# Patient Record
Sex: FEMALE | Race: WHITE | NOT HISPANIC OR LATINO | Employment: FULL TIME | URBAN - METROPOLITAN AREA
[De-identification: names, ages, dates, MRNs, and addresses within clinical notes are randomized per-mention and may not be internally consistent; named-entity substitution may affect disease eponyms.]

---

## 2021-04-08 DIAGNOSIS — Z23 ENCOUNTER FOR IMMUNIZATION: ICD-10-CM

## 2022-02-02 ENCOUNTER — APPOINTMENT (OUTPATIENT)
Dept: RADIOLOGY | Facility: CLINIC | Age: 67
End: 2022-02-02
Payer: COMMERCIAL

## 2022-02-02 ENCOUNTER — OFFICE VISIT (OUTPATIENT)
Dept: OBGYN CLINIC | Facility: CLINIC | Age: 67
End: 2022-02-02
Payer: COMMERCIAL

## 2022-02-02 VITALS
HEART RATE: 76 BPM | WEIGHT: 110 LBS | BODY MASS INDEX: 18.78 KG/M2 | DIASTOLIC BLOOD PRESSURE: 72 MMHG | HEIGHT: 64 IN | SYSTOLIC BLOOD PRESSURE: 126 MMHG

## 2022-02-02 DIAGNOSIS — M25.562 ACUTE PAIN OF LEFT KNEE: ICD-10-CM

## 2022-02-02 DIAGNOSIS — Z01.89 ENCOUNTER FOR LOWER EXTREMITY COMPARISON IMAGING STUDY: ICD-10-CM

## 2022-02-02 DIAGNOSIS — S80.02XA CONTUSION OF LEFT KNEE, INITIAL ENCOUNTER: Primary | ICD-10-CM

## 2022-02-02 PROCEDURE — 73562 X-RAY EXAM OF KNEE 3: CPT

## 2022-02-02 PROCEDURE — 99203 OFFICE O/P NEW LOW 30 MIN: CPT | Performed by: ORTHOPAEDIC SURGERY

## 2022-02-02 PROCEDURE — 73560 X-RAY EXAM OF KNEE 1 OR 2: CPT

## 2022-02-02 RX ORDER — METFORMIN HYDROCHLORIDE 500 MG/1
TABLET, EXTENDED RELEASE ORAL
COMMUNITY
Start: 2022-01-25

## 2022-02-02 RX ORDER — DULAGLUTIDE 1.5 MG/.5ML
INJECTION, SOLUTION SUBCUTANEOUS
COMMUNITY
Start: 2022-01-03

## 2022-02-02 RX ORDER — ATORVASTATIN CALCIUM 20 MG/1
TABLET, FILM COATED ORAL
COMMUNITY
Start: 2022-01-13

## 2022-02-02 RX ORDER — LEVOTHYROXINE SODIUM 100 UG/1
100 TABLET ORAL EVERY MORNING
COMMUNITY
Start: 2021-12-13

## 2022-02-02 RX ORDER — EMPAGLIFLOZIN 25 MG/1
25 TABLET, FILM COATED ORAL DAILY
COMMUNITY
Start: 2022-01-08

## 2022-02-02 NOTE — PROGRESS NOTES
Assessment/Plan:  1  Contusion of left knee, initial encounter     2  Acute pain of left knee  XR knee 3 vw left non injury     Scribe Attestation    I,:  Nano Cage am acting as a scribe while in the presence of the attending physician :       I,:  Peter Mireles, DO personally performed the services described in this documentation    as scribed in my presence :         Awilda Willard is a pleasant 74year old who presents to the office today for an initial evaluation of her left knee  Upon review of the left knee x-ray's, a thorough history and my examination, Awilda Willard is presenting with signs and symptoms consistent with a left knee contusion with age-appropriate degenerative changes  I discussed with the patient that there is no should structural damage but she could of exacerbated the underlying osteoarthritis in her left knee  I discussed with the patient that she may perform her activities as tolerated  She may use Tylenol as needed to help alleviate her pain  I discussed with the patient that if she sees no improvement with her pain in 1 month she should call the office to schedule a follow-up appointment  I discussed with the patient that time, I would likely order formal physical therapy  I discussed with the patient that if her symptoms resolve, I will will follow up with her as needed  She understood and had no further questions  Subjective: Initial evaluation of left knee    Patient ID: Dulce Palacios is a 74 y o  female  who presents to the office today for an initial evaluation of her left knee  She states that on 01/13/2022 she tripped up concrete steps and struck the anterior aspect of her left knee  She states that her pain has been improving since the initial injury  She denies any pain or limitations with ambulating up and down stairs and walking  She states that she will experience pain with kneeling  She states that she will use ice and Tylenol to help alleviate her pain    She denies any previous injuries  She denies any distal paresthesias  Review of Systems   Constitutional: Positive for activity change  Negative for chills, fever and unexpected weight change  HENT: Negative for hearing loss, nosebleeds and sore throat  Eyes: Negative for pain, redness and visual disturbance  Respiratory: Negative for cough, shortness of breath and wheezing  Cardiovascular: Negative for chest pain, palpitations and leg swelling  Gastrointestinal: Negative for abdominal pain, nausea and vomiting  Endocrine: Negative for polyphagia and polyuria  Genitourinary: Negative for dysuria and hematuria  Musculoskeletal: Negative for arthralgias, gait problem, joint swelling and myalgias  Skin: Negative for rash and wound  Neurological: Negative for dizziness, numbness and headaches  Psychiatric/Behavioral: Negative for confusion and suicidal ideas  The patient is not nervous/anxious  Past Medical History:   Diagnosis Date    Diabetes mellitus ([facility] [address] )     Disease of thyroid gland        Past Surgical History:   Procedure Laterality Date     SECTION         Family History   Problem Relation Age of Onset    Cancer Father     Osteoporosis Mother        Social History     Occupational History    Not on file   Tobacco Use    Smoking status: Former Smoker     Packs/day: 0 25     Years: 5 00     Pack years: 1 25     Start date: 1970     Quit date: 1974     Years since quittin 7    Smokeless tobacco: Never Used   Vaping Use    Vaping Use: Never used   Substance and Sexual Activity    Alcohol use:  Yes     Alcohol/week: 2 0 standard drinks     Types: 2 Glasses of wine per week    Drug use: Never    Sexual activity: Not Currently     Partners: Male         Current Outpatient Medications:     atorvastatin (LIPITOR) 20 mg tablet, TAKE 1 TABLET BY MOUTH EVERY DAY AT NIGHT, Disp: , Rfl:     Jardiance 25 MG TABS, Take 25 mg by mouth daily, Disp: , Rfl:     Levoxyl 100 MCG tablet, Take 100 mcg by mouth every morning, Disp: , Rfl:     metFORMIN (GLUCOPHAGE-XR) 500 mg 24 hr tablet, TAKE TWO TABLETS BY MOUTH TWICE A DAY WITH FOOD, Disp: , Rfl:     Trulicity 1 5 JY/9 8ID SOPN, INJECT CONTENTS OF 1 PEN UNDER THE SKIN ONCE A WEEK, Disp: , Rfl:     Allergies   Allergen Reactions    Bacitracin-Neomycin-Polymyxin Rash    Neosporin Plus Max St Itching       Objective:  Vitals:    02/02/22 0755   BP: 126/72   Pulse: 76       Body mass index is 18 88 kg/m²  Left Knee Exam     Tenderness   Left knee tenderness location: Superior patella  Range of Motion   Extension: 0   Flexion: 130     Tests   Varus: negative Valgus: negative  Drawer:  Anterior - negative     Posterior - negative    Other   Erythema: absent  Scars: absent  Sensation: normal  Pulse: present  Swelling: none  Effusion: no effusion present    Comments:  Healing abrasion          Observations   Left Knee   Negative for effusion  Physical Exam  Vitals reviewed  Constitutional:       Appearance: Normal appearance  She is well-developed  HENT:      Head: Normocephalic and atraumatic  Eyes:      General:         Right eye: No discharge  Left eye: No discharge  Extraocular Movements: Extraocular movements intact  Conjunctiva/sclera: Conjunctivae normal    Cardiovascular:      Rate and Rhythm: Normal rate  Pulmonary:      Effort: Pulmonary effort is normal  No respiratory distress  Musculoskeletal:      Cervical back: Normal range of motion and neck supple  Left knee: No effusion  Skin:     General: Skin is warm and dry  Neurological:      General: No focal deficit present  Mental Status: She is alert and oriented to person, place, and time  Psychiatric:         Mood and Affect: Mood normal          Behavior: Behavior normal          I have personally reviewed pertinent films in PACS    X-rays performed in the office today of her left knee demonstrates age-appropriate degenerative changes  There are no acute fractures, dislocations, lytic or blastic lesions

## 2023-06-08 ENCOUNTER — OFFICE VISIT (OUTPATIENT)
Dept: URGENT CARE | Facility: CLINIC | Age: 68
End: 2023-06-08
Payer: COMMERCIAL

## 2023-06-08 VITALS
TEMPERATURE: 97.6 F | RESPIRATION RATE: 16 BRPM | DIASTOLIC BLOOD PRESSURE: 59 MMHG | SYSTOLIC BLOOD PRESSURE: 132 MMHG | HEART RATE: 79 BPM

## 2023-06-08 DIAGNOSIS — R21 RASH AND NONSPECIFIC SKIN ERUPTION: Primary | ICD-10-CM

## 2023-06-08 PROCEDURE — G0383 LEV 4 HOSP TYPE B ED VISIT: HCPCS | Performed by: PHYSICIAN ASSISTANT

## 2023-06-08 RX ORDER — SULFAMETHOXAZOLE AND TRIMETHOPRIM 800; 160 MG/1; MG/1
1 TABLET ORAL EVERY 12 HOURS SCHEDULED
Qty: 10 TABLET | Refills: 0 | Status: SHIPPED | OUTPATIENT
Start: 2023-06-08 | End: 2023-06-13

## 2023-06-08 NOTE — PROGRESS NOTES
[facility]        NAME: Shane Gasca is a 69 y o  female  : 1955    MRN: [id number]  DATE: 2023  TIME: 11:41 AM    Assessment and Plan   Rash and nonspecific skin eruption [R21]  1  Rash and nonspecific skin eruption  sulfamethoxazole-trimethoprim (BACTRIM DS) 800-160 mg per tablet      Pt presents with rash  Distribution and history are not consistent with bed bugs, but bed bug precautions were discussed  She is diabetic and will be started on Bactrim DS as MRSA infection is also part of the differential diagnosis for her  Patient Instructions   Patient Instructions   Continue steroid cream as prescribed  Take Bactrim DS as prescribed  If symptoms are not improved in 3-5 days, follow-up with PCP  If symptoms worsen or you develop any new symptoms, report to the emergency department immediately  Follow up with PCP in 3-5 days  Proceed to  ER if symptoms worsen  Chief Complaint     Chief Complaint   Patient presents with   • Insect Bite     Has bug bites on back of neck  Noticed 4 days ago  Had televisit the next day  Gave clobetasol  Not improving  History of Present Illness       69year old female presents with concerns for bug bites to the back of her neck  She reports she was out of town and stayed with her son and finance in a rented [place] [address]  She noticed the bites as they were coming home  and had a telehealth visit on Monday and was started on Clobetasol which has improved the itch but not the appearance of the bites  She is concerned whether or not they might be bed bug bites  She states her son and fiancee were in a different room, but in the same house and have no bites  Review of Systems   Review of Systems   Skin: Positive for rash           Current Medications       Current Outpatient Medications:   •  atorvastatin (LIPITOR) 20 mg tablet, TAKE 1 TABLET BY MOUTH EVERY DAY AT NIGHT, Disp: , Rfl:   •  Jardiance 25 MG TABS, Take 25 mg by mouth daily, Disp: , Rfl:   •  Levoxyl 100 MCG tablet, Take 100 mcg by mouth every morning, Disp: , Rfl:   •  metFORMIN (GLUCOPHAGE-XR) 500 mg 24 hr tablet, TAKE TWO TABLETS BY MOUTH TWICE A DAY WITH FOOD, Disp: , Rfl:   •  sulfamethoxazole-trimethoprim (BACTRIM DS) 800-160 mg per tablet, Take 1 tablet by mouth every 12 (twelve) hours for 5 days, Disp: 10 tablet, Rfl: 0  •  Trulicity 1 5 VF/4 6NN SOPN, INJECT CONTENTS OF 1 PEN UNDER THE SKIN ONCE A WEEK, Disp: , Rfl:     Current Allergies     Allergies as of 2023 - Reviewed 2023   Allergen Reaction Noted   • Bacitracin-neomycin-polymyxin Rash 2019   • Anatoliy-bacit-poly-lidocaine Itching 2022            The following portions of the patient's history were reviewed and updated as appropriate: allergies, current medications, past family history, past medical history, past social history, past surgical history and problem list      Past Medical History:   Diagnosis Date   • Diabetes mellitus ([facility] [address] )    • Disease of thyroid gland        Past Surgical History:   Procedure Laterality Date   •  SECTION         Family History   Problem Relation Age of Onset   • Cancer Father    • Osteoporosis Mother          Medications have been verified  Objective   /59   Pulse 79   Temp 97 6 °F (36 4 °C) (Temporal)   Resp 16   No LMP recorded  Physical Exam     Physical Exam  Vitals and nursing note reviewed  Constitutional:       General: She is awake  She is not in acute distress  Appearance: Normal appearance  She is well-developed and well-groomed  She is not ill-appearing, toxic-appearing or diaphoretic  HENT:      Head: Normocephalic and atraumatic  Right Ear: Hearing and external ear normal       Left Ear: Hearing and external ear normal    Eyes:      General: Lids are normal  Vision grossly intact  Gaze aligned appropriately     Neck:      Comments: Pt has 5 0 3 cm erythematous lesions with central blister to the "back of the neck with 4 in a linear pattern, there are additional lesions behind both ears and on the left shoulder  Pattern of \"bites\" is not consistent with bed bugs  Cardiovascular:      Rate and Rhythm: Normal rate  Pulmonary:      Effort: Pulmonary effort is normal       Comments: Patient is speaking in full sentences with no increased respiratory effort  No audible wheezing or stridor  Musculoskeletal:      Cervical back: Normal range of motion  Skin:     General: Skin is warm and dry  Neurological:      Mental Status: She is alert and oriented to person, place, and time  Coordination: Coordination is intact  Gait: Gait is intact  Psychiatric:         Attention and Perception: Attention and perception normal          Mood and Affect: Mood and affect normal          Speech: Speech normal          Behavior: Behavior normal  Behavior is cooperative  Note: Portions of this record may have been created with voice recognition software  Occasional wrong word or \"sound a like\" substitutions may have occurred due to the inherent limitations of voice recognition software  Please read the chart carefully and recognize, using context, where substitutions have occurred  *      "

## 2023-06-08 NOTE — PATIENT INSTRUCTIONS
Continue steroid cream as prescribed  Take Bactrim DS as prescribed  If symptoms are not improved in 3-5 days, follow-up with PCP  If symptoms worsen or you develop any new symptoms, report to the emergency department immediately

## 2023-07-17 LAB
CREAT ?TM UR-SCNC: 54 UMOL/L
EXT ALBUMIN URINE RANDOM: 0.2
EXTERNAL EGFR: 97
MICROALBUMIN/CREAT UR: 4 MG/G{CREAT}

## 2023-12-11 LAB
LEFT EYE DIABETIC RETINOPATHY: NORMAL
RIGHT EYE DIABETIC RETINOPATHY: NORMAL
SEVERITY (EYE EXAM): NORMAL

## 2024-01-08 NOTE — PROGRESS NOTES
PT Evaluation     Today's date: 2024  Patient name: Lydia Patel  : 1955  MRN: [id number]  Referring provider: Alyson Maria  Dx:   Encounter Diagnosis     ICD-10-CM    1. Chronic midline low back pain, unspecified whether sciatica present  M54.50     G89.29           Start Time: 1452  Stop Time: 1529  Total time in clinic (min): 37 minutes    Assessment  Assessment details: Lydia Patel is a 68 y.o. female who presents with signs and symptoms consistent of chronic LBP. Patient presents with pain, decreased strength, decreased joint mobility, and postural dysfunction. Due to these impairments, Patient has difficulty performing prolonged sitting, squatting, lifting, and reaching. Patient would benefit from skilled physical therapy to address the impairments, improve their level of function, and to improve their overall quality of life. Reviewed HEP, involved anatomy, physio as well as POC with verbalized understanding and pt is in agreement with above. Pt responded very well on IE with hip and L/S muscle activation acting as a stabilizer to the lumbar spine when moving.  Impairments: abnormal gait, abnormal or restricted ROM, impaired balance, impaired physical strength, lacks appropriate home exercise program, pain with function and poor body mechanics    Goals  Short Term Goals: to be achieved by 4 weeks  1) Patient to be independent with basic HEP  2) Decrease pain to 3/10 at its worst  3) Increase lumbar spine AROM to WFL, pain free  4) Increase LE strength by 1/2 MMT grade in all deficient planes    Long Term Goals: to be achieved by discharge  1) FOTO equal to or greater than projected goal.  2) Patient to be independent with comprehensive HEP  3) Lumbar spine joint mobs WFL  4) Increase LE strength to 5/5 MMT grade in all planes to improve a/iadls  5) Patient to report no sleep interruption secondary to pain  6) Increase tolerance for seated activities to >60 min.     Plan  Patient would  benefit from: skilled physical therapy  Planned modality interventions: cryotherapy and thermotherapy: hydrocollator packs  Planned therapy interventions: abdominal trunk stabilization, ADL training, balance, body mechanics training, home exercise program, functional ROM exercises, flexibility, therapeutic exercise, therapeutic activities, stretching, strengthening, joint mobilization, manual therapy, neuromuscular re-education, patient education, postural training, IASTM, kinesiology taping, massage and Eisenberg taping  Frequency: 2x week  Duration in visits: 16  Duration in weeks: 8  Treatment plan discussed with: patient      Subjective Evaluation    History of Present Illness  Mechanism of injury: History: Pt presents to PT with primary c/o LBP. Pt went to lift 2 heavy planters to take outside to the porch. Pt reports she went to lift it and felt a significant amount of pain. Pt spent the rest of the day going between heat, ice and pain meds. Pt then did some yoga stretches as that is her usual go to. Pt also tried lidocaine patches that didn't help. Pt saw her PCP and told to keep going with stretches. Feels everything has healed with the exception of one spot. When she gets up in the AM, she sits on heat and does her stretches and is better the rest of the day. Pt reports she gets sig hip pain and L sided back pain when first getting up in the morning. 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    Aggravating factors: getting OOB  Relieving factors: see above  Imaging: x-ray  Status: retired  Hobbies/recreation: [employer], walking  Patient Goals  Patient goals for therapy: decreased pain and return to sport/leisure activities    Pain  Current pain ratin  At best pain ratin  At worst pain ratin  Quality: dull ache and sharp  Relieving factors: change in position, heat, ice and medications  Aggravating factors: standing    Social Support  Steps to enter house:  yes  Stairs in house: yes     Employment status: not working    Diagnostic Tests  X-ray: normal  Treatments  Previous treatment: home therapy  Current treatment: physical therapy        Objective     Static Posture     Lumbar Spine   Decreased lordosis.     Palpation   Left   Tenderness of the lumbar paraspinals.     Right   Tenderness of the lumbar paraspinals.     Active Range of Motion     Lumbar   Normal active range of motion  Left Hip   Normal active range of motion    Right Hip   Normal active range of motion    Joint Play   Joints within functional limits: T11, T12, L1, L2 and L3     Hypomobile: L4, L5 and S1   Mechanical Assessment    Cervical      Thoracic      Lumbar    Lying extension: sustained positions  Pain location: centralized  Pain intensity: better  Pain level: decreased    Strength/Myotome Testing     Left Hip   Planes of Motion   Flexion: 4  Extension: 3+  Abduction: 3+    Right Hip   Planes of Motion   Flexion: 4  Extension: 3+  Abduction: 3+    Tests     Lumbar     Left   Negative crossed SLR and slump test.     Right   Negative crossed SLR and slump test.     General Comments:      Lumbar Comments  Pain upon returning to standing from flexion- no pain with OP in all motions  No referral with lumbar PA mobs    MMT without holding on: pain  MMT with holding onto table: no pain reported  *stabilized core reducing pain with MMT       Precautions: DM      Manuals 1/9            L/S PA mobs CPA GII/GIII                                                   Neuro Re-Ed             Anti-rotation             AB             AB c ABD             Steamboats B Abd & ext 15x                                                   Ther Ex             HS S             SL LAQ             SL HS Curl             NuStep                                                                 Ther Activity                                       Gait Training                                       Modalities

## 2024-01-09 ENCOUNTER — EVALUATION (OUTPATIENT)
Dept: PHYSICAL THERAPY | Facility: CLINIC | Age: 69
End: 2024-01-09
Payer: COMMERCIAL

## 2024-01-09 DIAGNOSIS — M54.50 CHRONIC MIDLINE LOW BACK PAIN, UNSPECIFIED WHETHER SCIATICA PRESENT: Primary | ICD-10-CM

## 2024-01-09 DIAGNOSIS — G89.29 CHRONIC MIDLINE LOW BACK PAIN, UNSPECIFIED WHETHER SCIATICA PRESENT: Primary | ICD-10-CM

## 2024-01-09 PROCEDURE — 97140 MANUAL THERAPY 1/> REGIONS: CPT

## 2024-01-09 PROCEDURE — 97161 PT EVAL LOW COMPLEX 20 MIN: CPT

## 2024-01-09 NOTE — LETTER
2024    Alyson Maria  [address] [address]    Patient: Lydia Patel   YOB: 1955   Date of Visit: 2024     Encounter Diagnosis     ICD-10-CM    1. Chronic midline low back pain, unspecified whether sciatica present  M54.50     G89.29           Dear Dr. Maria:    Thank you for your recent referral of Lydia Patel. Please review the attached evaluation summary from Lydia's recent visit.     Please verify that you agree with the plan of care by signing the attached order.     If you have any questions or concerns, please do not hesitate to call.     I sincerely appreciate the opportunity to share in the care of one of your patients and hope to have another opportunity to work with you in the near future.       Sincerely,    Martha Naylor, PT      Referring Provider:      I certify that I have read the below Plan of Care and certify the need for these services furnished under this plan of treatment while under my care.                    Alyson Maria  [address] [place] [id number]  Via Fax: [phone]          PT Evaluation     Today's date: 2024  Patient name: Lydia Patel  : 1955  MRN: [id number]  Referring provider: Alyson Maria  Dx:   Encounter Diagnosis     ICD-10-CM    1. Chronic midline low back pain, unspecified whether sciatica present  M54.50     G89.29           Start Time: 1452  Stop Time: 1529  Total time in clinic (min): 37 minutes    Assessment  Assessment details: Lydia Patel is a 68 y.o. female who presents with signs and symptoms consistent of chronic LBP. Patient presents with pain, decreased strength, decreased joint mobility, and postural dysfunction. Due to these impairments, Patient has difficulty performing prolonged sitting, squatting, lifting, and reaching. Patient would benefit from skilled physical therapy to address the impairments, improve their level of function, and to  improve their overall quality of life. Reviewed HEP, involved anatomy, physio as well as POC with verbalized understanding and pt is in agreement with above. Pt responded very well on IE with hip and L/S muscle activation acting as a stabilizer to the lumbar spine when moving.  Impairments: abnormal gait, abnormal or restricted ROM, impaired balance, impaired physical strength, lacks appropriate home exercise program, pain with function and poor body mechanics    Goals  Short Term Goals: to be achieved by 4 weeks  1) Patient to be independent with basic HEP  2) Decrease pain to 3/10 at its worst  3) Increase lumbar spine AROM to WFL, pain free  4) Increase LE strength by 1/2 MMT grade in all deficient planes    Long Term Goals: to be achieved by discharge  1) FOTO equal to or greater than projected goal.  2) Patient to be independent with comprehensive HEP  3) Lumbar spine joint mobs WFL  4) Increase LE strength to 5/5 MMT grade in all planes to improve a/iadls  5) Patient to report no sleep interruption secondary to pain  6) Increase tolerance for seated activities to >60 min.     Plan  Patient would benefit from: skilled physical therapy  Planned modality interventions: cryotherapy and thermotherapy: hydrocollator packs  Planned therapy interventions: abdominal trunk stabilization, ADL training, balance, body mechanics training, home exercise program, functional ROM exercises, flexibility, therapeutic exercise, therapeutic activities, stretching, strengthening, joint mobilization, manual therapy, neuromuscular re-education, patient education, postural training, IASTM, kinesiology taping, massage and Eisenberg taping  Frequency: 2x week  Duration in visits: 16  Duration in weeks: 8  Treatment plan discussed with: patient      Subjective Evaluation    History of Present Illness  Mechanism of injury: History: Pt presents to PT with primary c/o LBP. Pt went to lift 2 heavy planters to take outside to the porch. Pt  reports she went to lift it and felt a significant amount of pain. Pt spent the rest of the day going between heat, ice and pain meds. Pt then did some yoga stretches as that is her usual go to. Pt also tried lidocaine patches that didn't help. Pt saw her PCP and told to keep going with stretches. Feels everything has healed with the exception of one spot. When she gets up in the AM, she sits on heat and does her stretches and is better the rest of the day. Pt reports she gets sig hip pain and L sided back pain when first getting up in the morning. 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    Aggravating factors: getting OOB  Relieving factors: see above  Imaging: x-ray  Status: retired  Hobbies/recreation: [employer], walking  Patient Goals  Patient goals for therapy: decreased pain and return to sport/leisure activities    Pain  Current pain ratin  At best pain ratin  At worst pain ratin  Quality: dull ache and sharp  Relieving factors: change in position, heat, ice and medications  Aggravating factors: standing    Social Support  Steps to enter house: yes  Stairs in house: yes     Employment status: not working    Diagnostic Tests  X-ray: normal  Treatments  Previous treatment: home therapy  Current treatment: physical therapy        Objective     Static Posture     Lumbar Spine   Decreased lordosis.     Palpation   Left   Tenderness of the lumbar paraspinals.     Right   Tenderness of the lumbar paraspinals.     Active Range of Motion     Lumbar   Normal active range of motion  Left Hip   Normal active range of motion    Right Hip   Normal active range of motion    Joint Play   Joints within functional limits: T11, T12, L1, L2 and L3     Hypomobile: L4, L5 and S1   Mechanical Assessment    Cervical      Thoracic      Lumbar    Lying extension: sustained positions  Pain location: centralized  Pain intensity: better  Pain level: decreased    Strength/Myotome  Testing     Left Hip   Planes of Motion   Flexion: 4  Extension: 3+  Abduction: 3+    Right Hip   Planes of Motion   Flexion: 4  Extension: 3+  Abduction: 3+    Tests     Lumbar     Left   Negative crossed SLR and slump test.     Right   Negative crossed SLR and slump test.     General Comments:      Lumbar Comments  Pain upon returning to standing from flexion- no pain with OP in all motions  No referral with lumbar PA mobs    MMT without holding on: pain  MMT with holding onto table: no pain reported  *stabilized core reducing pain with MMT       Precautions: DM      Manuals 1/9            L/S PA mobs CPA GII/GIII                                                   Neuro Re-Ed             Anti-rotation             AB             AB c ABD             Steamboats B Abd & ext 15x                                                   Ther Ex             HS S             SL LAQ             SL HS Curl             NuStep                                                                 Ther Activity                                       Gait Training                                       Modalities

## 2024-01-10 ENCOUNTER — OFFICE VISIT (OUTPATIENT)
Dept: PHYSICAL THERAPY | Facility: CLINIC | Age: 69
End: 2024-01-10
Payer: COMMERCIAL

## 2024-01-10 DIAGNOSIS — G89.29 CHRONIC MIDLINE LOW BACK PAIN, UNSPECIFIED WHETHER SCIATICA PRESENT: Primary | ICD-10-CM

## 2024-01-10 DIAGNOSIS — M54.50 CHRONIC MIDLINE LOW BACK PAIN, UNSPECIFIED WHETHER SCIATICA PRESENT: Primary | ICD-10-CM

## 2024-01-10 PROCEDURE — 97140 MANUAL THERAPY 1/> REGIONS: CPT

## 2024-01-10 PROCEDURE — 97110 THERAPEUTIC EXERCISES: CPT

## 2024-01-10 PROCEDURE — 97112 NEUROMUSCULAR REEDUCATION: CPT

## 2024-01-10 NOTE — PROGRESS NOTES
"Daily Note     Today's date: 1/10/2024  Patient name: Lydia Patel  : 1955  MRN: [id number]  Referring provider: Alyson Maria  Dx:   Encounter Diagnosis     ICD-10-CM    1. Chronic midline low back pain, unspecified whether sciatica present  M54.50     G89.29           Start Time: 928  Stop Time: 1016  Total time in clinic (min): 48 minutes    Subjective: Pt reports this AM she had some more discomfort in the one certain spot in her back and that it has been higher in the last few days.       Objective: See treatment diary below      Assessment: Tolerated treatment well. Patient demonstrated fatigue post treatment, exhibited good technique with therapeutic exercises, and would benefit from continued PT. Added open books for HEP and pt reported an overall decrease in LBP at conclusion of the session. Pt did have more hip discomfort upon arrival that dissipated by conclusion of session.      Plan: Continue per plan of care.  Progress treatment as tolerated.       Precautions: DM      Manuals 1/9 1/10           L/S PA mobs CPA GII/GIII CPA GII/GIII                                                  Neuro Re-Ed             Anti-rotation             AB  5\"x10           AB c ADD  5\"x10           Steamboats B Abd & ext 15x                                                   Ther Ex             HS S              LAQ  11# 2x10            HS Curl  11# 2x10           NuStep  L3 7'           bridges  3\"x10           Open books  5\"x10 B                                     Ther Activity                                       Gait Training                                       Modalities                                            "

## 2024-01-15 ENCOUNTER — OFFICE VISIT (OUTPATIENT)
Dept: PHYSICAL THERAPY | Facility: CLINIC | Age: 69
End: 2024-01-15
Payer: COMMERCIAL

## 2024-01-15 DIAGNOSIS — G89.29 CHRONIC MIDLINE LOW BACK PAIN, UNSPECIFIED WHETHER SCIATICA PRESENT: Primary | ICD-10-CM

## 2024-01-15 DIAGNOSIS — M54.50 CHRONIC MIDLINE LOW BACK PAIN, UNSPECIFIED WHETHER SCIATICA PRESENT: Primary | ICD-10-CM

## 2024-01-15 PROCEDURE — 97112 NEUROMUSCULAR REEDUCATION: CPT

## 2024-01-15 PROCEDURE — 97110 THERAPEUTIC EXERCISES: CPT

## 2024-01-15 NOTE — PROGRESS NOTES
"Daily Note     Today's date: 1/15/2024  Patient name: Lydia Patel  : 1955  MRN: [id number]  Referring provider: Alyson Maria  Dx:   Encounter Diagnosis     ICD-10-CM    1. Chronic midline low back pain, unspecified whether sciatica present  M54.50     G89.29                      Subjective: Pt reports she had an incident of diarrhea one morning over the weekend and had one morning of that 3 weeks ago however has not had it any more. Pt inquired if that could be related to LBP.      Objective: See treatment diary below      Assessment: Tolerated treatment well. Patient demonstrated fatigue post treatment, exhibited good technique with therapeutic exercises, and would benefit from continued PT. Educated pt to reach out to the dr as the inconsistency could be from food, medication or something else however getting a Drs opinion is best. Pt stated she would prefer to wait and not speak to the Dr as she does not like to call them and would like pt push off any issues until after her son gets . Advised pt I would suggest the opposite and to reach out however that it is ultimately her decision.      Plan: Continue per plan of care.  Progress treatment as tolerated.       Precautions: DM    1:1 [id number]  Manuals 1/9 1/10 1/15          L/S PA mobs CPA GII/GIII CPA GII/GIII CPA GII/GIII                                                 Neuro Re-Ed             Anti-rotation             AB  5\"x10 5\"x10          AB c ADD  5\"x10           Steamboats B Abd & ext 15x            Resisted LTR   3\"x15                                    Ther Ex             HS S              LAQ  11# 2x10 11# 20x           HS Curl  11# 2x10 11# 20x          NuStep  L3 7' L3 6'          bridges  3\"x10 3\"x15          Open books  5\"x10 B 5\"x10 B                                    Ther Activity                                       Gait Training                                       Modalities                                     "

## 2024-01-16 ENCOUNTER — APPOINTMENT (OUTPATIENT)
Dept: PHYSICAL THERAPY | Facility: CLINIC | Age: 69
End: 2024-01-16
Payer: COMMERCIAL

## 2024-01-18 ENCOUNTER — OFFICE VISIT (OUTPATIENT)
Dept: PHYSICAL THERAPY | Facility: CLINIC | Age: 69
End: 2024-01-18
Payer: COMMERCIAL

## 2024-01-18 DIAGNOSIS — M54.50 CHRONIC MIDLINE LOW BACK PAIN, UNSPECIFIED WHETHER SCIATICA PRESENT: Primary | ICD-10-CM

## 2024-01-18 DIAGNOSIS — G89.29 CHRONIC MIDLINE LOW BACK PAIN, UNSPECIFIED WHETHER SCIATICA PRESENT: Primary | ICD-10-CM

## 2024-01-18 PROCEDURE — 97112 NEUROMUSCULAR REEDUCATION: CPT

## 2024-01-18 PROCEDURE — 97140 MANUAL THERAPY 1/> REGIONS: CPT

## 2024-01-18 PROCEDURE — 97110 THERAPEUTIC EXERCISES: CPT

## 2024-01-18 NOTE — PROGRESS NOTES
"Daily Note     Today's date: 2024  Patient name: Lydia Patel  : 1955  MRN: [id number]  Referring provider: Alyson Maria  Dx:   Encounter Diagnosis     ICD-10-CM    1. Chronic midline low back pain, unspecified whether sciatica present  M54.50     G89.29           Start Time: 0900  Stop Time: 0945  Total time in clinic (min): 45 minutes    Subjective: Pt presents to PT stating her stomach does feel better however that she abruptly went from 3x/day of 2 tylenol to taking no tylenol. Pt reports she woke up with an increase in pain. Pt also states today she has been taking 6 tylenol a day since Thanksgiving per Drs advisory and hasn't stopped because she hasn't followed up with the Dr and that they gave her no time period on ending/cutting down.      Objective: See treatment diary below      Assessment: Tolerated treatment fair. Patient demonstrated fatigue post treatment, exhibited good technique with therapeutic exercises, and would benefit from continued PT. Advised pt she should reach out to the Dr about changing the amount of tylenol she is taking and that her change in pain levels is likely due to the significant, abrupt change in pain meds she is now not taking. Pt also requested updated print outs to use machines at the gym.      Plan: Continue per plan of care.  Progress treatment as tolerated.       Precautions: DM    1:1 [id number]  Manuals 1/9 1/10 1/15 1/18         L/S PA mobs CPA GII/GIII CPA GII/GIII CPA GII/GIII CPA GIII         L/S STM    RE                                   Neuro Re-Ed             Anti-rotation             AB  5\"x10 5\"x10 5\"x15         AB c ADD  5\"x10           Steamboats B Abd & ext 15x            Resisted LTR   3\"x15 3\"x20                                   Ther Ex             HS S              LAQ  11# 2x10 11# 20x 11# 20x          HS Curl  11# 2x10 11# 20x 11# 20x         NuStep  L3 7' L3 6' L3 8'         bridges  3\"x10 3\"x15 3\"x15         Open books  5\"x10 B " "5\"x10 B 5\"x10 B         Leg press    50# 20x                      Ther Activity                                       Gait Training                                       Modalities                                                "

## 2024-01-23 ENCOUNTER — OFFICE VISIT (OUTPATIENT)
Dept: PHYSICAL THERAPY | Facility: CLINIC | Age: 69
End: 2024-01-23
Payer: COMMERCIAL

## 2024-01-23 DIAGNOSIS — G89.29 CHRONIC MIDLINE LOW BACK PAIN, UNSPECIFIED WHETHER SCIATICA PRESENT: Primary | ICD-10-CM

## 2024-01-23 DIAGNOSIS — M54.50 CHRONIC MIDLINE LOW BACK PAIN, UNSPECIFIED WHETHER SCIATICA PRESENT: Primary | ICD-10-CM

## 2024-01-23 PROCEDURE — 97110 THERAPEUTIC EXERCISES: CPT

## 2024-01-23 PROCEDURE — 97140 MANUAL THERAPY 1/> REGIONS: CPT

## 2024-01-23 NOTE — PROGRESS NOTES
"Daily Note     Today's date: 2024  Patient name: Lydia Patel  : 1955  MRN: [id number]  Referring provider: Alyson Maria  Dx:   Encounter Diagnosis     ICD-10-CM    1. Chronic midline low back pain, unspecified whether sciatica present  M54.50     G89.29           Start Time: 1026  Stop Time: 1104  Total time in clinic (min): 38 minutes    Subjective: Pt reports that her back continues to bother her and she has been pulling her knee to her chest and its not helping. Pt did cut down on pain meds from 6/day to 4/day.      Objective: See treatment diary below      Assessment: Tolerated treatment well. Patient demonstrated fatigue post treatment, exhibited good technique with therapeutic exercises, and would benefit from continued PT. Pt responded well to extension preference. Reviewed updated HEP with pt to do standing or prone L/S extensions.      Plan: Continue per plan of care.  Progress treatment as tolerated.       Precautions: DM      Manuals 1/9 1/10 1/15 1/18 1/23        L/S PA mobs CPA GII/GIII CPA GII/GIII CPA GII/GIII CPA GIII CPA GIII        L/S STM    RE                                   Neuro Re-Ed             Anti-rotation             AB  5\"x10 5\"x10 5\"x15         AB c ADD  5\"x10           Steamboats B Abd & ext 15x            Resisted LTR   3\"x15 3\"x20                                   Ther Ex             L/S ext     20x         LAQ  11# 2x10 11# 20x 11# 20x 11# 20x         HS Curl  11# 2x10 11# 20x 11# 20x 11# 20x        NuStep  L3 7' L3 6' L3 8' L3 7'        bridges  3\"x10 3\"x15 3\"x15         Open books  5\"x10 B 5\"x10 B 5\"x10 B         Leg press    50# 20x 50# 20x        PPU     2x10        Ther Activity                                       Gait Training                                       Modalities                                                  "

## 2024-01-25 ENCOUNTER — OFFICE VISIT (OUTPATIENT)
Dept: PHYSICAL THERAPY | Facility: CLINIC | Age: 69
End: 2024-01-25
Payer: COMMERCIAL

## 2024-01-25 DIAGNOSIS — G89.29 CHRONIC MIDLINE LOW BACK PAIN, UNSPECIFIED WHETHER SCIATICA PRESENT: Primary | ICD-10-CM

## 2024-01-25 DIAGNOSIS — M54.50 CHRONIC MIDLINE LOW BACK PAIN, UNSPECIFIED WHETHER SCIATICA PRESENT: Primary | ICD-10-CM

## 2024-01-25 PROCEDURE — 97140 MANUAL THERAPY 1/> REGIONS: CPT

## 2024-01-25 PROCEDURE — 97110 THERAPEUTIC EXERCISES: CPT

## 2024-01-25 PROCEDURE — 97112 NEUROMUSCULAR REEDUCATION: CPT

## 2024-01-25 NOTE — PROGRESS NOTES
"Daily Note     Today's date: 2024  Patient name: Lydia Patel  : 1955  MRN: [id number]  Referring provider: Alyson Maria  Dx:   Encounter Diagnosis     ICD-10-CM    1. Chronic midline low back pain, unspecified whether sciatica present  M54.50     G89.29           Start Time: 09  Stop Time: 1003  Total time in clinic (min): 38 minutes    Subjective: Pt arrives inquiring about the PPU exercise because she thinks she is doing it wrong.      Objective: See treatment diary below      Assessment: Tolerated treatment well. Patient demonstrated fatigue post treatment, exhibited good technique with therapeutic exercises, and would benefit from continued PT. Pt notes that with manual PA's she is no longer getting tenderness vs when she started she states she had a few areas of tenderness during manuals. Reviewed PPU's with pt with good review and verbalized understanding on how to properly perform.      Plan: Continue per plan of care.  Progress treatment as tolerated.       Precautions: DM      Manuals 1/9 1/10 1/15 1/18 1/23 1/25       L/S PA mobs CPA GII/GIII CPA GII/GIII CPA GII/GIII CPA GIII CPA GIII CPA GIII       L/S STM    RE                                   Neuro Re-Ed             Anti-rotation             AB  5\"x10 5\"x10 5\"x15  5\"x15       AB c ADD  5\"x10           Steamboats B Abd & ext 15x            Resisted LTR   3\"x15 3\"x20  3\"X20                                 Ther Ex             L/S ext     20x         LAQ  11# 2x10 11# 20x 11# 20x 11# 20x 11#        HS Curl  11# 2x10 11# 20x 11# 20x 11# 20x 11#       NuStep  L3 7' L3 6' L3 8' L3 7' L4 8'       bridges  3\"x10 3\"x15 3\"x15  3\"x20       Open books  5\"x10 B 5\"x10 B 5\"x10 B         Leg press    50# 20x 50# 20x 50# 20x       PPU     2x10 2x10       Ther Activity                                       Gait Training                                       Modalities                                                    "

## 2024-01-30 ENCOUNTER — OFFICE VISIT (OUTPATIENT)
Dept: PHYSICAL THERAPY | Facility: CLINIC | Age: 69
End: 2024-01-30
Payer: COMMERCIAL

## 2024-01-30 DIAGNOSIS — M54.50 CHRONIC MIDLINE LOW BACK PAIN, UNSPECIFIED WHETHER SCIATICA PRESENT: Primary | ICD-10-CM

## 2024-01-30 DIAGNOSIS — G89.29 CHRONIC MIDLINE LOW BACK PAIN, UNSPECIFIED WHETHER SCIATICA PRESENT: Primary | ICD-10-CM

## 2024-01-30 PROCEDURE — 97110 THERAPEUTIC EXERCISES: CPT

## 2024-01-30 PROCEDURE — 97112 NEUROMUSCULAR REEDUCATION: CPT

## 2024-01-30 NOTE — PROGRESS NOTES
"Daily Note     Today's date: 2024  Patient name: Lydia Patel  : 1955  MRN: [id number]  Referring provider: Alyson Maria  Dx:   Encounter Diagnosis     ICD-10-CM    1. Chronic midline low back pain, unspecified whether sciatica present  M54.50     G89.29           Start Time: 1033  Stop Time: 1111  Total time in clinic (min): 38 minutes    Subjective: Pt reports she has been feeling good after doing HEP regularly and keeping up with postural corrections. Pt notes she has not been doing PPU and only been working on standing lumbar extensions secondary to not always liking the PPU.      Objective: See treatment diary below      Assessment: Tolerated treatment well. Patient demonstrated fatigue post treatment, exhibited good technique with therapeutic exercises, and would benefit from continued PT. Progressed pts strength exercises by reducing LAQ to SL on the machines with good tolerance. Reviewed with pt she may be sore in the next day or two due to progressions. Also reviewed PPU and standing extensions with pt in order for her to keep up and continue with extension preference as that is reducing her overall discomfort.      Plan: Continue per plan of care.  Progress treatment as tolerated.       Precautions: DM      Manuals 1/9 1/10 1/15 1/18 1/23 1/25 1/30      L/S PA mobs CPA GII/GIII CPA GII/GIII CPA GII/GIII CPA GIII CPA GIII CPA GIII       L/S STM    RE                                   Neuro Re-Ed             Anti-rotation       6# 5\"x10      AB  5\"x10 5\"x10 5\"x15  5\"x15       AB c ADD  5\"x10     5\"x20      Steamboats B Abd & ext 15x            Resisted LTR   3\"x15 3\"x20  3\"X20 3\"x20                                Ther Ex             L/S ext     20x  2x10       LAQ  11# 2x10 11# 20x 11# 20x 11# 20x 11# 20x SL 11# 2x10 B       HS Curl  11# 2x10 11# 20x 11# 20x 11# 20x 11# 20x SL 11# 20x      NuStep  L3 7' L3 6' L3 8' L3 7' L4 8' L4 8'      bridges  3\"x10 3\"x15 3\"x15  3\"x20 3\"X20      Open " "books  5\"x10 B 5\"x10 B 5\"x10 B         Leg press    50# 20x 50# 20x 50# 20x 50# 20x      PPU     2x10 2x10 15x      Ther Activity                                       Gait Training                                       Modalities                                                      "

## 2024-01-31 ENCOUNTER — OFFICE VISIT (OUTPATIENT)
Dept: FAMILY MEDICINE CLINIC | Facility: CLINIC | Age: 69
End: 2024-01-31
Payer: COMMERCIAL

## 2024-01-31 ENCOUNTER — TELEPHONE (OUTPATIENT)
Dept: ADMINISTRATIVE | Facility: OTHER | Age: 69
End: 2024-01-31

## 2024-01-31 VITALS
OXYGEN SATURATION: 97 % | HEART RATE: 75 BPM | WEIGHT: 108 LBS | SYSTOLIC BLOOD PRESSURE: 122 MMHG | HEIGHT: 64 IN | BODY MASS INDEX: 18.44 KG/M2 | DIASTOLIC BLOOD PRESSURE: 78 MMHG | TEMPERATURE: 97.2 F

## 2024-01-31 DIAGNOSIS — E13.9 LADA (LATENT AUTOIMMUNE DIABETES OF ADULTHOOD) (HCC): ICD-10-CM

## 2024-01-31 DIAGNOSIS — M54.50 LOW BACK PAIN RADIATING TO LEFT LOWER EXTREMITY: Primary | ICD-10-CM

## 2024-01-31 DIAGNOSIS — E78.2 MIXED HYPERLIPIDEMIA: ICD-10-CM

## 2024-01-31 DIAGNOSIS — Z76.89 ENCOUNTER TO ESTABLISH CARE: ICD-10-CM

## 2024-01-31 DIAGNOSIS — M81.0 AGE-RELATED OSTEOPOROSIS WITHOUT CURRENT PATHOLOGICAL FRACTURE: ICD-10-CM

## 2024-01-31 DIAGNOSIS — E03.8 OTHER SPECIFIED HYPOTHYROIDISM: ICD-10-CM

## 2024-01-31 DIAGNOSIS — E55.9 VITAMIN D DEFICIENCY: ICD-10-CM

## 2024-01-31 DIAGNOSIS — M79.605 LOW BACK PAIN RADIATING TO LEFT LOWER EXTREMITY: Primary | ICD-10-CM

## 2024-01-31 PROBLEM — M81.8 IDIOPATHIC OSTEOPOROSIS: Status: ACTIVE | Noted: 2018-12-05

## 2024-01-31 PROBLEM — E11.9 DIABETES MELLITUS WITHOUT COMPLICATION (HCC): Status: ACTIVE | Noted: 2018-12-05

## 2024-01-31 PROCEDURE — 1159F MED LIST DOCD IN RCRD: CPT | Performed by: NURSE PRACTITIONER

## 2024-01-31 PROCEDURE — 3725F SCREEN DEPRESSION PERFORMED: CPT | Performed by: NURSE PRACTITIONER

## 2024-01-31 PROCEDURE — 1101F PT FALLS ASSESS-DOCD LE1/YR: CPT | Performed by: NURSE PRACTITIONER

## 2024-01-31 PROCEDURE — 3288F FALL RISK ASSESSMENT DOCD: CPT | Performed by: NURSE PRACTITIONER

## 2024-01-31 PROCEDURE — 99203 OFFICE O/P NEW LOW 30 MIN: CPT | Performed by: NURSE PRACTITIONER

## 2024-01-31 PROCEDURE — 1160F RVW MEDS BY RX/DR IN RCRD: CPT | Performed by: NURSE PRACTITIONER

## 2024-01-31 RX ORDER — CALCIUM CARBONATE/VITAMIN D3 500 MG-10
1 TABLET,CHEWABLE ORAL DAILY
COMMUNITY

## 2024-01-31 RX ORDER — METHYLPREDNISOLONE 4 MG/1
TABLET ORAL
Qty: 21 EACH | Refills: 0 | Status: SHIPPED | OUTPATIENT
Start: 2024-01-31 | End: 2024-02-08 | Stop reason: ALTCHOICE

## 2024-01-31 RX ORDER — CYCLOBENZAPRINE HCL 5 MG
5 TABLET ORAL
Qty: 15 TABLET | Refills: 0 | Status: SHIPPED | OUTPATIENT
Start: 2024-01-31

## 2024-01-31 RX ORDER — BLOOD-GLUCOSE SENSOR
EACH MISCELLANEOUS
COMMUNITY
Start: 2023-12-11

## 2024-01-31 RX ORDER — RISEDRONATE SODIUM 35 MG/1
TABLET, DELAYED RELEASE ORAL
COMMUNITY
Start: 2024-01-29

## 2024-01-31 NOTE — TELEPHONE ENCOUNTER
----- Message from Consuelo Luna sent at 1/31/2024  9:14 AM EST -----  Regarding: Health Maintenance  01/31/24 9:17 AM    Hello, our patient Lydia Patel has had Hemoglobin A1c completed/performed. Please assist in updating the patient chart by pulling the Care Everywhere (CE) document. The date of service is 12/2023.       Sowmya, our patient Lydia Patel has had Urine Albumin/Creatinine Ratio completed/performed. Please assist in updating the patient chart by pulling the Care Everywhere (CE) document. The date of service is 07/2023.       Thank you,  Consuelo Luna  [facility]

## 2024-01-31 NOTE — PROGRESS NOTES
Assessment/Plan:     Diagnoses and all orders for this visit:    Low back pain radiating to left lower extremity  -     cyclobenzaprine (FLEXERIL) 5 mg tablet; Take 1 tablet (5 mg total) by mouth daily at bedtime  -     methylPREDNISolone 4 MG tablet therapy pack; Use as directed on package    Encounter to establish care    JOSE (latent autoimmune diabetes of adulthood) ([facility])    Mixed hyperlipidemia    Vitamin D deficiency    Age-related osteoporosis without current pathological fracture    Other specified hypothyroidism    Other orders  -     Risedronate Sodium 35 MG TBEC  -     Continuous Blood Gluc Sensor (FreeStyle Roxanna 3 Sensor) MISC; USE AS DIRECTED EVERY 14 DAYS  -     Calcium Carb-Cholecalciferol 500-10 MG-MCG CHEW; Chew 1 tablet daily        #1 Low back pain radiating to left lower extremity  Discussed with patient plan to treat with a muscle relaxer (cyclobenzaprine 5 mg) at bedtime and a Medrol dose pack to reduce inflammation  #2 Encounter to establish care  Patient moved to [facility] two years ago and not have a primary care provider that she was seeing back in [place].  Patient recently retired and started on Medicare but thinks that her Medicare annual wellness questions were done by her  so will check into seeing if she needs a Welcome to Medicare visit  #3 Latent autoimmune diabetes of adulthood ([facility])  Patient is being followed by endocrinology in [place] ([facility]) that she sees every 6 months  Patient's last hemoglobin A1c was 7.4 on 12/18/2023, microalbumin performed on 07/17/2023 and GFR was 99 on 12/18/2023  She reports that she goes annually for diabetic eye exams and provider requested report to be provider for last exam or next exam  She reports that her endocrinologist does annual diabetic foot exams  Patient is currently taking Trulicity 1.5 mg weekly, metformin 500 mg twice a day and Jardiance 25 mg daily which are all prescribed  by endocrinologist  #3 Mixed hyperlipidemia  Patient currently on atorvastatin 20 mg that is being managed and ordered through her endocrinologist office  #4 Vitamin D Deficiency  Patient is being monitored through her endocrinologist office last lab was performed on 06/23/2021 and result was 58 (within normal limits)  #5 Age-related osteoporosis without current pathological fracture  Patient is currently on risedronate 35 mg  and is being ordered by her gynecologist office  Patient's last bone density test was performed on 05/09/2023  Patient reports that her gynecologist also orders her annual mammogram and is due to schedule one for this year.  #6 Other specified hypothyroidism  Patient is currently taking Levoxyl 100 mcg daily and being managed through her endocrinologist office - her last TSH was 3.18 performed on 07/17/2023  Patient instructed to return for needs Welcome to Medicare visit or next year for subsequent wellness visit or sooner if needed  Patient encouraged to call the office for problems or concerns in the interim    Subjective:      Patient ID: Lydia Patel is a 68 y.o. female.    68 y.o.female presenting to [facility] care with left lower back pain that radiates into left upper leg. Patient reports that around Christmas she was moving a planter from her porch to inside the apartment and left a popping sensation. She had her previous PCP order physical therapy and a lumbar xray.  The x-ray was performed on 01/02/2024 and exhibited straightening of the lumbar lordosis, degenerative changes most significant at L5/S1 level, no compression fractures and mild degenerative changes hip joints and sacroiliac joints. She has been attending physical therapy twice a wek and the right side back pain improved but she still waking up in the morning with pain on left side. She states that once she is up and moving around the back pain improves for the rest of the day. She reports that she is followed by her  "endocrinologist for diabetes mellitus and hypothyroidism. Her gynecologist manages her osteoporosis and orders annual mammograms. She goes for annual diabetic eye exams and endocrinologist does annual foot exam. She reports that she has had a colonoscopy in the past but unsure of the date and findings.      The following portions of the patient's history were reviewed and updated as appropriate: allergies, current medications, past family history, past medical history, past social history, past surgical history, and problem list.    Review of Systems   Constitutional: Negative.    Respiratory: Negative.     Cardiovascular: Negative.    Gastrointestinal: Negative.    Genitourinary: Negative.    Musculoskeletal:  Positive for back pain and myalgias. Negative for gait problem.   Neurological:  Negative for weakness and numbness.   Psychiatric/Behavioral: Negative.         Objective:      /78 (BP Location: Left arm, Patient Position: Sitting, Cuff Size: Standard)   Pulse 75   Temp (!) 97.2 °F (36.2 °C)   Ht 5' 4\" (1.626 m)   Wt 49 kg (108 lb)   SpO2 97%   BMI 18.54 kg/m² (Reviewed)       Physical Exam  Vitals reviewed.   Constitutional:       General: She is not in acute distress.     Appearance: She is well-developed and well-groomed. She is not ill-appearing.   HENT:      Head: Normocephalic and atraumatic.   Eyes:      General: Lids are normal.      Extraocular Movements: Extraocular movements intact.      Conjunctiva/sclera: Conjunctivae normal.      Pupils: Pupils are equal, round, and reactive to light.   Neck:      Trachea: Trachea and phonation normal.   Cardiovascular:      Rate and Rhythm: Normal rate and regular rhythm.      Pulses: no weak pulses          Dorsalis pedis pulses are 2+ on the right side and 2+ on the left side.        Posterior tibial pulses are 2+ on the right side and 2+ on the left side.      Heart sounds: Normal heart sounds.   Pulmonary:      Effort: Pulmonary effort is " normal.      Breath sounds: Normal breath sounds.   Abdominal:      General: Abdomen is flat. Bowel sounds are normal. There is no distension.      Palpations: Abdomen is soft.      Tenderness: There is no abdominal tenderness.   Musculoskeletal:         General: Tenderness present.      Lumbar back: Spasms and tenderness present. No swelling or bony tenderness. Negative right straight leg raise test and negative left straight leg raise test.   Feet:      Right foot:      Skin integrity: No ulcer, skin breakdown, erythema, warmth, callus or dry skin.      Left foot:      Skin integrity: No ulcer, skin breakdown, erythema, warmth, callus or dry skin.   Skin:     General: Skin is warm and dry.      Capillary Refill: Capillary refill takes less than 2 seconds.   Neurological:      Mental Status: She is alert and oriented to person, place, and time.   Psychiatric:         Mood and Affect: Mood normal.         Behavior: Behavior normal. Behavior is cooperative.         Patient's shoes and socks removed.    Right Foot/Ankle   Right Foot Inspection  Skin Exam: skin normal and skin intact. No dry skin, no warmth, no callus, no erythema, no maceration, no abnormal color, no pre-ulcer, no ulcer and no callus.     Toe Exam: ROM and strength within normal limits.     Sensory   Vibration: intact  Monofilament testing: intact    Vascular  Capillary refills: < 3 seconds  The right DP pulse is 2+. The right PT pulse is 2+.     Left Foot/Ankle  Left Foot Inspection  Skin Exam: skin normal and skin intact. No dry skin, no warmth, no erythema, no maceration, normal color, no pre-ulcer, no ulcer and no callus.     Toe Exam: ROM and strength within normal limits.     Sensory   Vibration: intact  Monofilament testing: intact    Vascular  Capillary refills: < 3 seconds  The left DP pulse is 2+. The left PT pulse is 2+.     Assign Risk Category  No deformity present  No loss of protective sensation  No weak pulses  Risk: 0

## 2024-02-01 ENCOUNTER — OFFICE VISIT (OUTPATIENT)
Dept: PHYSICAL THERAPY | Facility: CLINIC | Age: 69
End: 2024-02-01
Payer: COMMERCIAL

## 2024-02-01 DIAGNOSIS — M54.50 CHRONIC MIDLINE LOW BACK PAIN, UNSPECIFIED WHETHER SCIATICA PRESENT: Primary | ICD-10-CM

## 2024-02-01 DIAGNOSIS — G89.29 CHRONIC MIDLINE LOW BACK PAIN, UNSPECIFIED WHETHER SCIATICA PRESENT: Primary | ICD-10-CM

## 2024-02-01 LAB — HBA1C MFR BLD HPLC: 8.3 %

## 2024-02-01 PROCEDURE — 97112 NEUROMUSCULAR REEDUCATION: CPT

## 2024-02-01 PROCEDURE — 97140 MANUAL THERAPY 1/> REGIONS: CPT

## 2024-02-01 PROCEDURE — 97110 THERAPEUTIC EXERCISES: CPT

## 2024-02-01 NOTE — TELEPHONE ENCOUNTER
Upon review of the In Basket request we were able to locate, review, and update the patient chart as requested for Hemoglobin A1c.    Any additional questions or concerns should be emailed to the Practice Liaisons via the appropriate education email address, please do not reply via In Basket.    Thank you  Nancy Bradshaw MA       No Urine Ratio result

## 2024-02-01 NOTE — PROGRESS NOTES
"Daily Note     Today's date: 2024  Patient name: Lydia Patel  : 1955  MRN: [id number]  Referring provider: Alyson Maria  Dx:   Encounter Diagnosis     ICD-10-CM    1. Chronic midline low back pain, unspecified whether sciatica present  M54.50     G89.29           Start Time: 1440  Stop Time: 1536  Total time in clinic (min): 56 minutes    Subjective: Pt reports she established care at [facility] [place] the [place] yesterday and she will now be placed on a steroid pack and muscle relaxer. Should she see no improvement, she will be sent for an MRI.      Objective: See treatment diary below      Assessment: Tolerated treatment well. Patient demonstrated fatigue post treatment, exhibited good technique with therapeutic exercises, and would benefit from continued PT. Pt preferred to do DL LE strength machines secondary to reporting increased soreness when doing SL. Pt has been doing well in therapy and reporting minimal pain if any at all. Will continue to monitor pt as she starts on the muscle relaxer and steroid pack.      Plan: Continue per plan of care.  Progress treatment as tolerated.       Precautions: DM      Manuals 1/9 1/10 1/15 1/18 1/23 1/25 1/30 2/1     L/S PA mobs CPA GII/GIII CPA GII/GIII CPA GII/GIII CPA GIII CPA GIII CPA GIII  CPA GIII     L/S STM    RE                                   Neuro Re-Ed             Anti-rotation       6# 5\"x10 6# 5\"x10     AB  5\"x10 5\"x10 5\"x15  5\"x15       AB c ADD  5\"x10     5\"x20 5\"x20     Steamboats B Abd & ext 15x       Abd & ext RTB 2x10 ea     Resisted LTR   3\"x15 3\"x20  3\"X20 3\"x20 3\"x20                               Ther Ex             Prone hip ext        10x ea     L/S ext     20x  2x10 2x10      LAQ  11# 2x10 11# 20x 11# 20x 11# 20x 11# 20x SL 11# 2x10 B 11# 2x10      HS Curl  11# 2x10 11# 20x 11# 20x 11# 20x 11# 20x SL 11# 20x 11# 2x10     NuStep  L3 7' L3 6' L3 8' L3 7' L4 8' L4 8' L3 7' bike     bridges  3\"x10 3\"x15 3\"x15  " "3\"x20 3\"X20 3\"x20     Open books  5\"x10 B 5\"x10 B 5\"x10 B         Leg press    50# 20x 50# 20x 50# 20x 50# 20x 50# 20x     HS S        15\"x4 B     PPU     2x10 2x10 15x      Ther Activity                                       Gait Training                                       Modalities                                                        "

## 2024-02-06 ENCOUNTER — EVALUATION (OUTPATIENT)
Dept: PHYSICAL THERAPY | Facility: CLINIC | Age: 69
End: 2024-02-06
Payer: COMMERCIAL

## 2024-02-06 DIAGNOSIS — G89.29 CHRONIC MIDLINE LOW BACK PAIN, UNSPECIFIED WHETHER SCIATICA PRESENT: Primary | ICD-10-CM

## 2024-02-06 DIAGNOSIS — M54.50 CHRONIC MIDLINE LOW BACK PAIN, UNSPECIFIED WHETHER SCIATICA PRESENT: Primary | ICD-10-CM

## 2024-02-06 PROCEDURE — 97112 NEUROMUSCULAR REEDUCATION: CPT

## 2024-02-06 PROCEDURE — 97530 THERAPEUTIC ACTIVITIES: CPT

## 2024-02-06 PROCEDURE — 97110 THERAPEUTIC EXERCISES: CPT

## 2024-02-06 NOTE — PROGRESS NOTES
Re-Evaluation     Today's date: 2024  Patient name: Lydia Patel  : 1955  MRN: [id number]  Referring provider: Alyson Maria  Dx:   Encounter Diagnosis     ICD-10-CM    1. Chronic midline low back pain, unspecified whether sciatica present  M54.50     G89.29           Start Time: 1441  Stop Time: 1530  Total time in clinic (min): 49 minutes      Subjective: The patient presents for re-evaluation today. The patient reports improvement in symptoms since beginning skilled physical therapy. The patient reports 5/10 pain at it's worst over the past 24 hours and reports improvement in overall condition since beginning formal PT care. The patient's chief remaining concern is pain.     Objective: See treatment diary below    Static Posture   Lumbar Spine   Decreased lordosis.   Palpation   Left   Tenderness of the lumbar paraspinals.   Right   Tenderness of the lumbar paraspinals.     Active Range of Motion     Lumbar   Normal active range of motion  Left Hip   Normal active range of motion    Right Hip   Normal active range of motion    Joint Play   Joints within functional limits: T11, T12, L1, L2 and L3   Hypomobile: L4, L5 and S1     Strength/Myotome Testing     Left Hip   Planes of Motion   Flexion: 4+  Extension: 4  Abduction: 3+    Right Hip   Planes of Motion   Flexion: 4+  Extension: 4  Abduction: 3+    Tests   Lumbar   Left   Negative crossed SLR and slump test.   Right   Negative crossed SLR and slump test.     General Comments:  Lumbar Comments  Pain free L/S AROM & MMT    Assessment: Lydia Patel is a pleasant 68 y.o. female who has been receiving PT intervention for lumbar radiculopathy. The patient has demonstrated decreased pain, increased strength, increased ROM, increased joint mobility, improved body mechanics, improved posture , and increased activity tolerance since beginning treatment. Pt noted overall improvements today however is on the prednisone that does end tomorrow therefore  "will continue to monitor pts pain levels and overall progress following the conclusion of the steroid pack.      Primary remaining impairments include:    1)  strength/endurance    2)  pain levels     Goals  Short Term Goals: to be achieved by 4 weeks  1) Patient to be independent with basic HEP- MET  2) Decrease pain to 3/10 at its worst- PROGRESSING  3) Increase lumbar spine AROM to WFL, pain free- MET  4) Increase LE strength by 1/2 MMT grade in all deficient planes- MET    Long Term Goals: to be achieved by discharge  1) FOTO equal to or greater than projected goal.  2) Patient to be independent with comprehensive HEP  3) Lumbar spine joint mobs WFL  4) Increase LE strength to 5/5 MMT grade in all planes to improve a/iadls  5) Patient to report no sleep interruption secondary to pain  6) Increase tolerance for seated activities to >60 min    Plan: Continue per plan of care.  Progress treatment as tolerated.  2x/wk for 6 wks     Precautions: DM      Manuals 1/9 1/10 1/15 1/18 1/23 1/25 1/30 2/1 2/6    L/S PA mobs CPA GII/GIII CPA GII/GIII CPA GII/GIII CPA GIII CPA GIII CPA GIII  CPA GIII CPA GIII    L/S STM    RE                                   Neuro Re-Ed             Anti-rotation       6# 5\"x10 6# 5\"x10 6# 5\"x10    AB  5\"x10 5\"x10 5\"x15  5\"x15       AB c ADD  5\"x10     5\"x20 5\"x20     Steamboats B Abd & ext 15x       Abd & ext RTB 2x10 ea RTB abd & ext 20x ea    Resisted LTR   3\"x15 3\"x20  3\"X20 3\"x20 3\"x20     rows         9# 20x    pulldowns         6# 20x    Ther Ex             Prone hip ext        10x ea     L/S ext     20x  2x10 2x10      LAQ  11# 2x10 11# 20x 11# 20x 11# 20x 11# 20x SL 11# 2x10 B 11# 2x10 11# 2x10 ea     HS Curl  11# 2x10 11# 20x 11# 20x 11# 20x 11# 20x SL 11# 20x 11# 2x10 11# 10x DL 10x SL    NuStep  L3 7' L3 6' L3 8' L3 7' L4 8' L4 8' L3 7' bike L4 8'    bridges  3\"x10 3\"x15 3\"x15  3\"x20 3\"X20 3\"x20     Open books  5\"x10 B 5\"x10 B 5\"x10 B         Leg press    50# 20x 50# 20x 50# 20x " "50# 20x 50# 20x 50# 20x    HS S        15\"x4 B     PPU     2x10 2x10 15x  2x10    Ther Activity             Pt edu         Posture, progress, MMT, ROM, goals- RE                 Gait Training                                       Modalities                                                          "

## 2024-02-07 ENCOUNTER — RA CDI HCC (OUTPATIENT)
Dept: OTHER | Facility: HOSPITAL | Age: 69
End: 2024-02-07

## 2024-02-08 ENCOUNTER — OFFICE VISIT (OUTPATIENT)
Dept: PHYSICAL THERAPY | Facility: CLINIC | Age: 69
End: 2024-02-08
Payer: COMMERCIAL

## 2024-02-08 ENCOUNTER — OFFICE VISIT (OUTPATIENT)
Dept: FAMILY MEDICINE CLINIC | Facility: CLINIC | Age: 69
End: 2024-02-08
Payer: COMMERCIAL

## 2024-02-08 VITALS
DIASTOLIC BLOOD PRESSURE: 82 MMHG | BODY MASS INDEX: 17.72 KG/M2 | HEART RATE: 73 BPM | OXYGEN SATURATION: 99 % | TEMPERATURE: 97 F | WEIGHT: 103.8 LBS | HEIGHT: 64 IN | SYSTOLIC BLOOD PRESSURE: 138 MMHG

## 2024-02-08 DIAGNOSIS — M54.50 CHRONIC MIDLINE LOW BACK PAIN, UNSPECIFIED WHETHER SCIATICA PRESENT: Primary | ICD-10-CM

## 2024-02-08 DIAGNOSIS — Z00.00 MEDICARE ANNUAL WELLNESS VISIT, INITIAL: Primary | ICD-10-CM

## 2024-02-08 DIAGNOSIS — G89.29 CHRONIC MIDLINE LOW BACK PAIN, UNSPECIFIED WHETHER SCIATICA PRESENT: Primary | ICD-10-CM

## 2024-02-08 DIAGNOSIS — Z23 ENCOUNTER FOR IMMUNIZATION: ICD-10-CM

## 2024-02-08 PROCEDURE — 97112 NEUROMUSCULAR REEDUCATION: CPT

## 2024-02-08 PROCEDURE — 97110 THERAPEUTIC EXERCISES: CPT

## 2024-02-08 PROCEDURE — G0009 ADMIN PNEUMOCOCCAL VACCINE: HCPCS | Performed by: NURSE PRACTITIONER

## 2024-02-08 PROCEDURE — 97140 MANUAL THERAPY 1/> REGIONS: CPT

## 2024-02-08 PROCEDURE — 90677 PCV20 VACCINE IM: CPT | Performed by: NURSE PRACTITIONER

## 2024-02-08 PROCEDURE — G0438 PPPS, INITIAL VISIT: HCPCS | Performed by: NURSE PRACTITIONER

## 2024-02-08 NOTE — PROGRESS NOTES
"Daily Note     Today's date: 2024  Patient name: Lydia Patel  : 1955  MRN: [id number]  Referring provider: Alyson Maria  Dx:   Encounter Diagnosis     ICD-10-CM    1. Chronic midline low back pain, unspecified whether sciatica present  M54.50     G89.29           Start Time: 1014  Stop Time: 1056  Total time in clinic (min): 42 minutes    Subjective: Pt reports some minor LB discomfort this AM when turning over in bed however she did have 2 really good days recently with minimal pain. She does make      Objective: See treatment diary below      Assessment: Tolerated treatment well. Patient demonstrated fatigue post treatment, exhibited good technique with therapeutic exercises, and would benefit from continued PT. Pt requires frequent vc for proper performance of exercises. Pt is overall doing well though with LE and core progressions.      Plan: Continue per plan of care.  Progress treatment as tolerated.       Precautions: DM      Manuals   1/15 1/18 1/23 1/25 1/30 2/1 2/6 2/8   L/S PA mobs   CPA GII/GIII CPA GIII CPA GIII CPA GIII  CPA GIII CPA GIII CPA GII/GIII   L/S STM    RE                                   Neuro Re-Ed             Anti-rotation       6# 5\"x10 6# 5\"x10 6# 5\"x10 6# 5\"X10   AB   5\"x10 5\"x15  5\"x15       AB c ADD       5\"x20 5\"x20     Steamboats B        Abd & ext RTB 2x10 ea RTB abd & ext 20x ea RTB ext, abd, flex 20x ea   Resisted LTR   3\"x15 3\"x20  3\"X20 3\"x20 3\"x20     rows         9# 20x 9# 20x   pulldowns         6# 20x 6# 20x   Ther Ex             Prone hip ext        10x ea  2x10 B   L/S ext     20x  2x10 2x10      LAQ   11# 20x 11# 20x 11# 20x 11# 20x SL 11# 2x10 B 11# 2x10 11# 2x10 ea 11# 2x10 SL    HS Curl   11# 20x 11# 20x 11# 20x 11# 20x SL 11# 20x 11# 2x10 11# 10x DL 10x SL 11# 10x DL & SL   NuStep   L3 6' L3 8' L3 7' L4 8' L4 8' L3 7' bike L4 8' L4 8'   bridges   3\"x15 3\"x15  3\"x20 3\"X20 3\"x20     Open books   5\"x10 B 5\"x10 B         Leg press    50# 20x 50# " "20x 50# 20x 50# 20x 50# 20x 50# 20x    HS S        15\"x4 B     PPU     2x10 2x10 15x  2x10 2x10   Ther Activity             Pt edu         Posture, progress, MMT, ROM, goals- RE                 Gait Training                                       Modalities                                                            "

## 2024-02-08 NOTE — PATIENT INSTRUCTIONS
Medicare Preventive Visit Patient Instructions  Thank you for completing your Welcome to Medicare Visit or Medicare Annual Wellness Visit today. Your next wellness visit will be due in one year (2/8/2025).  The screening/preventive services that you may require over the next 5-10 years are detailed below. Some tests may not apply to you based off risk factors and/or age. Screening tests ordered at today's visit but not completed yet may show as past due. Also, please note that scanned in results may not display below.  Preventive Screenings:  Service Recommendations Previous Testing/Comments   Colorectal Cancer Screening  * Colonoscopy    * Fecal Occult Blood Test (FOBT)/Fecal Immunochemical Test (FIT)  * Fecal DNA/Cologuard Test  * Flexible Sigmoidoscopy Age: 45-75 years old   Colonoscopy: every 10 years (may be performed more frequently if at higher risk)  OR  FOBT/FIT: every 1 year  OR  Cologuard: every 3 years  OR  Sigmoidoscopy: every 5 years  Screening may be recommended earlier than age 45 if at higher risk for colorectal cancer. Also, an individualized decision between you and your healthcare provider will decide whether screening between the ages of 76-85 would be appropriate. Colonoscopy: 01/27/2021  FOBT/FIT: Not on file  Cologuard: Not on file  Sigmoidoscopy: 01/27/2021          Breast Cancer Screening Age: 40+ years old  Frequency: every 1-2 years  Not required if history of left and right mastectomy Mammogram: 05/09/2023        Cervical Cancer Screening Between the ages of 21-29, pap smear recommended once every 3 years.   Between the ages of 30-65, can perform pap smear with HPV co-testing every 5 years.   Recommendations may differ for women with a history of total hysterectomy, cervical cancer, or abnormal pap smears in past. Pap Smear: Not on file    Screening Not Indicated   Hepatitis C Screening Once for adults born between 1945 and 1965  More frequently in patients at high risk for Hepatitis C  Hep C Antibody: Not on file        Diabetes Screening 1-2 times per year if you're at risk for diabetes or have pre-diabetes Fasting glucose: No results in last 5 years (No results in last 5 years)  A1C: 8.3 (7/17/2023)  Screening Not Indicated  History Diabetes   Cholesterol Screening Once every 5 years if you don't have a lipid disorder. May order more often based on risk factors. Lipid panel: Not on file    Screening Not Indicated  History Lipid Disorder     Other Preventive Screenings Covered by Medicare:  Abdominal Aortic Aneurysm (AAA) Screening: covered once if your at risk. You're considered to be at risk if you have a family history of AAA.  Lung Cancer Screening: covers low dose CT scan once per year if you meet all of the following conditions: (1) Age 55-77; (2) No signs or symptoms of lung cancer; (3) Current smoker or have quit smoking within the last 15 years; (4) You have a tobacco smoking history of at least 20 pack years (packs per day multiplied by number of years you smoked); (5) You get a written order from a healthcare provider.  Glaucoma Screening: covered annually if you're considered high risk: (1) You have diabetes OR (2) Family history of glaucoma OR (3)  aged 50 and older OR (4)  American aged 65 and older  Osteoporosis Screening: covered every 2 years if you meet one of the following conditions: (1) You're estrogen deficient and at risk for osteoporosis based off medical history and other findings; (2) Have a vertebral abnormality; (3) On glucocorticoid therapy for more than 3 months; (4) Have primary hyperparathyroidism; (5) On osteoporosis medications and need to assess response to drug therapy.   Last bone density test (DXA Scan): Not on file.  HIV Screening: covered annually if you're between the age of 15-65. Also covered annually if you are younger than 15 and older than 65 with risk factors for HIV infection. For pregnant patients, it is covered up to 3  times per pregnancy.    Immunizations:  Immunization Recommendations   Influenza Vaccine Annual influenza vaccination during flu season is recommended for all persons aged >= 6 months who do not have contraindications   Pneumococcal Vaccine   * Pneumococcal conjugate vaccine = PCV13 (Prevnar 13), PCV15 (Vaxneuvance), PCV20 (Prevnar 20)  * Pneumococcal polysaccharide vaccine = PPSV23 (Pneumovax) Adults 19-65 yo with certain risk factors or if 65+ yo  If never received any pneumonia vaccine: recommend Prevnar 20 (PCV20)  Give PCV20 if previously received 1 dose of PCV13 or PPSV23   Hepatitis B Vaccine 3 dose series if at intermediate or high risk (ex: diabetes, end stage renal disease, liver disease)   Respiratory syncytial virus (RSV) Vaccine - COVERED BY MEDICARE PART D  * RSVPreF3 (Arexvy) CDC recommends that adults 60 years of age and older may receive a single dose of RSV vaccine using shared clinical decision-making (SCDM)   Tetanus (Td) Vaccine - COST NOT COVERED BY MEDICARE PART B Following completion of primary series, a booster dose should be given every 10 years to maintain immunity against tetanus. Td may also be given as tetanus wound prophylaxis.   Tdap Vaccine - COST NOT COVERED BY MEDICARE PART B Recommended at least once for all adults. For pregnant patients, recommended with each pregnancy.   Shingles Vaccine (Shingrix) - COST NOT COVERED BY MEDICARE PART B  2 shot series recommended in those 19 years and older who have or will have weakened immune systems or those 50 years and older     Health Maintenance Due:      Topic Date Due   • Hepatitis C Screening  Never done   • Colorectal Cancer Screening  Never done   • Breast Cancer Screening: Mammogram  04/16/2022     Immunizations Due:      Topic Date Due   • Pneumococcal Vaccine: 65+ Years (2 - PCV) 11/02/2018   • COVID-19 Vaccine (6 - 2023-24 season) 01/11/2024     Advance Directives   What are advance directives?  Advance directives are legal  documents that state your wishes and plans for medical care. These plans are made ahead of time in case you lose your ability to make decisions for yourself. Advance directives can apply to any medical decision, such as the treatments you want, and if you want to donate organs.   What are the types of advance directives?  There are many types of advance directives, and each state has rules about how to use them. You may choose a combination of any of the following:  Living will:  This is a written record of the treatment you want. You can also choose which treatments you do not want, which to limit, and which to stop at a certain time. This includes surgery, medicine, IV fluid, and tube feedings.   Durable power of  for healthcare (DPAHC):  This is a written record that states who you want to make healthcare choices for you when you are unable to make them for yourself. This person, called a proxy, is usually a family member or a friend. You may choose more than 1 proxy.  Do not resuscitate (DNR) order:  A DNR order is used in case your heart stops beating or you stop breathing. It is a request not to have certain forms of treatment, such as CPR. A DNR order may be included in other types of advance directives.  Medical directive:  This covers the care that you want if you are in a coma, near death, or unable to make decisions for yourself. You can list the treatments you want for each condition. Treatment may include pain medicine, surgery, blood transfusions, dialysis, IV or tube feedings, and a ventilator (breathing machine).  Values history:  This document has questions about your views, beliefs, and how you feel and think about life. This information can help others choose the care that you would choose.  Why are advance directives important?  An advance directive helps you control your care. Although spoken wishes may be used, it is better to have your wishes written down. Spoken wishes can be  misunderstood, or not followed. Treatments may be given even if you do not want them. An advance directive may make it easier for your family to make difficult choices about your care.   Underweight  Underweight is defined as having a body mass index (BMI) of less than 18.5 kg/m2   Anorexia  is a loss of appetite, decreased food intake, or both. Your appetite naturally decreases as you get older. You also get full faster than you used to. This occurs because your body needs less energy. Other body changes can also lead to a decreased appetite. Even though some appetite loss is normal, you still need to get enough calories and nutrients to keep you healthy. You can start to lose too much weight if you do not eat as much food as your body needs. Unwanted weight loss can cause health problems, or worsen health problems you already have. You can also become dehydrated if you do not drink enough liquid.  How to eat healthy and get enough nutrients:   Choose healthy foods.  Eat a variety of fruits, vegetables, whole grains, low-fat dairy foods, lean meats, and other protein foods. Limit foods high in fat, sugar, and salt. Limit or avoid alcohol as directed. Work with a dietitian to help you plan your meals if you need to follow a special diet. A dietitian can also teach you how to modify foods if you have trouble chewing or swallowing.   Snack on healthy foods between meals  if you only eat a small amount during meals. Snacks provide extra healthy nutrients and calories between meals. Examples include fruit, cheese, and whole grain crackers.   Drink liquids as directed  to avoid dehydration. Drink liquids between meals if they cause you to get full too quickly during meals. Ask how much liquid to drink each day and which liquids are best for you.   Use herbs, spices, and flavor enhancers to add flavor to foods.  Avoid using herbs and spice blends that also contain sodium. Ask your healthcare provider or dietitian about  flavor enhancers. Flavor enhancers with ham, natural lilly, and roast beef flavors can also be sprinkled on food to add flavor.   Share meals with others as often as you can.  Eating with others may help you to eat better during meal time. Ask family members, neighbors, or friends to join you for lunch. There are also senior centers where you can meet people, and share meals with them.   Ask family and friends for help  with shopping or preparing foods. Ask for a ride to the grocery store, if needed.       © Copyright [employer] 2018 Information is for End User's use only and may not be sold, redistributed or otherwise used for commercial purposes. All illustrations and images included in CareNotes® are the copyrighted property of [employer] or [employer]

## 2024-02-08 NOTE — PROGRESS NOTES
Assessment and Plan:     Problem List Items Addressed This Visit    None  Visit Diagnoses     Medicare annual wellness visit, initial    -  Primary    Encounter for immunization        Relevant Orders    Pneumococcal Conjugate Vaccine 20-valent (Pcv20) (Completed)             Preventive health issues were discussed with patient, and age appropriate screening tests were ordered as noted in patient's After Visit Summary.  Personalized health advice and appropriate referrals for health education or preventive services given if needed, as noted in patient's After Visit Summary.     History of Present Illness:     Patient presents for a Medicare Wellness Visit    68 y.o.female presenting to obtain a Medicare annual wellness visit. She originally though that her Medicare agent had done it but no so returned to office to have it performed. She was seen in the office on 01/31/2024 for lower back pain that radiates into left upper leg. She was treated with a muscle relaxer and a course of steroids. She reports that she is feeling better but still has some back pain. She is continuing with physical therapy today. She reports that she is followed by her endocrinologist for diabetes mellitus and hypothyroidism. Her gynecologist manages her osteoporosis and orders annual mammograms. She goes for annual diabetic eye exams and endocrinologist does annual foot exam. She reports that she has had a colonoscopy in the past with a date of 08/28/2013 and she will discuss with gynecologist to have another test scheduled.        Patient Care Team:  MARIO Barreto as PCP - General (Family Medicine)     Review of Systems:     Review of Systems   Constitutional:  Negative for activity change, appetite change and unexpected weight change.   HENT:  Negative for dental problem, ear pain, hearing loss, nosebleeds, sneezing, sore throat, tinnitus and trouble swallowing.    Eyes:  Negative for visual disturbance.   Respiratory:  Negative  for cough, chest tightness, shortness of breath and wheezing.    Cardiovascular:  Negative for chest pain, palpitations and leg swelling.   Gastrointestinal:  Negative for abdominal distention, abdominal pain, constipation, diarrhea and nausea.   Endocrine: Negative for polydipsia, polyphagia and polyuria.   Genitourinary: Negative.    Musculoskeletal:  Positive for back pain. Negative for arthralgias, myalgias and neck pain.   Skin:  Negative for color change and rash.   Allergic/Immunologic: Negative for environmental allergies.   Neurological:  Negative for dizziness, weakness, light-headedness and headaches.   Hematological: Negative.    Psychiatric/Behavioral: Negative.  Negative for dysphoric mood and sleep disturbance. The patient is not nervous/anxious.         Problem List:     Patient Active Problem List   Diagnosis   • JOSE (latent autoimmune diabetes of adulthood) ([facility])   • Vitamin D deficiency   • Mixed hyperlipidemia   • Other specified hypothyroidism   • Age-related osteoporosis without current pathological fracture      Past Medical and Surgical History:     Past Medical History:   Diagnosis Date   • Anxiety disorder    • Diabetes mellitus ([facility])    • Disease of thyroid gland    • GERD (gastroesophageal reflux disease)    • Hyperlipidemia    • Osteoporosis      Past Surgical History:   Procedure Laterality Date   •  SECTION     • HEMORRHOID SURGERY        Family History:     Family History   Problem Relation Age of Onset   • Cancer Mother         skin   • Stroke Mother    • Hypertension Mother    • Hyperlipidemia Mother    • Osteoporosis Mother    • Hypertension Father    • Cancer Father    • Lung cancer Father         mesothelioma   • Depression Sister    • Thyroid disease Sister    • Neurological problems Sister         essential tremor   • Stomach cancer Maternal Grandmother    • Heart attack Maternal Grandfather    • Heart attack Paternal Grandmother    • Pneumonia Paternal  Grandfather    • Completed Suicide  Paternal Uncle 80      Social History:     Social History     Socioeconomic History   • Marital status: Single     Spouse name: None   • Number of children: None   • Years of education: None   • Highest education level: None   Occupational History   • None   Tobacco Use   • Smoking status: Former     Current packs/day: 0.00     Average packs/day: 0.3 packs/day for 5.0 years (1.3 ttl pk-yrs)     Types: Cigarettes     Start date: 1970     Quit date: 1974     Years since quittin.7     Passive exposure: Past   • Smokeless tobacco: Never   Vaping Use   • Vaping status: Never Used   Substance and Sexual Activity   • Alcohol use: Yes     Alcohol/week: 2.0 standard drinks of alcohol     Types: 2 Glasses of wine per week   • Drug use: Never   • Sexual activity: Not Currently     Partners: Male   Other Topics Concern   • None   Social History Narrative   • None     Social Determinants of Health     Financial Resource Strain: Low Risk  (2024)    Overall Financial Resource Strain (CARDIA)    • Difficulty of Paying Living Expenses: Not hard at all   Food Insecurity: Not on file   Transportation Needs: No Transportation Needs (2024)    PRAPARE - Transportation    • Lack of Transportation (Medical): No    • Lack of Transportation (Non-Medical): No   Physical Activity: Not on file   Stress: Not on file   Social Connections: Not on file   Intimate Partner Violence: Not on file   Housing Stability: Not on file      Medications and Allergies:     Current Outpatient Medications   Medication Sig Dispense Refill   • atorvastatin (LIPITOR) 20 mg tablet TAKE 1 TABLET BY MOUTH EVERY DAY AT NIGHT     • Calcium Carb-Cholecalciferol 500-10 MG-MCG CHEW Chew 1 tablet daily     • Continuous Blood Gluc Sensor (FreeStyle Roxanna 3 Sensor) MISC USE AS DIRECTED EVERY 14 DAYS     • cyclobenzaprine (FLEXERIL) 5 mg tablet Take 1 tablet (5 mg total) by mouth daily at bedtime 15 tablet 0   • Jardiance  25 MG TABS Take 25 mg by mouth daily     • Levoxyl 100 MCG tablet Take 100 mcg by mouth every morning     • metFORMIN (GLUCOPHAGE-XR) 500 mg 24 hr tablet TAKE TWO TABLETS BY MOUTH TWICE A DAY WITH FOOD     • Risedronate Sodium 35 MG TBEC      • Trulicity 1.5 MG/0.5ML SOPN INJECT CONTENTS OF 1 PEN UNDER THE SKIN ONCE A WEEK       No current facility-administered medications for this visit.     Allergies   Allergen Reactions   • Bacitracin-Neomycin-Polymyxin Rash   • Anatoliy-Bacit-Poly-Lidocaine Itching      Immunizations:     Immunization History   Administered Date(s) Administered   • COVID-19 PFIZER VACCINE 0.3 ML IM 02/25/2021, 03/18/2021, 10/06/2021   • COVID-19 Pfizer mRNA vacc PF alex-sucrose 12 yr and older (Comirnaty) 11/16/2023   • COVID-19 Pfizer vac (Alex-sucrose, gray cap) 12 yr+ IM 07/14/2022   • INFLUENZA 10/14/2014, 10/31/2016, 11/02/2017, 10/18/2018, 11/01/2019, 10/14/2021, 10/16/2022, 10/21/2023   • Influenza, high dose seasonal 0.7 mL 09/23/2020   • Pneumococcal Conjugate Vaccine 20-valent (Pcv20), Polysace 02/08/2024   • Pneumococcal Polysaccharide PPV23 11/02/2017   • Tdap 10/09/2014   • Zoster 12/16/2010   • Zoster Vaccine Recombinant 05/16/2018, 10/20/2018      Health Maintenance:         Topic Date Due   • Hepatitis C Screening  Never done   • Colorectal Cancer Screening  Never done   • Breast Cancer Screening: Mammogram  04/16/2022         Topic Date Due   • COVID-19 Vaccine (6 - 2023-24 season) 01/11/2024      Medicare Screening Tests and Risk Assessments:     Lydia is here for her Subsequent Wellness visit. Last Medicare Wellness visit information reviewed, patient interviewed and updates made to the record today.      Health Risk Assessment:   Patient rates overall health as very good. Patient feels that their physical health rating is same. Patient is very satisfied with their life. Eyesight was rated as same. Hearing was rated as same. Patient feels that their emotional and mental health  rating is same. Patients states they are never, rarely angry. Patient states they are never, rarely unusually tired/fatigued. Pain experienced in the last 7 days has been some. Patient's pain rating has been 4/10. Patient states that she has experienced no weight loss or gain in last 6 months.     Depression Screening:   PHQ-2 Score: 0      Fall Risk Screening:   In the past year, patient has experienced: no history of falling in past year      Urinary Incontinence Screening:   Patient has not leaked urine accidently in the last six months.     Home Safety:  Patient does not have trouble with stairs inside or outside of their home. Patient has working smoke alarms and has working carbon monoxide detector. Home safety hazards include: none.     Nutrition:   Current diet is Diabetic and Low Carb.     Medications:   Patient is currently taking over-the-counter supplements. OTC medications include: see medication list. Patient is able to manage medications.     Activities of Daily Living (ADLs)/Instrumental Activities of Daily Living (IADLs):   Walk and transfer into and out of bed and chair?: Yes  Dress and groom yourself?: Yes    Bathe or shower yourself?: Yes    Feed yourself? Yes  Do your laundry/housekeeping?: Yes  Manage your money, pay your bills and track your expenses?: Yes  Make your own meals?: Yes    Do your own shopping?: Yes    Previous Hospitalizations:   Any hospitalizations or ED visits within the last 12 months?: No      Advance Care Planning:   Living will: Yes    Durable POA for healthcare: Yes    Advanced directive: Yes      Cognitive Screening:   Provider or family/friend/caregiver concerned regarding cognition?: No    PREVENTIVE SCREENINGS      Cardiovascular Screening:    General: Screening Not Indicated and History Lipid Disorder      Diabetes Screening:     General: Screening Not Indicated and History Diabetes      Cervical Cancer Screening:    General: Screening Not Indicated      Osteoporosis  "Screening:    General: Screening Not Indicated and History Osteoporosis      Lung Cancer Screening:     General: Screening Not Indicated    Screening, Brief Intervention, and Referral to Treatment (SBIRT)    Screening  Typical number of drinks in a day: 0  Typical number of drinks in a week: 0  Interpretation: Low risk drinking behavior.    AUDIT-C Screenin) How often did you have a drink containing alcohol in the past year? never  2) How many drinks did you have on a typical day when you were drinking in the past year? 0  3) How often did you have 6 or more drinks on one occasion in the past year? never    AUDIT-C Score: 0  Interpretation: Score 0-2 (female): Negative screen for alcohol misuse    Single Item Drug Screening:  How often have you used an illegal drug (including marijuana) or a prescription medication for non-medical reasons in the past year? never    Single Item Drug Screen Score: 0  Interpretation: Negative screen for possible drug use disorder    No results found.     Physical Exam:     /82 (BP Location: Left arm, Patient Position: Sitting, Cuff Size: Standard)   Pulse 73   Temp (!) 97 °F (36.1 °C)   Ht 5' 4\" (1.626 m)   Wt 47.1 kg (103 lb 12.8 oz)   SpO2 99%   BMI 17.82 kg/m² (Reviewed)    Physical Exam  Vitals reviewed.   Constitutional:       General: She is not in acute distress.     Appearance: She is not ill-appearing.   HENT:      Head: Normocephalic and atraumatic.      Right Ear: External ear normal.      Left Ear: External ear normal.      Nose: Nose normal.      Mouth/Throat:      Lips: Pink.      Mouth: Mucous membranes are moist.      Pharynx: Oropharynx is clear.   Eyes:      General: Lids are normal.      Extraocular Movements: Extraocular movements intact.      Conjunctiva/sclera: Conjunctivae normal.      Pupils: Pupils are equal, round, and reactive to light.   Cardiovascular:      Rate and Rhythm: Normal rate and regular rhythm.      Pulses:           Carotid " pulses are 2+ on the right side and 2+ on the left side.       Radial pulses are 2+ on the right side and 2+ on the left side.        Dorsalis pedis pulses are 2+ on the right side and 2+ on the left side.        Posterior tibial pulses are 2+ on the right side and 2+ on the left side.      Heart sounds: Normal heart sounds. No murmur heard.     No gallop.   Pulmonary:      Effort: Pulmonary effort is normal.      Breath sounds: Normal breath sounds.   Abdominal:      General: Abdomen is flat. Bowel sounds are normal. There is no distension.      Palpations: Abdomen is soft.      Tenderness: There is no abdominal tenderness.   Musculoskeletal:         General: Tenderness present.      Cervical back: Neck supple.      Right lower leg: No edema.      Left lower leg: No edema.   Skin:     General: Skin is warm and dry.   Neurological:      Mental Status: She is alert and oriented to person, place, and time.   Psychiatric:         Mood and Affect: Mood normal.         Behavior: Behavior normal.          MARIO Barreto

## 2024-02-09 ENCOUNTER — TELEPHONE (OUTPATIENT)
Dept: ADMINISTRATIVE | Facility: OTHER | Age: 69
End: 2024-02-09

## 2024-02-09 NOTE — TELEPHONE ENCOUNTER
Upon review of the In Basket request we were able to locate, review, and update the patient chart as requested for eGFR and Hemoglobin A1c.    Any additional questions or concerns should be emailed to the Practice Liaisons via the appropriate education email address, please do not reply via In Basket.    Thank you  Lena Yi MA         Upon review of the In Basket request we have found/obtained the documentation. After careful review of the document we are unable to complete this request for DEXA Scan because the documentation is considered an External Result. The documentation found is a copy of an original. We require the original document to close the gap(s).    Any additional questions or concerns should be emailed to the Practice Liaisons via the appropriate education email address, please do not reply via In Basket.    Thank you  Lena Yi MA       Upon review of the In Basket request and the patient's chart, initial outreach has been made via fax to facility. Please see Contacts section for details. For DME.    Thank you  Lena Yi MA

## 2024-02-09 NOTE — LETTER
Diabetic Eye Exam Form    Date Requested: 24  Patient: Lydia Patel  Patient : 1955   Referring Provider: MARIO Barreto      DIABETIC Eye Exam Date _______________________________      Type of Exam MUST be documented for Diabetic Eye Exams. Please CHECK ONE.     Retinal Exam       Dilated Retinal Exam       OCT       Optomap-Iris Exam      Fundus Photography       Left Eye - Please check Retinopathy or No Retinopathy        Exam did show retinopathy    Exam did not show retinopathy       Right Eye - Please check Retinopathy or No Retinopathy       Exam did show retinopathy    Exam did not show retinopathy       Comments __________________________________________________________    Practice Providing Exam ______________________________________________    Exam Performed By (print name) _______________________________________      Provider Signature ___________________________________________________      These reports are needed for  compliance.  Please fax this completed form and a copy of the Diabetic Eye Exam report to our office located at [address] [place] [address] as soon as possible via Fax [phone] attention Lena: Phone [phone]  We thank you for your assistance in treating our mutual patient.

## 2024-02-09 NOTE — TELEPHONE ENCOUNTER
----- Message from Consuelo Luna sent at 2/8/2024 12:02 PM EST -----  Regarding: GFR and Microalbulmin  02/08/24 12:49 PM    Hello, our patient Lydia Patel has had Glomerular Filtration Rate (GFR) completed/performed. Please assist in updating the patient chart by pulling the Care Everywhere (CE) document. The date of service is 7/2023.       Sowmya, our patient Lydia Patel has had Urine Microalbumin completed/performed. Please assist in updating the patient chart by pulling the Care Everywhere (CE) document. The date of service is 7/2023.       Sowmya, anjelica patient Lydia Patel has had DEXA Scan completed/performed. Please assist in updating the patient chart by pulling the Care Everywhere (CE) document. The date of service is 05/2023.       Sowmya, anjelica patient Lydia Patel has had Mammogram completed/performed. Please assist in updating the patient chart by making an External outreach to Dr. Armenta facility located in [place], [place]. The date of service is 2023.    Thank you,  Consuelo Luna  [facility]

## 2024-02-09 NOTE — TELEPHONE ENCOUNTER
----- Message from Consuelo Luna sent at 2/8/2024  1:21 PM EST -----  02/08/24 1:22 PM    Hello, our patient Lydia Patel has had Diabetic Eye Exam completed/performed. Please assist in updating the patient chart by making an External outreach to Dr. Armenta facility located in [place], [place]. The date of service is 2023.    Thank you,  Consuelo Luna  [facility]

## 2024-02-12 ENCOUNTER — OFFICE VISIT (OUTPATIENT)
Dept: PHYSICAL THERAPY | Facility: CLINIC | Age: 69
End: 2024-02-12
Payer: COMMERCIAL

## 2024-02-12 DIAGNOSIS — M54.50 CHRONIC MIDLINE LOW BACK PAIN, UNSPECIFIED WHETHER SCIATICA PRESENT: Primary | ICD-10-CM

## 2024-02-12 DIAGNOSIS — G89.29 CHRONIC MIDLINE LOW BACK PAIN, UNSPECIFIED WHETHER SCIATICA PRESENT: Primary | ICD-10-CM

## 2024-02-12 PROCEDURE — 97140 MANUAL THERAPY 1/> REGIONS: CPT

## 2024-02-12 PROCEDURE — 97112 NEUROMUSCULAR REEDUCATION: CPT

## 2024-02-12 PROCEDURE — 97110 THERAPEUTIC EXERCISES: CPT

## 2024-02-12 NOTE — TELEPHONE ENCOUNTER
Upon review of the In Basket request we were able to locate, review, and update the patient chart as requested for Diabetic Eye Exam.    Any additional questions or concerns should be emailed to the Practice Liaisons via the appropriate education email address, please do not reply via In Basket.    Thank you  Lena Yi MA

## 2024-02-13 ENCOUNTER — APPOINTMENT (OUTPATIENT)
Dept: PHYSICAL THERAPY | Facility: CLINIC | Age: 69
End: 2024-02-13
Payer: COMMERCIAL

## 2024-02-15 ENCOUNTER — OFFICE VISIT (OUTPATIENT)
Dept: PHYSICAL THERAPY | Facility: CLINIC | Age: 69
End: 2024-02-15
Payer: COMMERCIAL

## 2024-02-15 DIAGNOSIS — G89.29 CHRONIC MIDLINE LOW BACK PAIN, UNSPECIFIED WHETHER SCIATICA PRESENT: Primary | ICD-10-CM

## 2024-02-15 DIAGNOSIS — M54.50 CHRONIC MIDLINE LOW BACK PAIN, UNSPECIFIED WHETHER SCIATICA PRESENT: Primary | ICD-10-CM

## 2024-02-15 PROCEDURE — 97110 THERAPEUTIC EXERCISES: CPT

## 2024-02-15 PROCEDURE — 97112 NEUROMUSCULAR REEDUCATION: CPT

## 2024-02-15 PROCEDURE — 97140 MANUAL THERAPY 1/> REGIONS: CPT

## 2024-02-15 PROCEDURE — 97530 THERAPEUTIC ACTIVITIES: CPT

## 2024-02-15 NOTE — PROGRESS NOTES
"Daily Note     Today's date: 2/15/2024  Patient name: Lydia Patel  : 1955  MRN: [id number]  Referring provider: Alyson Maria  Dx:   Encounter Diagnosis     ICD-10-CM    1. Chronic midline low back pain, unspecified whether sciatica present  M54.50     G89.29           Start Time: 1354  Stop Time: 1448  Total time in clinic (min): 54 minutes    Subjective: Pt reports she has been having a couple of good days lately and she thinks her back is starting to do much better.      Objective: See treatment diary below      Assessment: Tolerated treatment well. Patient demonstrated fatigue post treatment, exhibited good technique with therapeutic exercises, and would benefit from continued PT. Pt has been doing very well with transition to the [facility]. She will follow up 1x next week with potential d/c to HEP if pt feels comfortable. Discussed with pt how she can rotate through exercises on different days to not do everything all at once or doing 2-3 days/wk to not get overwhelmed. Pt verbalized understanding.      Plan: Continue per plan of care.  Potential discharge next visit.     Precautions: DM      Manuals 2/12 2/15   1/23 1/25 1/30 2/1 2/6 2   L/S PA mobs CPA GII/GIII CPA GII/GIII   CPA GIII CPA GIII  CPA GIII CPA GIII CPA GII/GIII   L/S STM RE                                      Neuro Re-Ed             Anti-rotation 6# 5\"X10 6# 5\"X10     6# 5\"x10 6# 5\"x10 6# 5\"x10 6# 5\"X10   AB      5\"x15       AB c ADD       5\"x20 5\"x20     Steamboats B  RTB 20x ea ext, abd, flex      Abd & ext RTB 2x10 ea RTB abd & ext 20x ea RTB ext, abd, flex 20x ea   Resisted LTR 3\"x20 3\"x30    3\"X20 3\"x20 3\"x20     rows 9# 20x 10# 20x       9# 20x 9# 20x   pulldowns 6# 20x 7# 20x       6# 20x 6# 20x   Ther Ex             Prone hip ext 2x10 2x10      10x ea  2x10 B   L/S ext     20x  2x10 2x10      LAQ 11# 20x ea SL 11# 20x   11# 20x 11# 20x SL 11# 2x10 B 11# 2x10 11# 2x10 ea 11# 2x10 SL    HS Curl 11# 10x SL & DL 11# 10x SL & DL " "  11# 20x 11# 20x SL 11# 20x 11# 2x10 11# 10x DL 10x SL 11# 10x DL & SL   NuStep L4 8' L5 8'   L3 7' L4 8' L4 8' L3 7' bike L4 8' L4 8'   bridges  Pball 3\"X20    3\"x20 3\"X20 3\"x20     Open books             Leg press 60# 20x 60# 20x   50# 20x 50# 20x 50# 20x 50# 20x 50# 20x    HS S  15\"X4      15\"x4 B     PPU  10x   2x10 2x10 15x  2x10 2x10   Ther Activity             Pt edu  Gym, exercise routine, d/c- RE       Posture, progress, MMT, ROM, goals- RE                 Gait Training                                       Modalities                                                                "

## 2024-02-16 ENCOUNTER — TELEPHONE (OUTPATIENT)
Dept: ADMINISTRATIVE | Facility: OTHER | Age: 69
End: 2024-02-16

## 2024-02-16 NOTE — TELEPHONE ENCOUNTER
Upon review of the In Basket request we were able to locate, review, and update the patient chart as requested for DEXA Scan.    Any additional questions or concerns should be emailed to the Practice Liaisons via the appropriate education email address, please do not reply via In Basket.    Thank you  Angela Ray

## 2024-02-16 NOTE — TELEPHONE ENCOUNTER
----- Message from Cortney Cooper MA sent at 2/15/2024  1:42 PM EST -----  Regarding: care gap request  02/15/24 1:42 PM    Hello, our patient attached above has had DEXA Scan completed/performed. Please assist in updating the patient chart by pulling the Care Everywhere (CE) document. The date of service is 2023.     Thank you,  Cortney Cooper PG [facility]

## 2024-02-22 ENCOUNTER — OFFICE VISIT (OUTPATIENT)
Dept: PHYSICAL THERAPY | Facility: CLINIC | Age: 69
End: 2024-02-22
Payer: COMMERCIAL

## 2024-02-22 DIAGNOSIS — M54.50 CHRONIC MIDLINE LOW BACK PAIN, UNSPECIFIED WHETHER SCIATICA PRESENT: Primary | ICD-10-CM

## 2024-02-22 DIAGNOSIS — G89.29 CHRONIC MIDLINE LOW BACK PAIN, UNSPECIFIED WHETHER SCIATICA PRESENT: Primary | ICD-10-CM

## 2024-02-22 PROCEDURE — 97530 THERAPEUTIC ACTIVITIES: CPT

## 2024-02-22 PROCEDURE — 97110 THERAPEUTIC EXERCISES: CPT

## 2024-02-22 PROCEDURE — 97112 NEUROMUSCULAR REEDUCATION: CPT

## 2024-02-22 NOTE — PROGRESS NOTES
"Daily Note & Discharge    Today's date: 2024  Patient name: Lydia Patel  : 1955  MRN: [id number]  Referring provider: Alyosn Maria  Dx:   Encounter Diagnosis     ICD-10-CM    1. Chronic midline low back pain, unspecified whether sciatica present  M54.50     G89.29           Start Time: 1032  Stop Time: 1112  Total time in clinic (min): 40 minutes    Subjective: Pt reports she is doing very well at the [facility] and doing all of her exercises. Pt notes if she has had pain, it is low and a 2/10, however she feels better when using abdominal brace.      Objective: See treatment diary below      Assessment: Pt presents to PT for d/c with improvements in ROM, strength, pain levels and overall functional activity tolerance. Pt has returned to activities pain free and is able to manage symptoms on own with HEP. HEP has been provided and reviewed with pt with verbalized understanding. Pt is in agreement with above POC.     Goals  Short Term Goals: to be achieved by 4 weeks  1) Patient to be independent with basic HEP- MET  2) Decrease pain to 3/10 at its worst- PROGRESSING  3) Increase lumbar spine AROM to WFL, pain free- MET  4) Increase LE strength by 1/2 MMT grade in all deficient planes- MET    Long Term Goals: to be achieved by discharge  1) FOTO equal to or greater than projected goal- MET  2) Patient to be independent with comprehensive HEP- MET  3) Lumbar spine joint mobs WFL- MET  4) Increase LE strength to 5/5 MMT grade in all planes to improve a/iadls- PROGRESSING (GYM)  5) Patient to report no sleep interruption secondary to pain- MET  6) Increase tolerance for seated activities to >60 min- MET    Plan:  discharge from PT     Precautions: DM      Manuals 2/12 2/15 2/22  1/23 1/25 1/30 2/1 2/6 2/8   L/S PA mobs CPA GII/GIII CPA GII/GIII   CPA GIII CPA GIII  CPA GIII CPA GIII CPA GII/GIII   L/S STM RE                                      Neuro Re-Ed             Anti-rotation 6# 5\"X10 6# 5\"X10     " "6# 5\"x10 6# 5\"x10 6# 5\"x10 6# 5\"X10   AB      5\"x15       AB c ADD       5\"x20 5\"x20     Steamboats B  RTB 20x ea ext, abd, flex RTB 20x ea     Abd & ext RTB 2x10 ea RTB abd & ext 20x ea RTB ext, abd, flex 20x ea   Resisted LTR 3\"x20 3\"x30    3\"X20 3\"x20 3\"x20     rows 9# 20x 10# 20x 10# 20x      9# 20x 9# 20x   pulldowns 6# 20x 7# 20x 7# 20x      6# 20x 6# 20x   Ther Ex             Prone hip ext 2x10 2x10      10x ea  2x10 B   L/S ext     20x  2x10 2x10      LAQ 11# 20x ea SL 11# 20x 11 2x10x BLE  11# 20x 11# 20x SL 11# 2x10 B 11# 2x10 11# 2x10 ea 11# 2x10 SL    HS Curl 11# 10x SL & DL 11# 10x SL & DL 11# 2x10 SL  11# 20x 11# 20x SL 11# 20x 11# 2x10 11# 10x DL 10x SL 11# 10x DL & SL   NuStep L4 8' L5 8' L5 8'  L3 7' L4 8' L4 8' L3 7' bike L4 8' L4 8'   bridges  Pball 3\"X20    3\"x20 3\"X20 3\"x20     Open books             Leg press 60# 20x 60# 20x 60# 20x  50# 20x 50# 20x 50# 20x 50# 20x 50# 20x    HS S  15\"X4 15\"X4 B     15\"x4 B     PPU  10x   2x10 2x10 15x  2x10 2x10   Ther Activity             Pt edu  Gym, exercise routine, d/c- RE Gym, goals, progres,s d/c- RE      Posture, progress, MMT, ROM, goals- RE                 Gait Training                                       Modalities                                                                  "

## 2024-03-14 ENCOUNTER — OFFICE VISIT (OUTPATIENT)
Age: 69
End: 2024-03-14
Payer: COMMERCIAL

## 2024-03-14 VITALS
SYSTOLIC BLOOD PRESSURE: 121 MMHG | DIASTOLIC BLOOD PRESSURE: 65 MMHG | BODY MASS INDEX: 17.58 KG/M2 | WEIGHT: 103 LBS | HEART RATE: 69 BPM | HEIGHT: 64 IN

## 2024-03-14 DIAGNOSIS — M99.03 SEGMENTAL DYSFUNCTION OF LUMBAR REGION: Primary | ICD-10-CM

## 2024-03-14 DIAGNOSIS — M47.816 LUMBAR SPONDYLOSIS: ICD-10-CM

## 2024-03-14 DIAGNOSIS — M99.02 SEGMENTAL DYSFUNCTION OF THORACIC REGION: ICD-10-CM

## 2024-03-14 DIAGNOSIS — M99.04 SEGMENTAL DYSFUNCTION OF SACRAL REGION: ICD-10-CM

## 2024-03-14 PROCEDURE — 98941 CHIROPRACT MANJ 3-4 REGIONS: CPT | Performed by: CHIROPRACTOR

## 2024-03-14 PROCEDURE — 99202 OFFICE O/P NEW SF 15 MIN: CPT | Performed by: CHIROPRACTOR

## 2024-03-14 NOTE — PROGRESS NOTES
Initial date of service:    Diagnoses and all orders for this visit:    Segmental dysfunction of lumbar region    Lumbar spondylosis    Segmental dysfunction of sacral region    Segmental dysfunction of thoracic region       ASSESSMENT:  No red flags, radiculopathy or neurologic deficit appreciated clinically. Pt's symptoms and exam findings consistent with lumbar spondylosis with associated segmental dysfunction secondary to repetitive st/sp injury, exacerbated by postural/ergonomic stressors. Pt responded well to flexion biased stretches and manual mobilization of the affected spinal and myofascial tissues with increased ROM; trial of conservative tx recommended consisting of stretching, graded mobilization/manipulation of the affected spinal and myofascial jt dysfunction, postural/ergonomic education and take home stretches/exercises. If symptoms fail to improve with short trial of conservative care, appropriate imaging and referral will be coordinated.  Spent greater than 29 min c pt discussing hx, pe, ddx, tx options and reviewing notes/imaging    PROCEDURE CODES: 45884-KN, 81456-40    TREATMENT:  Fear avoidance behavior discussion; encouraged and reassured pt that natural course of condition is to improve over time with adherence to tx plan and home care strategies. Home care recommendations: avoid bed rest, walk (but avoid trails and uneven surfaces), gradual return to activity to tolerance (avoid anything that peripheralizes symptoms), call if symptoms peripheralize, worsen, or neurologic deficit progresses. Ther-ex: IASTM; discussed post procedure soreness and/or ecchymosis for up to 36 hrs, applied to affected mm hypertonicities; supine hamstring stretch, supine gluteal stretch, side laying QL stretch, single knee to chest stretch, hip flexor pin-and-stretch, alternating prone hip extension, glute bridge, transitional mvmt education, abdominal bracing; greater than 15 min spent performing above mentioned  ther-ex to improve ROM/flexibility. Thoracic mobilization/manipulation: prone P-A mob, supine A-P manip; Lumbar mobilization/manipulation: diversified side laying graded HVLA, flexion-traction; SIJ Manipulation/Mobilization: R/L SIJ HVLA - long axis distraction, dobbs drop table maneuver to affected SIJ    HPI:  Lydia Patel is a 68 y.o. female  Chief Complaint   Patient presents with   • Back Pain     Lower lumbar pain that radiates around left hip into left groin area. Patient states happens every morning getting out of bed and intermittent getting up from a chair. Pain score  2 now    Pain score in the morning pain score 6        The patient presents to the office with lower back pain that started in November when picking up a heavy planter, the patient has images and course of PT which helped 66% of the pain but still has on spot that is an issue in the morning. Getting on cough and once finished coffee pain is better. The patient has no pain in to the extremities. Continues stretches and exercises from PT that ended 2 wks ago. She has had Chiropractic.     Back Pain  Pertinent negatives include no chest pain, fever, headaches, numbness or weakness.     Past Medical History:   Diagnosis Date   • Anxiety disorder    • Diabetes mellitus (HCC)    • Disease of thyroid gland    • GERD (gastroesophageal reflux disease)    • Hyperlipidemia    • Osteoporosis       Past Surgical History:   Procedure Laterality Date   •  SECTION     • HEMORRHOID SURGERY       The following portions of the patient's history were reviewed and updated as appropriate: allergies, past family history, past medical history, past social history, past surgical history, and problem list.  Review of Systems   Constitutional:  Negative for activity change, fatigue, fever and unexpected weight change.   HENT:  Negative for ear pain, hearing loss, sinus pressure, sinus pain, sore throat and tinnitus.    Respiratory:  Negative  for chest tightness, shortness of breath, wheezing and stridor.    Cardiovascular:  Negative for chest pain.   Genitourinary:  Negative for flank pain and frequency.   Musculoskeletal:  Positive for back pain. Negative for joint swelling, neck pain and neck stiffness.   Skin:  Negative for color change and pallor.   Neurological:  Negative for dizziness, speech difficulty, weakness, numbness and headaches.   Psychiatric/Behavioral:  Negative for agitation and sleep disturbance. The patient is not nervous/anxious.      Physical Exam  Constitutional:       General: She is not in acute distress.     Appearance: Normal appearance.   HENT:      Head: Normocephalic.      Mouth/Throat:      Mouth: Mucous membranes are moist.   Eyes:      Extraocular Movements: Extraocular movements intact.      Conjunctiva/sclera: Conjunctivae normal.      Pupils: Pupils are equal, round, and reactive to light.   Neck:      Vascular: No carotid bruit.   Pulmonary:      Effort: Pulmonary effort is normal.   Chest:      Chest wall: No tenderness.   Abdominal:      General: Abdomen is flat.      Palpations: Abdomen is soft.   Musculoskeletal:         General: No swelling, deformity or signs of injury.      Cervical back: Normal range of motion. No rigidity or tenderness.      Thoracic back: Spasms and tenderness present. Decreased range of motion.      Lumbar back: Spasms and tenderness present. No swelling, edema, deformity, signs of trauma or lacerations. Decreased range of motion.        Back:       Right lower leg: No edema.      Left lower leg: No edema.   Lymphadenopathy:      Cervical: No cervical adenopathy.   Skin:     General: Skin is warm.      Coloration: Skin is not jaundiced or pale.      Findings: No bruising or erythema.   Neurological:      Mental Status: She is alert and oriented to person, place, and time.      Cranial Nerves: No cranial nerve deficit.      Sensory: No sensory deficit.      Motor: No weakness.      Gait:  Gait is intact.      Deep Tendon Reflexes: Reflexes are normal and symmetric.   Psychiatric:         Attention and Perception: Attention normal.         Mood and Affect: Mood and affect normal.         Speech: Speech normal.         Behavior: Behavior normal. Behavior is cooperative.         Thought Content: Thought content normal.         Cognition and Memory: Cognition normal.         Judgment: Judgment normal.       SOFT TISSUE ASSESSMENT Hypertonicity and tenderness palpated B T10-S1 erector spinae, hip flexor, glute med/min, QL, hamstring JOINT RESTRICTIONS: T10-S1 and L SIJ ORTHO: SI jt point tenderness: +; Iliana unremarkable for centralization/peripheralization; jerry's, iliac compression, thigh thrust elicit lbp in L SIJ; prone femoral nerve stretch neg for upper lumbar neural tension, elicits R/L SIJ stiffness; sitting root elicits no lbp on R/L; slump test elicits no neural tension R/L    Return in about 1 week (around 3/21/2024) for Recheck.

## 2024-04-03 ENCOUNTER — OFFICE VISIT (OUTPATIENT)
Dept: FAMILY MEDICINE CLINIC | Facility: CLINIC | Age: 69
End: 2024-04-03
Payer: COMMERCIAL

## 2024-04-03 VITALS
WEIGHT: 109 LBS | OXYGEN SATURATION: 98 % | HEART RATE: 82 BPM | BODY MASS INDEX: 18.61 KG/M2 | DIASTOLIC BLOOD PRESSURE: 78 MMHG | HEIGHT: 64 IN | TEMPERATURE: 97.9 F | SYSTOLIC BLOOD PRESSURE: 110 MMHG

## 2024-04-03 DIAGNOSIS — H61.21 IMPACTED CERUMEN OF RIGHT EAR: ICD-10-CM

## 2024-04-03 DIAGNOSIS — H69.91 EUSTACHIAN TUBE DYSFUNCTION, RIGHT: Primary | ICD-10-CM

## 2024-04-03 PROCEDURE — 99214 OFFICE O/P EST MOD 30 MIN: CPT | Performed by: NURSE PRACTITIONER

## 2024-04-03 PROCEDURE — 69210 REMOVE IMPACTED EAR WAX UNI: CPT | Performed by: NURSE PRACTITIONER

## 2024-04-03 NOTE — PROGRESS NOTES
"Assessment/Plan:     Diagnoses and all orders for this visit:    Eustachian tube dysfunction, right    Impacted cerumen of right ear  -     Ear cerumen removal        #1 Eustachian tube dysfunction, right  Discussed with patient that the impacted cerumen possible clogged eustachian tube leading to sore throat pain  Discussed with patient to continue conservative measures  #2 Impacted cerumen of right ear  Discussed with patient plan to remove cerumen from the ear and further assess  Patient instructed to call if no improvement in 72 hours or symptoms worsen    Subjective:      Patient ID: Lydia Patel is a 68 y.o. female.    68 y.o.female presenting with right ear pain for the past few weeks. She reports last night she started to develop a sore throat. She denies chills, fever, fatigue, headache, nasal congestion, cough, shortness of breath or generalized body aches.      The following portions of the patient's history were reviewed and updated as appropriate: allergies, current medications, past family history, past medical history, past social history, past surgical history, and problem list.    Review of Systems   Constitutional:  Negative for chills, fatigue and fever.   HENT:  Positive for ear pain and sore throat. Negative for congestion, postnasal drip, rhinorrhea, sinus pressure and sinus pain.    Respiratory: Negative.     Cardiovascular: Negative.    Gastrointestinal: Negative.    Musculoskeletal: Negative.    Neurological: Negative.    Psychiatric/Behavioral: Negative.         Objective:    /78 (BP Location: Left arm, Patient Position: Sitting, Cuff Size: Standard)   Pulse 82   Temp 97.9 °F (36.6 °C)   Ht 5' 4\" (1.626 m)   Wt 49.4 kg (109 lb)   SpO2 98%   BMI 18.71 kg/m² (Reviewed)     Physical Exam  Vitals reviewed.   Constitutional:       General: She is not in acute distress.     Appearance: She is well-developed and well-groomed. She is not ill-appearing.   HENT:      Head: Normocephalic " and atraumatic.      Right Ear: Tympanic membrane, ear canal and external ear normal. There is impacted cerumen.      Left Ear: Tympanic membrane, ear canal and external ear normal.      Nose: Nose normal.      Mouth/Throat:      Mouth: Mucous membranes are moist.      Pharynx: Oropharynx is clear. Posterior oropharyngeal erythema present.   Eyes:      General: Lids are normal.      Extraocular Movements: Extraocular movements intact.      Conjunctiva/sclera: Conjunctivae normal.      Pupils: Pupils are equal, round, and reactive to light.   Neck:      Trachea: Trachea and phonation normal.   Cardiovascular:      Rate and Rhythm: Normal rate and regular rhythm.      Heart sounds: Normal heart sounds.   Pulmonary:      Effort: Pulmonary effort is normal.      Breath sounds: Normal breath sounds.   Musculoskeletal:      Cervical back: Neck supple.   Skin:     General: Skin is warm and dry.      Capillary Refill: Capillary refill takes less than 2 seconds.   Neurological:      General: No focal deficit present.      Mental Status: She is alert and oriented to person, place, and time.   Psychiatric:         Mood and Affect: Mood normal.         Behavior: Behavior normal. Behavior is cooperative.         Ear cerumen removal    Date/Time: 4/3/2024 1:00 PM    Performed by: MARIO Barreto  Authorized by: MARIO Barreto  Universal Protocol:  Consent: Verbal consent obtained.  Risks and benefits: risks, benefits and alternatives were discussed  Consent given by: patient  Timeout called at: 4/3/2024 1:15 PM.  Patient understanding: patient states understanding of the procedure being performed    Patient location:  Clinic  Procedure details:     Local anesthetic:  None    Location:  R ear    Procedure type: irrigation with instrumentation      Instrumentation: loop      Approach:  Natural orifice    Visualization (free text):  Otoscope  Post-procedure details:     Complication:  None    Hearing quality:  Improved     Patient tolerance of procedure:  Tolerated well, no immediate complications

## 2024-04-04 ENCOUNTER — PROCEDURE VISIT (OUTPATIENT)
Age: 69
End: 2024-04-04
Payer: COMMERCIAL

## 2024-04-04 VITALS
DIASTOLIC BLOOD PRESSURE: 73 MMHG | HEIGHT: 64 IN | WEIGHT: 109 LBS | HEART RATE: 74 BPM | SYSTOLIC BLOOD PRESSURE: 113 MMHG | BODY MASS INDEX: 18.61 KG/M2

## 2024-04-04 DIAGNOSIS — M99.02 SEGMENTAL DYSFUNCTION OF THORACIC REGION: ICD-10-CM

## 2024-04-04 DIAGNOSIS — M47.816 LUMBAR SPONDYLOSIS: ICD-10-CM

## 2024-04-04 DIAGNOSIS — M99.03 SEGMENTAL DYSFUNCTION OF LUMBAR REGION: Primary | ICD-10-CM

## 2024-04-04 DIAGNOSIS — M99.04 SEGMENTAL DYSFUNCTION OF SACRAL REGION: ICD-10-CM

## 2024-04-04 PROCEDURE — 98941 CHIROPRACT MANJ 3-4 REGIONS: CPT | Performed by: CHIROPRACTOR

## 2024-04-04 NOTE — PROGRESS NOTES
Initial date of service:    Diagnoses and all orders for this visit:    Segmental dysfunction of lumbar region    Lumbar spondylosis    Segmental dysfunction of sacral region    Segmental dysfunction of thoracic region       ASSESSMENT:  No red flags, radiculopathy or neurologic deficit appreciated clinically. Pt's symptoms and exam findings consistent with lumbar spondylosis with associated segmental dysfunction secondary to repetitive st/sp injury, exacerbated by postural/ergonomic stressors. Pt responded well to flexion biased stretches and manual mobilization of the affected spinal and myofascial tissues with increased ROM; trial of conservative tx recommended consisting of stretching, graded mobilization/manipulation of the affected spinal and myofascial jt dysfunction, postural/ergonomic education and take home stretches/exercises. If symptoms fail to improve with short trial of conservative care, appropriate imaging and referral will be coordinated.  -Improvements in pain and mm spasm in lower back and glutes post-trx    PROCEDURE CODES: 02833-AN    TREATMENT:  Fear avoidance behavior discussion; encouraged and reassured pt that natural course of condition is to improve over time with adherence to tx plan and home care strategies. Home care recommendations: avoid bed rest, walk (but avoid trails and uneven surfaces), gradual return to activity to tolerance (avoid anything that peripheralizes symptoms), call if symptoms peripheralize, worsen, or neurologic deficit progresses. Ther-ex: IASTM; discussed post procedure soreness and/or ecchymosis for up to 36 hrs, applied to affected mm hypertonicities; supine hamstring stretch, supine gluteal stretch, side laying QL stretch, single knee to chest stretch, hip flexor pin-and-stretch, alternating prone hip extension, glute bridge, transitional mvmt education, abdominal bracing; greater than 15 min spent performing above mentioned ther-ex to improve ROM/flexibility.  Thoracic mobilization/manipulation: prone P-A mob, supine A-P manip; Lumbar mobilization/manipulation: diversified side laying graded HVLA, flexion-traction; SIJ Manipulation/Mobilization: R/L SIJ HVLA - long axis distraction, dobbs drop table maneuver to affected SIJ    HPI:  Lydia Patel is a 68 y.o. female  Chief Complaint   Patient presents with   • Back Pain     Low back no pain currently, has pain getting out of bed in the morning down to a 2 now on the left side      The patient presents to the office with lower back pain that started in November when picking up a heavy planter, the patient has images and course of PT which helped 66% of the pain but still has on spot that is an issue in the morning. Getting on cough and once finished coffee pain is better. The patient has no pain in to the extremities. Continues stretches and exercises from PT that ended 2 wks ago. She has had Chiropractic.   - The patient reports she has improvements in the pain, the patient mentions its still there.      Back Pain      Past Medical History:   Diagnosis Date   • Anxiety disorder    • Diabetes mellitus (HCC)    • Disease of thyroid gland    • GERD (gastroesophageal reflux disease)    • Hyperlipidemia    • Osteoporosis       Past Surgical History:   Procedure Laterality Date   •  SECTION     • HEMORRHOID SURGERY       The following portions of the patient's history were reviewed and updated as appropriate: allergies, past family history, past medical history, past social history, past surgical history, and problem list.  Review of Systems   Musculoskeletal:  Positive for back pain.     Physical Exam  Musculoskeletal:         General: Tenderness present.      Thoracic back: Spasms and tenderness present. Decreased range of motion.      Lumbar back: Spasms and tenderness present. No swelling, edema, deformity, signs of trauma or lacerations. Decreased range of motion.        Back:    Neurological:       Gait: Gait is intact.      Deep Tendon Reflexes: Reflexes are normal and symmetric.       SOFT TISSUE ASSESSMENT Hypertonicity and tenderness palpated B T10-S1 erector spinae, hip flexor, glute med/min, QL, hamstring JOINT RESTRICTIONS: T10-S1 and L SIJ ORTHO: SI jt point tenderness: +; Iliana unremarkable for centralization/peripheralization; jerry's, iliac compression, thigh thrust elicit lbp in L SIJ; prone femoral nerve stretch neg for upper lumbar neural tension, elicits R/L SIJ stiffness; sitting root elicits no lbp on R/L; slump test elicits no neural tension R/L    Return in about 1 week (around 4/11/2024) for Recheck.

## 2024-04-08 ENCOUNTER — OFFICE VISIT (OUTPATIENT)
Dept: FAMILY MEDICINE CLINIC | Facility: CLINIC | Age: 69
End: 2024-04-08
Payer: COMMERCIAL

## 2024-04-08 VITALS
OXYGEN SATURATION: 98 % | SYSTOLIC BLOOD PRESSURE: 120 MMHG | DIASTOLIC BLOOD PRESSURE: 70 MMHG | HEIGHT: 64 IN | WEIGHT: 108.5 LBS | HEART RATE: 80 BPM | BODY MASS INDEX: 18.52 KG/M2 | TEMPERATURE: 97.3 F

## 2024-04-08 DIAGNOSIS — J01.00 ACUTE NON-RECURRENT MAXILLARY SINUSITIS: Primary | ICD-10-CM

## 2024-04-08 DIAGNOSIS — B37.9 YEAST INFECTION: ICD-10-CM

## 2024-04-08 PROCEDURE — G2211 COMPLEX E/M VISIT ADD ON: HCPCS | Performed by: NURSE PRACTITIONER

## 2024-04-08 PROCEDURE — 99214 OFFICE O/P EST MOD 30 MIN: CPT | Performed by: NURSE PRACTITIONER

## 2024-04-08 RX ORDER — AMOXICILLIN AND CLAVULANATE POTASSIUM 875; 125 MG/1; MG/1
1 TABLET, FILM COATED ORAL EVERY 12 HOURS SCHEDULED
Qty: 14 TABLET | Refills: 0 | Status: SHIPPED | OUTPATIENT
Start: 2024-04-08 | End: 2024-04-15

## 2024-04-08 RX ORDER — FLUCONAZOLE 150 MG/1
150 TABLET ORAL ONCE
Qty: 1 TABLET | Refills: 0 | Status: SHIPPED | OUTPATIENT
Start: 2024-04-08 | End: 2024-04-08

## 2024-04-08 NOTE — PROGRESS NOTES
"Assessment/Plan:     Diagnoses and all orders for this visit:    Acute non-recurrent maxillary sinusitis  -     amoxicillin-clavulanate (AUGMENTIN) 875-125 mg per tablet; Take 1 tablet by mouth every 12 (twelve) hours for 7 days    Yeast infection  -     fluconazole (DIFLUCAN) 150 mg tablet; Take 1 tablet (150 mg total) by mouth once for 1 dose        #1 Acute non-recurrent maxillary sinusitis  Discussed with patient plan to treat with 7 day course of Augmentin  #2 Yeast infection  Discussed with patient that will prescribe the fluconazole due to frequent yeast infection after antibiotics  Patient instructed to call if no improvement in 72 hours or symptoms worsen    Subjective:      Patient ID: Lydia Patel is a 68 y.o. female.    68 y.o.female presenting with sinus pressure/pain for the past 3 days. Patient was seen in the office on 04/03/2024 for right ear pain due to impacted cerumen. Her ear was cleaned out and she was instructed to continue with her conservative therapy.      The following portions of the patient's history were reviewed and updated as appropriate: allergies, current medications, past family history, past medical history, past social history, past surgical history, and problem list.    Review of Systems   Constitutional:  Negative for chills, fatigue and fever.   HENT:  Positive for congestion, postnasal drip, sinus pressure and sinus pain. Negative for ear pain and sore throat.    Respiratory: Negative.     Cardiovascular: Negative.    Gastrointestinal: Negative.    Musculoskeletal: Negative.    Neurological: Negative.    Psychiatric/Behavioral: Negative.         Objective:    /70 (BP Location: Left arm, Patient Position: Sitting, Cuff Size: Adult)   Pulse 80   Temp (!) 97.3 °F (36.3 °C)   Ht 5' 4\" (1.626 m)   Wt 49.2 kg (108 lb 8 oz)   SpO2 98%   BMI 18.62 kg/m² (Reviewed)     Physical Exam  Vitals reviewed.   Constitutional:       General: She is not in acute distress.     " Appearance: She is well-developed and well-groomed. She is not ill-appearing.   HENT:      Head: Normocephalic and atraumatic.      Right Ear: Tympanic membrane, ear canal and external ear normal.      Left Ear: Tympanic membrane, ear canal and external ear normal.      Nose: Nasal tenderness, mucosal edema and congestion present.      Right Sinus: Maxillary sinus tenderness present.      Left Sinus: Maxillary sinus tenderness present.      Mouth/Throat:      Mouth: Mucous membranes are moist.      Pharynx: Oropharynx is clear.   Eyes:      General: Lids are normal.      Extraocular Movements: Extraocular movements intact.      Conjunctiva/sclera: Conjunctivae normal.      Pupils: Pupils are equal, round, and reactive to light.   Neck:      Trachea: Trachea and phonation normal.   Cardiovascular:      Rate and Rhythm: Normal rate and regular rhythm.      Heart sounds: Normal heart sounds.   Pulmonary:      Effort: Pulmonary effort is normal.      Breath sounds: Normal breath sounds.   Skin:     General: Skin is warm and dry.      Capillary Refill: Capillary refill takes less than 2 seconds.   Neurological:      General: No focal deficit present.      Mental Status: She is alert and oriented to person, place, and time.   Psychiatric:         Mood and Affect: Mood normal.         Behavior: Behavior normal. Behavior is cooperative.         Thought Content: Thought content normal.

## 2024-04-09 ENCOUNTER — PROCEDURE VISIT (OUTPATIENT)
Age: 69
End: 2024-04-09
Payer: COMMERCIAL

## 2024-04-09 VITALS
HEART RATE: 76 BPM | SYSTOLIC BLOOD PRESSURE: 130 MMHG | HEIGHT: 64 IN | DIASTOLIC BLOOD PRESSURE: 75 MMHG | WEIGHT: 108 LBS | BODY MASS INDEX: 18.44 KG/M2

## 2024-04-09 DIAGNOSIS — M99.03 SEGMENTAL DYSFUNCTION OF LUMBAR REGION: Primary | ICD-10-CM

## 2024-04-09 DIAGNOSIS — M47.816 LUMBAR SPONDYLOSIS: ICD-10-CM

## 2024-04-09 DIAGNOSIS — M99.04 SEGMENTAL DYSFUNCTION OF SACRAL REGION: ICD-10-CM

## 2024-04-09 DIAGNOSIS — M99.02 SEGMENTAL DYSFUNCTION OF THORACIC REGION: ICD-10-CM

## 2024-04-09 PROCEDURE — 98941 CHIROPRACT MANJ 3-4 REGIONS: CPT | Performed by: CHIROPRACTOR

## 2024-04-09 RX ORDER — FLUCONAZOLE 150 MG/1
TABLET ORAL
COMMUNITY
Start: 2024-04-08

## 2024-04-09 NOTE — PROGRESS NOTES
Initial date of service:    Diagnoses and all orders for this visit:    Segmental dysfunction of lumbar region    Lumbar spondylosis    Segmental dysfunction of sacral region    Segmental dysfunction of thoracic region    Other orders  -     fluconazole (DIFLUCAN) 150 mg tablet       ASSESSMENT:   Pt's symptoms and exam findings consistent with lumbar spondylosis with associated segmental dysfunction secondary to repetitive st/sp injury, exacerbated by postural/ergonomic stressors. Pt responded well to flexion biased stretches and manual mobilization of the affected spinal and myofascial tissues with increased ROM; trial of conservative tx recommended consisting of stretching, graded mobilization/manipulation of the affected spinal and myofascial jt dysfunction, postural/ergonomic education and take home stretches/exercises. If symptoms fail to improve with short trial of conservative care, appropriate imaging and referral will be coordinated.  -Improvements in pain and mm spasm in lower back and glutes post-trx    PROCEDURE CODES: 20687-[id number]    TREATMENT:  Fear avoidance behavior discussion; encouraged and reassured pt that natural course of condition is to improve over time with adherence to tx plan and home care strategies. Home care recommendations: avoid bed rest, walk (but avoid trails and uneven surfaces), gradual return to activity to tolerance (avoid anything that peripheralizes symptoms), call if symptoms peripheralize, worsen, or neurologic deficit progresses. Ther-ex: IASTM; discussed post procedure soreness and/or ecchymosis for up to 36 hrs, applied to affected mm hypertonicities; supine hamstring stretch, supine gluteal stretch, side laying QL stretch, single knee to chest stretch, hip flexor pin-and-stretch, alternating prone hip extension, glute bridge, transitional mvmt education, abdominal bracing; greater than 15 min spent performing above mentioned ther-ex to improve ROM/flexibility. Thoracic  mobilization/manipulation: prone P-A mob, supine A-P manip; Lumbar mobilization/manipulation: diversified side laying graded HVLA, flexion-traction; SIJ Manipulation/Mobilization: R/L SIJ HVLA - long axis distraction, dobbs drop table maneuver to affected SIJ    HPI:  Lydia Patel is a 68 y.o. female  Chief Complaint   Patient presents with   • Back Pain     Mid to lower lumbar pain score 2   Patient states intermittent dull pain     The patient presents to the office with lower back pain that started in November when picking up a heavy planter, the patient has images and course of PT which helped 66% of the pain but still has on spot that is an issue in the morning. Getting on cough and once finished coffee pain is better. The patient has no pain in to the extremities. Continues stretches and exercises from PT that ended 2 wks ago. She has had Chiropractic.   - The patient reports she has improvements in the pain, the patient mentions its still there.      Back Pain      Past Medical History:   Diagnosis Date   • Anxiety disorder    • Diabetes mellitus (HCC)    • Disease of thyroid gland    • GERD (gastroesophageal reflux disease)    • Hyperlipidemia    • Osteoporosis       Past Surgical History:   Procedure Laterality Date   •  SECTION     • HEMORRHOID SURGERY       The following portions of the patient's history were reviewed and updated as appropriate: allergies, past family history, past medical history, past social history, past surgical history, and problem list.  Review of Systems   Musculoskeletal:  Positive for back pain.     Physical Exam  Musculoskeletal:         General: Tenderness present.      Thoracic back: Spasms and tenderness present. Decreased range of motion.      Lumbar back: Spasms and tenderness present. No swelling, edema, deformity, signs of trauma or lacerations. Decreased range of motion.        Back:    Neurological:      Gait: Gait is intact.      Deep  Tendon Reflexes: Reflexes are normal and symmetric.       SOFT TISSUE ASSESSMENT Hypertonicity and tenderness palpated B T10-S1 erector spinae, hip flexor, glute med/min, QL, hamstring JOINT RESTRICTIONS: T10-S1 and L SIJ ORTHO: SI jt point tenderness: +; Iliana unremarkable for centralization/peripheralization; jerry's, iliac compression, thigh thrust elicit lbp in L SIJ; prone femoral nerve stretch neg for upper lumbar neural tension, elicits R/L SIJ stiffness; sitting root elicits no lbp on R/L; slump test elicits no neural tension R/L    Return in about 1 week (around 4/16/2024) for Recheck.

## 2024-04-16 ENCOUNTER — PROCEDURE VISIT (OUTPATIENT)
Age: 69
End: 2024-04-16
Payer: COMMERCIAL

## 2024-04-16 VITALS
HEIGHT: 64 IN | BODY MASS INDEX: 18.44 KG/M2 | SYSTOLIC BLOOD PRESSURE: 113 MMHG | WEIGHT: 108 LBS | HEART RATE: 69 BPM | DIASTOLIC BLOOD PRESSURE: 68 MMHG

## 2024-04-16 DIAGNOSIS — M47.816 LUMBAR SPONDYLOSIS: ICD-10-CM

## 2024-04-16 DIAGNOSIS — M99.03 SEGMENTAL DYSFUNCTION OF LUMBAR REGION: Primary | ICD-10-CM

## 2024-04-16 DIAGNOSIS — M99.04 SEGMENTAL DYSFUNCTION OF SACRAL REGION: ICD-10-CM

## 2024-04-16 DIAGNOSIS — M99.02 SEGMENTAL DYSFUNCTION OF THORACIC REGION: ICD-10-CM

## 2024-04-16 PROCEDURE — 98941 CHIROPRACT MANJ 3-4 REGIONS: CPT | Performed by: CHIROPRACTOR

## 2024-04-16 NOTE — PROGRESS NOTES
Initial date of service:    Diagnoses and all orders for this visit:    Segmental dysfunction of lumbar region    Lumbar spondylosis    Segmental dysfunction of sacral region    Segmental dysfunction of thoracic region       ASSESSMENT:   Pt's symptoms and exam findings consistent with lumbar spondylosis with associated segmental dysfunction secondary to repetitive st/sp injury, exacerbated by postural/ergonomic stressors. Pt responded well to flexion biased stretches and manual mobilization of the affected spinal and myofascial tissues with increased ROM; trial of conservative tx recommended consisting of stretching, graded mobilization/manipulation of the affected spinal and myofascial jt dysfunction, postural/ergonomic education and take home stretches/exercises. If symptoms fail to improve with short trial of conservative care, appropriate imaging and referral will be coordinated.  -Improvements in pain and mm spasm in lower back and glutes post-trx    PROCEDURE CODES: 38159-AJ    TREATMENT:  Fear avoidance behavior discussion; encouraged and reassured pt that natural course of condition is to improve over time with adherence to tx plan and home care strategies. Home care recommendations: avoid bed rest, walk (but avoid trails and uneven surfaces), gradual return to activity to tolerance (avoid anything that peripheralizes symptoms), call if symptoms peripheralize, worsen, or neurologic deficit progresses. Ther-ex: IASTM; discussed post procedure soreness and/or ecchymosis for up to 36 hrs, applied to affected mm hypertonicities; supine hamstring stretch, supine gluteal stretch, side laying QL stretch, single knee to chest stretch, hip flexor pin-and-stretch, alternating prone hip extension, glute bridge, transitional mvmt education, abdominal bracing; greater than 15 min spent performing above mentioned ther-ex to improve ROM/flexibility. Thoracic mobilization/manipulation: prone P-A mob, supine A-P manip;  Lumbar mobilization/manipulation: diversified side laying graded HVLA, flexion-traction; SIJ Manipulation/Mobilization: R/L SIJ HVLA - long axis distraction, dobbs drop table maneuver to affected SIJ    HPI:  Lydia Patel is a 68 y.o. female  Chief Complaint   Patient presents with   • Back Pain     Lower lumbar pain score 1   Patient states feeling better         The patient presents to the office with lower back pain that started in November when picking up a heavy planter, the patient has images and course of PT which helped 66% of the pain but still has on spot that is an issue in the morning. Getting on cough and once finished coffee pain is better. The patient has no pain in to the extremities. Continues stretches and exercises from PT that ended 2 wks ago. She has had Chiropractic.   - The patient reports she has improvements in the pain, the patient mentions its still there.    - The patient is feeling better overall and had two good mornings.     Back Pain      Past Medical History:   Diagnosis Date   • Anxiety disorder    • Diabetes mellitus (HCC)    • Disease of thyroid gland    • GERD (gastroesophageal reflux disease)    • Hyperlipidemia    • Osteoporosis       Past Surgical History:   Procedure Laterality Date   •  SECTION     • HEMORRHOID SURGERY       The following portions of the patient's history were reviewed and updated as appropriate: allergies, past family history, past medical history, past social history, past surgical history, and problem list.  Review of Systems   Musculoskeletal:  Positive for back pain.     Physical Exam  Musculoskeletal:         General: Tenderness present.      Thoracic back: Spasms and tenderness present. Decreased range of motion.      Lumbar back: Spasms and tenderness present. No swelling, edema, deformity, signs of trauma or lacerations. Decreased range of motion.        Back:    Neurological:      Gait: Gait is intact.      Deep  Tendon Reflexes: Reflexes are normal and symmetric.       SOFT TISSUE ASSESSMENT Hypertonicity and tenderness palpated B T10-S1 erector spinae, hip flexor, glute med/min, QL, hamstring JOINT RESTRICTIONS: T10-S1 and L SIJ ORTHO: SI jt point tenderness: +; Iliana unremarkable for centralization/peripheralization; jerry's, iliac compression, thigh thrust elicit lbp in L SIJ; prone femoral nerve stretch neg for upper lumbar neural tension, elicits R/L SIJ stiffness; sitting root elicits no lbp on R/L; slump test elicits no neural tension R/L    Return in about 1 week (around 4/23/2024) for Recheck.

## 2024-04-25 ENCOUNTER — PROCEDURE VISIT (OUTPATIENT)
Age: 69
End: 2024-04-25
Payer: COMMERCIAL

## 2024-04-25 VITALS
HEART RATE: 79 BPM | HEIGHT: 64 IN | DIASTOLIC BLOOD PRESSURE: 79 MMHG | BODY MASS INDEX: 18.44 KG/M2 | WEIGHT: 108 LBS | SYSTOLIC BLOOD PRESSURE: 138 MMHG

## 2024-04-25 DIAGNOSIS — M99.04 SEGMENTAL DYSFUNCTION OF SACRAL REGION: ICD-10-CM

## 2024-04-25 DIAGNOSIS — M99.03 SEGMENTAL DYSFUNCTION OF LUMBAR REGION: Primary | ICD-10-CM

## 2024-04-25 DIAGNOSIS — M47.816 LUMBAR SPONDYLOSIS: ICD-10-CM

## 2024-04-25 DIAGNOSIS — M99.02 SEGMENTAL DYSFUNCTION OF THORACIC REGION: ICD-10-CM

## 2024-04-25 PROCEDURE — 98941 CHIROPRACT MANJ 3-4 REGIONS: CPT | Performed by: CHIROPRACTOR

## 2024-04-25 NOTE — PROGRESS NOTES
Initial date of service: 3/14/24    Diagnoses and all orders for this visit:    Segmental dysfunction of lumbar region    Lumbar spondylosis    Segmental dysfunction of sacral region    Segmental dysfunction of thoracic region       ASSESSMENT:   Pt's symptoms and exam findings consistent with lumbar spondylosis with associated segmental dysfunction secondary to repetitive st/sp injury, exacerbated by postural/ergonomic stressors. Pt responded well to flexion biased stretches and manual mobilization of the affected spinal and myofascial tissues with increased ROM; trial of conservative tx recommended consisting of stretching, graded mobilization/manipulation of the affected spinal and myofascial jt dysfunction, postural/ergonomic education and take home stretches/exercises. If symptoms fail to improve with short trial of conservative care, appropriate imaging and referral will be coordinated.  -Improvements in pain and mm spasm in lower back and glutes post-trx    PROCEDURE CODES: 57234-[id number]    TREATMENT:  Fear avoidance behavior discussion; encouraged and reassured pt that natural course of condition is to improve over time with adherence to tx plan and home care strategies. Home care recommendations: avoid bed rest, walk (but avoid trails and uneven surfaces), gradual return to activity to tolerance (avoid anything that peripheralizes symptoms), call if symptoms peripheralize, worsen, or neurologic deficit progresses. Ther-ex: IASTM; discussed post procedure soreness and/or ecchymosis for up to 36 hrs, applied to affected mm hypertonicities; supine hamstring stretch, supine gluteal stretch, side laying QL stretch, single knee to chest stretch, hip flexor pin-and-stretch, alternating prone hip extension, glute bridge, transitional mvmt education, abdominal bracing; greater than 15 min spent performing above mentioned ther-ex to improve ROM/flexibility. Thoracic mobilization/manipulation: prone P-A mob, supine A-P  manip; Lumbar mobilization/manipulation: diversified side laying graded HVLA, flexion-traction; SIJ Manipulation/Mobilization: R/L SIJ HVLA - long axis distraction, dobbs drop table maneuver to affected SIJ    HPI:  Lydia Patel is a 68 y.o. female  Chief Complaint   Patient presents with   • Back Pain     Mid to lower lumbar pain score 1    Patient states feeling well.     The patient presents to the office with lower back pain that started in November when picking up a heavy planter, the patient has images and course of PT which helped 66% of the pain but still has on spot that is an issue in the morning. Getting on cough and once finished coffee pain is better. The patient has no pain in to the extremities. Continues stretches and exercises from PT that ended 2 wks ago. She has had Chiropractic.   - The patient reports she has improvements in the pain, the patient mentions its still there.    - The patient is feeling better overall and had two good mornings.   - The patient pain as last visit but mentions no zinger type pain.    Back Pain      Past Medical History:   Diagnosis Date   • Anxiety disorder    • Diabetes mellitus (HCC)    • Disease of thyroid gland    • GERD (gastroesophageal reflux disease)    • Hyperlipidemia    • Osteoporosis       Past Surgical History:   Procedure Laterality Date   •  SECTION     • HEMORRHOID SURGERY       The following portions of the patient's history were reviewed and updated as appropriate: allergies, past family history, past medical history, past social history, past surgical history, and problem list.  Review of Systems   Musculoskeletal:  Positive for back pain.     Physical Exam  Musculoskeletal:         General: Tenderness present.      Thoracic back: Spasms and tenderness present. Decreased range of motion.      Lumbar back: Spasms and tenderness present. No swelling, edema, deformity, signs of trauma or lacerations. Decreased  range of motion.        Back:    Neurological:      Gait: Gait is intact.      Deep Tendon Reflexes: Reflexes are normal and symmetric.       SOFT TISSUE ASSESSMENT Hypertonicity and tenderness palpated B T10-S1 erector spinae, hip flexor, glute med/min, QL, hamstring JOINT RESTRICTIONS: T10-S1 and L SIJ ORTHO: SI jt point tenderness: +; Iliana unremarkable for centralization/peripheralization; jerry's, iliac compression, thigh thrust elicit lbp in L SIJ; prone femoral nerve stretch neg for upper lumbar neural tension, elicits R/L SIJ stiffness; sitting root elicits no lbp on R/L; slump test elicits no neural tension R/L    Return in about 1 week (around 5/2/2024) for Recheck.

## 2024-04-30 LAB — HBA1C MFR BLD HPLC: 8.6 %

## 2024-05-02 ENCOUNTER — PROCEDURE VISIT (OUTPATIENT)
Age: 69
End: 2024-05-02
Payer: COMMERCIAL

## 2024-05-02 VITALS
WEIGHT: 108 LBS | HEIGHT: 64 IN | SYSTOLIC BLOOD PRESSURE: 113 MMHG | DIASTOLIC BLOOD PRESSURE: 60 MMHG | BODY MASS INDEX: 18.44 KG/M2 | HEART RATE: 80 BPM

## 2024-05-02 DIAGNOSIS — M99.03 SEGMENTAL DYSFUNCTION OF LUMBAR REGION: Primary | ICD-10-CM

## 2024-05-02 DIAGNOSIS — M99.02 SEGMENTAL DYSFUNCTION OF THORACIC REGION: ICD-10-CM

## 2024-05-02 DIAGNOSIS — M47.816 LUMBAR SPONDYLOSIS: ICD-10-CM

## 2024-05-02 DIAGNOSIS — M99.04 SEGMENTAL DYSFUNCTION OF SACRAL REGION: ICD-10-CM

## 2024-05-02 PROCEDURE — 98941 CHIROPRACT MANJ 3-4 REGIONS: CPT | Performed by: CHIROPRACTOR

## 2024-05-02 NOTE — PROGRESS NOTES
Initial date of service: 3/14/24    Diagnoses and all orders for this visit:    Segmental dysfunction of lumbar region    Lumbar spondylosis  -     Ambulatory Referral to Physical Therapy; Future    Segmental dysfunction of sacral region    Segmental dysfunction of thoracic region       ASSESSMENT:   Pt's symptoms and exam findings consistent with lumbar spondylosis with associated segmental dysfunction secondary to repetitive st/sp injury, exacerbated by postural/ergonomic stressors. Pt responded well to flexion biased stretches and manual mobilization of the affected spinal and myofascial tissues with increased ROM; trial of conservative tx recommended consisting of stretching, graded mobilization/manipulation of the affected spinal and myofascial jt dysfunction, postural/ergonomic education and take home stretches/exercises. If symptoms fail to improve with short trial of conservative care, appropriate imaging and referral will be coordinated.  -Improvements in pain and mm spasm in lower back and glutes post-trx, Back to PT just for refresher.    PROCEDURE CODES: [id number]    TREATMENT:  Fear avoidance behavior discussion; encouraged and reassured pt that natural course of condition is to improve over time with adherence to tx plan and home care strategies. Home care recommendations: avoid bed rest, walk (but avoid trails and uneven surfaces), gradual return to activity to tolerance (avoid anything that peripheralizes symptoms), call if symptoms peripheralize, worsen, or neurologic deficit progresses. Ther-ex: IASTM; discussed post procedure soreness and/or ecchymosis for up to 36 hrs, applied to affected mm hypertonicities; supine hamstring stretch, supine gluteal stretch, side laying QL stretch, single knee to chest stretch, hip flexor pin-and-stretch, alternating prone hip extension, glute bridge, transitional mvmt education, abdominal bracing; greater than 15 min spent performing above mentioned ther-ex to  improve ROM/flexibility. Thoracic mobilization/manipulation: prone P-A mob, supine A-P manip; Lumbar mobilization/manipulation: diversified side laying graded HVLA, flexion-traction; SIJ Manipulation/Mobilization: R/L SIJ HVLA - long axis distraction, dobbs drop table maneuver to affected SIJ    HPI:  Lydia Patel is a 68 y.o. female  Chief Complaint   Patient presents with   • Back Pain     Low back left side about a 1 tender      The patient presents to the office with lower back pain that started in November when picking up a heavy planter, the patient has images and course of PT which helped 66% of the pain but still has on spot that is an issue in the morning. Getting on cough and once finished coffee pain is better. The patient has no pain in to the extremities. Continues stretches and exercises from PT that ended 2 wks ago. She has had Chiropractic.   - The patient reports she has improvements in the pain, the patient mentions its still there.    - The patient is feeling better overall and had two good mornings.   - The patient pain as last visit but mentions no zinger type pain.  - The patient is feeling the same.     Back Pain      Past Medical History:   Diagnosis Date   • Anxiety disorder    • Diabetes mellitus (HCC)    • Disease of thyroid gland    • GERD (gastroesophageal reflux disease)    • Hyperlipidemia    • Osteoporosis       Past Surgical History:   Procedure Laterality Date   •  SECTION     • HEMORRHOID SURGERY       The following portions of the patient's history were reviewed and updated as appropriate: allergies, past family history, past medical history, past social history, past surgical history, and problem list.  Review of Systems   Musculoskeletal:  Positive for back pain.     Physical Exam  Musculoskeletal:         General: Tenderness present.      Thoracic back: Spasms and tenderness present. Decreased range of motion.      Lumbar back: Spasms  and tenderness present. No swelling, edema, deformity, signs of trauma or lacerations. Decreased range of motion.        Back:    Neurological:      Gait: Gait is intact.      Deep Tendon Reflexes: Reflexes are normal and symmetric.       SOFT TISSUE ASSESSMENT Hypertonicity and tenderness palpated B T10-S1 erector spinae, hip flexor, glute med/min, QL, hamstring JOINT RESTRICTIONS: T10-S1 and L SIJ ORTHO: SI jt point tenderness: +; Iliana unremarkable for centralization/peripheralization; jerry's, iliac compression, thigh thrust elicit lbp in L SIJ; prone femoral nerve stretch neg for upper lumbar neural tension, elicits R/L SIJ stiffness; sitting root elicits no lbp on R/L; slump test elicits no neural tension R/L    Return in about 1 week (around 5/9/2024) for Recheck.

## 2024-05-06 ENCOUNTER — OFFICE VISIT (OUTPATIENT)
Dept: FAMILY MEDICINE CLINIC | Facility: CLINIC | Age: 69
End: 2024-05-06
Payer: COMMERCIAL

## 2024-05-06 VITALS
BODY MASS INDEX: 18.1 KG/M2 | WEIGHT: 106 LBS | TEMPERATURE: 98 F | HEART RATE: 80 BPM | OXYGEN SATURATION: 98 % | HEIGHT: 64 IN | SYSTOLIC BLOOD PRESSURE: 108 MMHG | DIASTOLIC BLOOD PRESSURE: 68 MMHG

## 2024-05-06 DIAGNOSIS — H92.03 OTALGIA OF BOTH EARS: Primary | ICD-10-CM

## 2024-05-06 PROCEDURE — G2211 COMPLEX E/M VISIT ADD ON: HCPCS | Performed by: NURSE PRACTITIONER

## 2024-05-06 PROCEDURE — 99214 OFFICE O/P EST MOD 30 MIN: CPT | Performed by: NURSE PRACTITIONER

## 2024-05-06 RX ORDER — PREDNISONE 20 MG/1
TABLET ORAL
Qty: 26 TABLET | Refills: 0 | Status: SHIPPED | OUTPATIENT
Start: 2024-05-06

## 2024-05-06 RX ORDER — AMOXICILLIN 500 MG/1
500 TABLET, FILM COATED ORAL 2 TIMES DAILY
Qty: 14 TABLET | Refills: 0 | Status: SHIPPED | OUTPATIENT
Start: 2024-05-06 | End: 2024-05-13

## 2024-05-06 NOTE — PROGRESS NOTES
"Assessment/Plan:     Diagnoses and all orders for this visit:    Otalgia of both ears  -     amoxicillin (AMOXIL) 500 MG tablet; Take 1 tablet (500 mg total) by mouth 2 (two) times a day for 7 days  -     predniSONE 20 mg tablet; Take 1 tab daily for 3 days        Discussed with patient plan to treat with a 7 day course o amoxicillin and a three day course of prednsione  Patient instructed to call if no improvement in 72 hours or symptoms worsen    Subjective:      Patient ID: Lydia Patel is a 68 y.o. female.    68 y.o.female presenting with a feeling of ear fullness after cerumen removal on 04/08/2024. She reports that the right ear is worse than the left and is intermittent for the past week. She feels like there is a bug stuck in her ear. She denies fever, chills or generalized body aches.       The following portions of the patient's history were reviewed and updated as appropriate: allergies, current medications, past family history, past medical history, past social history, past surgical history, and problem list.    Review of Systems   Constitutional:  Negative for chills, fatigue and fever.   HENT:  Positive for ear pain (fulllness). Negative for congestion, postnasal drip, rhinorrhea, sinus pressure, sinus pain and sore throat.    Respiratory: Negative.     Cardiovascular: Negative.    Gastrointestinal: Negative.    Musculoskeletal: Negative.    Neurological: Negative.    Psychiatric/Behavioral: Negative.         Objective:    /68 (BP Location: Left arm, Patient Position: Sitting, Cuff Size: Adult)   Pulse 80   Temp 98 °F (36.7 °C)   Ht 5' 4\" (1.626 m)   Wt 48.1 kg (106 lb)   SpO2 98%   BMI 18.19 kg/m² (Reviewed)     Physical Exam  Vitals reviewed.   Constitutional:       General: She is not in acute distress.     Appearance: She is well-developed and well-groomed. She is not ill-appearing.   HENT:      Head: Normocephalic and atraumatic.      Right Ear: Ear canal and external ear normal. " There is no impacted cerumen. Tympanic membrane is bulging.      Left Ear: Ear canal and external ear normal. There is no impacted cerumen. Tympanic membrane is bulging.      Nose: Nose normal.      Mouth/Throat:      Mouth: Mucous membranes are moist.      Pharynx: Oropharynx is clear.   Eyes:      General: Lids are normal.      Extraocular Movements: Extraocular movements intact.      Conjunctiva/sclera: Conjunctivae normal.      Pupils: Pupils are equal, round, and reactive to light.   Neck:      Trachea: Trachea and phonation normal.   Cardiovascular:      Rate and Rhythm: Normal rate and regular rhythm.   Pulmonary:      Effort: Pulmonary effort is normal.   Skin:     General: Skin is warm and dry.      Capillary Refill: Capillary refill takes less than 2 seconds.   Neurological:      General: No focal deficit present.      Mental Status: She is alert and oriented to person, place, and time.   Psychiatric:         Mood and Affect: Mood normal.         Behavior: Behavior normal. Behavior is cooperative.

## 2024-05-09 ENCOUNTER — EVALUATION (OUTPATIENT)
Dept: PHYSICAL THERAPY | Facility: CLINIC | Age: 69
End: 2024-05-09
Payer: COMMERCIAL

## 2024-05-09 ENCOUNTER — PROCEDURE VISIT (OUTPATIENT)
Age: 69
End: 2024-05-09
Payer: COMMERCIAL

## 2024-05-09 VITALS
BODY MASS INDEX: 18.1 KG/M2 | DIASTOLIC BLOOD PRESSURE: 61 MMHG | SYSTOLIC BLOOD PRESSURE: 115 MMHG | HEART RATE: 67 BPM | HEIGHT: 64 IN | WEIGHT: 106 LBS

## 2024-05-09 DIAGNOSIS — M99.02 SEGMENTAL DYSFUNCTION OF THORACIC REGION: ICD-10-CM

## 2024-05-09 DIAGNOSIS — M47.816 LUMBAR SPONDYLOSIS: ICD-10-CM

## 2024-05-09 DIAGNOSIS — M99.04 SEGMENTAL DYSFUNCTION OF SACRAL REGION: ICD-10-CM

## 2024-05-09 DIAGNOSIS — M99.03 SEGMENTAL DYSFUNCTION OF LUMBAR REGION: Primary | ICD-10-CM

## 2024-05-09 PROCEDURE — 97161 PT EVAL LOW COMPLEX 20 MIN: CPT

## 2024-05-09 PROCEDURE — 97530 THERAPEUTIC ACTIVITIES: CPT

## 2024-05-09 PROCEDURE — 98941 CHIROPRACT MANJ 3-4 REGIONS: CPT | Performed by: CHIROPRACTOR

## 2024-05-09 NOTE — PROGRESS NOTES
PT Evaluation     Today's date: 2024  Patient name: Lydia Patel  : 1955  MRN: [id number]  Referring provider: Florecita Murguia DC  Dx:   Encounter Diagnosis     ICD-10-CM    1. Lumbar spondylosis  M47.816 Ambulatory Referral to Physical Therapy          Start Time: 1620  Stop Time: 1656  Total time in clinic (min): 36 minutes    Assessment  Assessment details: Lydia Patel is a 68 y.o. female who presents to PT looking for an updated HEP and review how to do some of her previous exercises . Patient presents with decreased strength and lacking HEP. Due to these impairments, Patient has difficulty performing home exercise program. Patient would benefit from skilled physical therapy to address the impairments, improve their level of function, and to improve their overall quality of life. Reviewed HEP, involved anatomy, physio as well as POC with verbalized understanding and pt is in agreement with above. Pt will start with fitness at [facility] and follow up as needed.  Impairments: impaired physical strength and lacks appropriate home exercise program    Goals  Short Term Goals: to be achieved by 4 weeks  1) Patient to be independent with basic HEP  3) Pt will feel confident in self workouts and look to transition from [facility] to [employer] when comfortable  4) Increase LE strength by 1/2 MMT grade in all deficient planes    Long Term Goals: to be achieved by discharge  1) FOTO equal to or greater than projected goal.  2) Patient to be independent with comprehensive HEP      Plan  Plan details: Follow up as needed   Patient would benefit from: skilled physical therapy  Planned therapy interventions: abdominal trunk stabilization, ADL training, balance, body mechanics training, home exercise program, functional ROM exercises, flexibility, therapeutic exercise, therapeutic activities, stretching, strengthening, joint mobilization, manual therapy, neuromuscular re-education, patient education and  "postural training  Frequency: 1x week  Duration in visits: 3  Duration in weeks: 4  Treatment plan discussed with: patient      Subjective Evaluation    History of Present Illness  Mechanism of injury: History: Pt presents in need of a \"tune up\" for her HEP. Pt has been attending chiro through [facility] and was advised to come in for a tune up on her exercises. Pt is walking on the TM and is working on the leg press. She's also doing steamboats at home with TB resistance. She's doing L/S ext.     Patient Goals  Patient goal: independent in HEP  Pain  No pain reported      Diagnostic Tests  No diagnostic tests performed  Treatments  Current treatment: physical therapy      Objective     Static Posture     Comments  See exercise details             Precautions: standard      Manuals 5/9                                                                Neuro Re-Ed                                                                                                        Ther Ex             Leg press             LAQ             HS curls             HS S                                                                 Ther Activity             Pt edu: gym vs [employer], HEP, goals, fitness at [facility], PF transition RE                         Gait Training                                       Modalities                                            "

## 2024-05-09 NOTE — PROGRESS NOTES
Initial date of service: 3/14/24    Diagnoses and all orders for this visit:    Segmental dysfunction of lumbar region    Lumbar spondylosis    Segmental dysfunction of sacral region    Segmental dysfunction of thoracic region       ASSESSMENT:   Pt's symptoms and exam findings consistent with lumbar spondylosis with associated segmental dysfunction secondary to repetitive st/sp injury, exacerbated by postural/ergonomic stressors. Pt responded well to flexion biased stretches and manual mobilization of the affected spinal and myofascial tissues with increased ROM; trial of conservative tx recommended consisting of stretching, graded mobilization/manipulation of the affected spinal and myofascial jt dysfunction, postural/ergonomic education and take home stretches/exercises. If symptoms fail to improve with short trial of conservative care, appropriate imaging and referral will be coordinated.  -Improvements in pain and mm spasm in lower back and glutes post-trx, Back to PT just for refresher.    PROCEDURE CODES: 51404-LY    TREATMENT:  Fear avoidance behavior discussion; encouraged and reassured pt that natural course of condition is to improve over time with adherence to tx plan and home care strategies. Home care recommendations: avoid bed rest, walk (but avoid trails and uneven surfaces), gradual return to activity to tolerance (avoid anything that peripheralizes symptoms), call if symptoms peripheralize, worsen, or neurologic deficit progresses. Ther-ex: IASTM; discussed post procedure soreness and/or ecchymosis for up to 36 hrs, applied to affected mm hypertonicities; supine hamstring stretch, supine gluteal stretch, side laying QL stretch, single knee to chest stretch, hip flexor pin-and-stretch, alternating prone hip extension, glute bridge, transitional mvmt education, abdominal bracing; greater than 15 min spent performing above mentioned ther-ex to improve ROM/flexibility. Thoracic  mobilization/manipulation: prone P-A mob, supine A-P manip; Lumbar mobilization/manipulation: diversified side laying graded HVLA, flexion-traction; SIJ Manipulation/Mobilization: R/L SIJ HVLA - long axis distraction, dobbs drop table maneuver to affected SIJ    HPI:  Lydia Patel is a 68 y.o. female  Chief Complaint   Patient presents with   • Back Pain     Lower lumbar pain score 0-1     Patient states feeling good      The patient presents to the office with lower back pain that started in November when picking up a heavy planter, the patient has images and course of PT which helped 66% of the pain but still has on spot that is an issue in the morning. Getting on cough and once finished coffee pain is better. The patient has no pain in to the extremities. Continues stretches and exercises from PT that ended 2 wks ago. She has had Chiropractic.   - The patient reports she has improvements in the pain, the patient mentions its still there.    - The patient is feeling better overall and had two good mornings.   - The patient pain as last visit but mentions no zinger type pain.  - The patient is feeling the same.   - The patient is feeling a little better overall, doing well with exercises with the hips.    Back Pain      Past Medical History:   Diagnosis Date   • Anxiety disorder    • Diabetes mellitus (HCC)    • Disease of thyroid gland    • GERD (gastroesophageal reflux disease)    • Hyperlipidemia    • Osteoporosis       Past Surgical History:   Procedure Laterality Date   •  SECTION     • HEMORRHOID SURGERY       The following portions of the patient's history were reviewed and updated as appropriate: allergies, past family history, past medical history, past social history, past surgical history, and problem list.  Review of Systems   Musculoskeletal:  Positive for back pain.     Physical Exam  Musculoskeletal:         General: Tenderness present.      Thoracic back:  Spasms and tenderness present. Decreased range of motion.      Lumbar back: Spasms and tenderness present. No swelling, edema, deformity, signs of trauma or lacerations. Decreased range of motion.        Back:    Neurological:      Gait: Gait is intact.      Deep Tendon Reflexes: Reflexes are normal and symmetric.       SOFT TISSUE ASSESSMENT Hypertonicity and tenderness palpated B T10-S1 erector spinae, hip flexor, glute med/min, QL, hamstring JOINT RESTRICTIONS: T10-S1 and L SIJ ORTHO: SI jt point tenderness: +; Iliana unremarkable for centralization/peripheralization; jerry's, iliac compression, thigh thrust elicit lbp in L SIJ; prone femoral nerve stretch neg for upper lumbar neural tension, elicits R/L SIJ stiffness; sitting root elicits no lbp on R/L; slump test elicits no neural tension R/L    Return in about 1 week (around 5/16/2024) for Recheck.

## 2024-06-03 ENCOUNTER — APPOINTMENT (OUTPATIENT)
Dept: PHYSICAL THERAPY | Facility: CLINIC | Age: 69
End: 2024-06-03
Payer: COMMERCIAL

## 2024-06-04 ENCOUNTER — PROCEDURE VISIT (OUTPATIENT)
Age: 69
End: 2024-06-04
Payer: COMMERCIAL

## 2024-06-04 VITALS
HEART RATE: 77 BPM | DIASTOLIC BLOOD PRESSURE: 77 MMHG | SYSTOLIC BLOOD PRESSURE: 126 MMHG | WEIGHT: 106 LBS | HEIGHT: 64 IN | BODY MASS INDEX: 18.1 KG/M2

## 2024-06-04 DIAGNOSIS — M99.02 SEGMENTAL DYSFUNCTION OF THORACIC REGION: ICD-10-CM

## 2024-06-04 DIAGNOSIS — M47.816 LUMBAR SPONDYLOSIS: ICD-10-CM

## 2024-06-04 DIAGNOSIS — M99.03 SEGMENTAL DYSFUNCTION OF LUMBAR REGION: ICD-10-CM

## 2024-06-04 DIAGNOSIS — M99.04 SEGMENTAL DYSFUNCTION OF SACRAL REGION: Primary | ICD-10-CM

## 2024-06-04 PROCEDURE — 98941 CHIROPRACT MANJ 3-4 REGIONS: CPT | Performed by: CHIROPRACTOR

## 2024-06-04 NOTE — PROGRESS NOTES
Initial date of service: 3/14/24    Diagnoses and all orders for this visit:    Segmental dysfunction of sacral region    Segmental dysfunction of lumbar region    Lumbar spondylosis    Segmental dysfunction of thoracic region       ASSESSMENT:   Pt's symptoms and exam findings consistent with lumbar spondylosis with associated segmental dysfunction secondary to repetitive st/sp injury, exacerbated by postural/ergonomic stressors. Pt responded well to flexion biased stretches and manual mobilization of the affected spinal and myofascial tissues with increased ROM; trial of conservative tx recommended consisting of stretching, graded mobilization/manipulation of the affected spinal and myofascial jt dysfunction, postural/ergonomic education and take home stretches/exercises. If symptoms fail to improve with short trial of conservative care, appropriate imaging and referral will be coordinated.  -Improvements in pain and mm spasm in lower back and glutes post-trx, Back to PT just for refresher.  6/4- The patient is feeling improvements with treatment.     PROCEDURE CODES: 48860-OJ    TREATMENT:  Fear avoidance behavior discussion; encouraged and reassured pt that natural course of condition is to improve over time with adherence to tx plan and home care strategies. Home care recommendations: avoid bed rest, walk (but avoid trails and uneven surfaces), gradual return to activity to tolerance (avoid anything that peripheralizes symptoms), call if symptoms peripheralize, worsen, or neurologic deficit progresses. Ther-ex: IASTM; discussed post procedure soreness and/or ecchymosis for up to 36 hrs, applied to affected mm hypertonicities; supine hamstring stretch, supine gluteal stretch, side laying QL stretch, single knee to chest stretch, hip flexor pin-and-stretch, alternating prone hip extension, glute bridge, transitional mvmt education, abdominal bracing; greater than 15 min spent performing above mentioned ther-ex to  improve ROM/flexibility. Thoracic mobilization/manipulation: prone P-A mob, supine A-P manip; Lumbar mobilization/manipulation: diversified side laying graded HVLA, flexion-traction; SIJ Manipulation/Mobilization: R/L SIJ HVLA - long axis distraction, dobbs drop table maneuver to affected SIJ    HPI:  Lydia Patel is a 68 y.o. female  Chief Complaint   Patient presents with   • Back Pain     Lower lumbar pain that is intermittent  dull pain. Pain score 0     The patient presents to the office with lower back pain that started in November when picking up a heavy planter, the patient has images and course of PT which helped 66% of the pain but still has on spot that is an issue in the morning. Getting on cough and once finished coffee pain is better. The patient has no pain in to the extremities. Continues stretches and exercises from PT that ended 2 wks ago. She has had Chiropractic.   - The patient reports she has improvements in the pain, the patient mentions its still there.    - The patient is feeling better overall and had two good mornings.   - The patient pain as last visit but mentions no zinger type pain.  /- The patient is feeling the same.   59- The patient is feeling a little better overall, doing well with exercises with the hips.  6/4- The ps is feeling a little better overall 0/10    Back Pain      Past Medical History:   Diagnosis Date   • Anxiety disorder    • Diabetes mellitus (HCC)    • Disease of thyroid gland    • GERD (gastroesophageal reflux disease)    • Hyperlipidemia    • Osteoporosis       Past Surgical History:   Procedure Laterality Date   •  SECTION     • HEMORRHOID SURGERY       The following portions of the patient's history were reviewed and updated as appropriate: allergies, past family history, past medical history, past social history, past surgical history, and problem list.  Review of Systems   Musculoskeletal:  Positive for back pain.      Physical Exam  Musculoskeletal:         General: Tenderness present.      Thoracic back: Spasms and tenderness present. Decreased range of motion.      Lumbar back: Spasms and tenderness present. No swelling, edema, deformity, signs of trauma or lacerations. Decreased range of motion.        Back:    Neurological:      Gait: Gait is intact.      Deep Tendon Reflexes: Reflexes are normal and symmetric.       SOFT TISSUE ASSESSMENT Hypertonicity and tenderness palpated B T10-S1 erector spinae, hip flexor, glute med/min, QL, hamstring JOINT RESTRICTIONS: T10-S1 and L SIJ ORTHO: SI jt point tenderness: +; Iliana unremarkable for centralization/peripheralization; jerry's, iliac compression, thigh thrust elicit lbp in L SIJ; prone femoral nerve stretch neg for upper lumbar neural tension, elicits R/L SIJ stiffness; sitting root elicits no lbp on R/L; slump test elicits no neural tension R/L    Return in about 1 month (around 7/4/2024) for Recheck.

## 2024-06-06 ENCOUNTER — OFFICE VISIT (OUTPATIENT)
Dept: PHYSICAL THERAPY | Facility: CLINIC | Age: 69
End: 2024-06-06
Payer: COMMERCIAL

## 2024-06-06 DIAGNOSIS — M47.816 LUMBAR SPONDYLOSIS: Primary | ICD-10-CM

## 2024-06-06 PROCEDURE — 97112 NEUROMUSCULAR REEDUCATION: CPT

## 2024-06-06 PROCEDURE — 97110 THERAPEUTIC EXERCISES: CPT

## 2024-06-06 NOTE — PROGRESS NOTES
Daily Note     Today's date: 2024  Patient name: Lydia Patel  : 1955  MRN: [id number]  Referring provider: Florecita Murguia DC  Dx:   Encounter Diagnosis     ICD-10-CM    1. Lumbar spondylosis  M47.816           Start Time: 0800  Stop Time: 0835  Total time in clinic (min): 35 minutes    Subjective: Pt arrives with some set up questions on the machines when she is working out and would like to review her program.      Objective: See treatment diary below      Assessment: Tolerated treatment well. Patient  will continue with fitness program I and progressing as able.      Plan:  continue with fitness program.     Precautions: standard    1:1 800-823  Manuals                                                                Neuro Re-Ed             rows  Blk TB 10x; 10# 10x           pulldowns  Blk TB 10x; 10# 10x                                                                            Ther Ex             Leg press  35# 20x           LAQ SL  11# 10x           HS curls SL  11# 10x           HS S                                                                 Ther Activity             Pt edu: gym vs [employer], HEP, goals, fitness at [facility], PF transition RE Fitness exercise set up & progression- RE                        Gait Training                                       Modalities

## 2024-07-09 ENCOUNTER — TELEPHONE (OUTPATIENT)
Age: 69
End: 2024-07-09

## 2024-07-09 ENCOUNTER — PROCEDURE VISIT (OUTPATIENT)
Age: 69
End: 2024-07-09
Payer: COMMERCIAL

## 2024-07-09 VITALS
BODY MASS INDEX: 18.1 KG/M2 | SYSTOLIC BLOOD PRESSURE: 124 MMHG | HEART RATE: 70 BPM | WEIGHT: 106 LBS | HEIGHT: 64 IN | DIASTOLIC BLOOD PRESSURE: 73 MMHG

## 2024-07-09 DIAGNOSIS — M47.816 LUMBAR SPONDYLOSIS: ICD-10-CM

## 2024-07-09 DIAGNOSIS — M99.02 SEGMENTAL DYSFUNCTION OF THORACIC REGION: ICD-10-CM

## 2024-07-09 DIAGNOSIS — M99.04 SEGMENTAL DYSFUNCTION OF SACRAL REGION: Primary | ICD-10-CM

## 2024-07-09 DIAGNOSIS — M99.03 SEGMENTAL DYSFUNCTION OF LUMBAR REGION: ICD-10-CM

## 2024-07-09 PROCEDURE — 98941 CHIROPRACT MANJ 3-4 REGIONS: CPT | Performed by: CHIROPRACTOR

## 2024-07-09 NOTE — TELEPHONE ENCOUNTER
Pt has moved to [place] from [place] and is looking to change her dermatology dr. I did give her the number of [facility] but she asked for RJ to call her back with any recommendations for a dermatologist  In [place]. Please call pt back to advise

## 2024-07-09 NOTE — PROGRESS NOTES
Initial date of service: 3/14/24    Diagnoses and all orders for this visit:    Segmental dysfunction of sacral region    Segmental dysfunction of lumbar region    Segmental dysfunction of thoracic region    Lumbar spondylosis       ASSESSMENT:   Pt's symptoms and exam findings consistent with lumbar spondylosis with associated segmental dysfunction secondary to repetitive st/sp injury, exacerbated by postural/ergonomic stressors. Pt responded well to flexion biased stretches and manual mobilization of the affected spinal and myofascial tissues with increased ROM; trial of conservative tx recommended consisting of stretching, graded mobilization/manipulation of the affected spinal and myofascial jt dysfunction, postural/ergonomic education and take home stretches/exercises. If symptoms fail to improve with short trial of conservative care, appropriate imaging and referral will be coordinated.  -Improvements in pain and mm spasm in lower back and glutes post-trx, Back to PT just for refresher.  7/9- The patient is feeling improvements with treatment.     PROCEDURE CODES: 55301-GU    TREATMENT:  Fear avoidance behavior discussion; encouraged and reassured pt that natural course of condition is to improve over time with adherence to tx plan and home care strategies. Home care recommendations: avoid bed rest, walk (but avoid trails and uneven surfaces), gradual return to activity to tolerance (avoid anything that peripheralizes symptoms), call if symptoms peripheralize, worsen, or neurologic deficit progresses. Ther-ex: IASTM; discussed post procedure soreness and/or ecchymosis for up to 36 hrs, applied to affected mm hypertonicities; supine hamstring stretch, supine gluteal stretch, side laying QL stretch, single knee to chest stretch, hip flexor pin-and-stretch, alternating prone hip extension, glute bridge, transitional mvmt education, abdominal bracing; greater than 15 min spent performing above mentioned ther-ex to  improve ROM/flexibility. Thoracic mobilization/manipulation: prone P-A mob, supine A-P manip; Lumbar mobilization/manipulation: diversified side laying graded HVLA, flexion-traction; SIJ Manipulation/Mobilization: R/L SIJ HVLA - long axis distraction, dobbs drop table maneuver to affected SIJ    HPI:  Lydia Patel is a 68 y.o. female  Chief Complaint   Patient presents with   • Back Pain     Low back no pain currently but some intermittent      The patient presents to the office with lower back pain that started in November when picking up a heavy planter, the patient has images and course of PT which helped 66% of the pain but still has on spot that is an issue in the morning. Getting on cough and once finished coffee pain is better. The patient has no pain in to the extremities. Continues stretches and exercises from PT that ended 2 wks ago. She has had Chiropractic.   - The patient reports she has improvements in the pain, the patient mentions its still there.    - The patient is feeling better overall and had two good mornings.   - The patient pain as last visit but mentions no zinger type pain.  - The patient is feeling the same.   5- The patient is feeling a little better overall, doing well with exercises with the hips.  6/4- The ps is feeling a little better overall 010  7/9-The patient is feeling much better    Back Pain      Past Medical History:   Diagnosis Date   • Anxiety disorder    • Diabetes mellitus (HCC)    • Disease of thyroid gland    • GERD (gastroesophageal reflux disease)    • Hyperlipidemia    • Osteoporosis       Past Surgical History:   Procedure Laterality Date   •  SECTION     • HEMORRHOID SURGERY       The following portions of the patient's history were reviewed and updated as appropriate: allergies, past family history, past medical history, past social history, past surgical history, and problem list.  Review of Systems   Musculoskeletal:   Positive for back pain.     Physical Exam  Musculoskeletal:         General: Tenderness present.      Thoracic back: Spasms and tenderness present. Decreased range of motion.      Lumbar back: Spasms and tenderness present. No swelling, edema, deformity, signs of trauma or lacerations. Decreased range of motion.        Back:    Neurological:      Gait: Gait is intact.      Deep Tendon Reflexes: Reflexes are normal and symmetric.       SOFT TISSUE ASSESSMENT Hypertonicity and tenderness palpated B T10-S1 erector spinae, hip flexor, glute med/min, QL, hamstring JOINT RESTRICTIONS: T10-S1 and L SIJ ORTHO: SI jt point tenderness: +; Iliana unremarkable for centralization/peripheralization; jerry's, iliac compression, thigh thrust elicit lbp in L SIJ; prone femoral nerve stretch neg for upper lumbar neural tension, elicits R/L SIJ stiffness; sitting root elicits no lbp on R/L; slump test elicits no neural tension R/L    Return in about 1 week (around 7/16/2024) for Recheck.

## 2024-08-06 ENCOUNTER — PROCEDURE VISIT (OUTPATIENT)
Age: 69
End: 2024-08-06
Payer: COMMERCIAL

## 2024-08-06 ENCOUNTER — OFFICE VISIT (OUTPATIENT)
Dept: PHYSICAL THERAPY | Facility: CLINIC | Age: 69
End: 2024-08-06
Payer: COMMERCIAL

## 2024-08-06 VITALS
HEART RATE: 77 BPM | RESPIRATION RATE: 15 BRPM | WEIGHT: 106 LBS | SYSTOLIC BLOOD PRESSURE: 113 MMHG | BODY MASS INDEX: 18.1 KG/M2 | HEIGHT: 64 IN | DIASTOLIC BLOOD PRESSURE: 61 MMHG

## 2024-08-06 DIAGNOSIS — M99.04 SEGMENTAL DYSFUNCTION OF SACRAL REGION: Primary | ICD-10-CM

## 2024-08-06 DIAGNOSIS — M99.02 SEGMENTAL DYSFUNCTION OF THORACIC REGION: ICD-10-CM

## 2024-08-06 DIAGNOSIS — M47.816 LUMBAR SPONDYLOSIS: Primary | ICD-10-CM

## 2024-08-06 DIAGNOSIS — M47.816 LUMBAR SPONDYLOSIS: ICD-10-CM

## 2024-08-06 DIAGNOSIS — M99.03 SEGMENTAL DYSFUNCTION OF LUMBAR REGION: ICD-10-CM

## 2024-08-06 PROCEDURE — 97112 NEUROMUSCULAR REEDUCATION: CPT

## 2024-08-06 PROCEDURE — 98941 CHIROPRACT MANJ 3-4 REGIONS: CPT | Performed by: CHIROPRACTOR

## 2024-08-06 PROCEDURE — 97110 THERAPEUTIC EXERCISES: CPT

## 2024-08-06 NOTE — PROGRESS NOTES
Initial date of service: 3/14/24    Diagnoses and all orders for this visit:    Segmental dysfunction of sacral region    Segmental dysfunction of lumbar region    Segmental dysfunction of thoracic region    Lumbar spondylosis       ASSESSMENT:   Pt's symptoms and exam findings consistent with lumbar spondylosis with associated segmental dysfunction secondary to repetitive st/sp injury, exacerbated by postural/ergonomic stressors. Pt responded well to flexion biased stretches and manual mobilization of the affected spinal and myofascial tissues with increased ROM; trial of conservative tx recommended consisting of stretching, graded mobilization/manipulation of the affected spinal and myofascial jt dysfunction, postural/ergonomic education and take home stretches/exercises. If symptoms fail to improve with short trial of conservative care, appropriate imaging and referral will be coordinated.  -Improvements in pain and mm spasm in lower back and glutes post-trx, Back to PT just for refresher.  8/6- The pt had good progress. The patient is feeling improvements with treatment.     PROCEDURE CODES: 61590-CR    TREATMENT:  Fear avoidance behavior discussion; encouraged and reassured pt that natural course of condition is to improve over time with adherence to tx plan and home care strategies. Home care recommendations: avoid bed rest, walk (but avoid trails and uneven surfaces), gradual return to activity to tolerance (avoid anything that peripheralizes symptoms), call if symptoms peripheralize, worsen, or neurologic deficit progresses. Ther-ex: IASTM; discussed post procedure soreness and/or ecchymosis for up to 36 hrs, applied to affected mm hypertonicities; supine hamstring stretch, supine gluteal stretch, side laying QL stretch, single knee to chest stretch, hip flexor pin-and-stretch, alternating prone hip extension, glute bridge, transitional mvmt education, abdominal bracing; greater than 15 min spent performing  above mentioned ther-ex to improve ROM/flexibility. Thoracic mobilization/manipulation: prone P-A mob, supine A-P manip; Lumbar mobilization/manipulation: diversified side laying graded HVLA, flexion-traction; SIJ Manipulation/Mobilization: R/L SIJ HVLA - long axis distraction, dobbs drop table maneuver to affected SIJ    HPI:  Lydia Patel is a 68 y.o. female  Chief Complaint   Patient presents with   • Back Pain     Patient doing better since last visit  Patient denies any pain at todays visit     The patient presents to the office with lower back pain that started in November when picking up a heavy planter, the patient has images and course of PT which helped 66% of the pain but still has on spot that is an issue in the morning. Getting on cough and once finished coffee pain is better. The patient has no pain in to the extremities. Continues stretches and exercises from PT that ended 2 wks ago. She has had Chiropractic.   - The patient reports she has improvements in the pain, the patient mentions its still there.    - The patient is feeling better overall and had two good mornings.   - The patient pain as last visit but mentions no zinger type pain.  5/2- The patient is feeling the same.   59- The patient is feeling a little better overall, doing well with exercises with the hips.  6/4- The ps is feeling a little better overall 0/10  7/9-The patient is feeling much better  8/6- The patient is better overall, she is on a treadmill and starting PT.     Back Pain    Past Medical History:   Diagnosis Date   • Anxiety disorder    • Diabetes mellitus (HCC)    • Disease of thyroid gland    • GERD (gastroesophageal reflux disease)    • Hyperlipidemia    • Osteoporosis       Past Surgical History:   Procedure Laterality Date   •  SECTION     • HEMORRHOID SURGERY       The following portions of the patient's history were reviewed and updated as appropriate: allergies, past family  history, past medical history, past social history, past surgical history, and problem list.  Review of Systems   Musculoskeletal:  Positive for back pain.     Physical Exam  Musculoskeletal:         General: Tenderness present.      Thoracic back: Spasms and tenderness present. Decreased range of motion.      Lumbar back: Spasms and tenderness present. No swelling, edema, deformity, signs of trauma or lacerations. Decreased range of motion.        Back:    Neurological:      Gait: Gait is intact.      Deep Tendon Reflexes: Reflexes are normal and symmetric.     SOFT TISSUE ASSESSMENT Hypertonicity and tenderness palpated B T10-S1 erector spinae, hip flexor, glute med/min, QL, hamstring JOINT RESTRICTIONS: T10-S1 and L SIJ ORTHO: SI jt point tenderness: +; Iliana unremarkable for centralization/peripheralization; jerry's, iliac compression, thigh thrust elicit lbp in L SIJ; prone femoral nerve stretch neg for upper lumbar neural tension, elicits R/L SIJ stiffness; sitting root elicits no lbp on R/L; slump test elicits no neural tension R/L    Return in about 1 month (around 9/6/2024) for Recheck.

## 2024-08-06 NOTE — PROGRESS NOTES
Daily Note     Today's date: 2024  Patient name: Lydia Patel  : 1955  MRN: [id number]  Referring provider: Florecita Murguia DC  Dx:   Encounter Diagnosis     ICD-10-CM    1. Lumbar spondylosis  M47.816           Start Time: 161  Stop Time: 164  Total time in clinic (min): 31 minutes    Subjective: Pt presents to PT for review of exercise program secondary to being out for 2 months.      Objective: See treatment diary below      Assessment: Tolerated treatment well. Patient demonstrated fatigue post treatment and exhibited good technique with therapeutic exercises. Reviewed machines, exercises as well as set up and form for each. Pt brought paper print outs and took notes to ensure good follow through and proper performance.      Plan:  continue with fitness program and f/u as needed.     Precautions: standard    Manuals                                                               Neuro Re-Ed             rows  Blk TB 10x; 10# 10x Blk TB 20x; 10# 10x          pulldowns  Blk TB 10x; 10# 10x Blk TB 20x; 8# 10x                                                                           Ther Ex             Leg press  35# 20x 35# 20x          LAQ SL  11# 10x 11# 2x10          HS curls SL  11# 10x 11# 2x10          HS S                                                                 Ther Activity             Pt edu: gym vs [employer], HEP, goals, fitness at [facility] transition RE Fitness exercise set up & progression- RE RE- see assess                       Gait Training                                       Modalities

## 2024-08-23 ENCOUNTER — TELEPHONE (OUTPATIENT)
Age: 69
End: 2024-08-23

## 2024-08-23 NOTE — TELEPHONE ENCOUNTER
Caller: Lydiaailyn Patel    Doctor and/or Office: Vita Jackosn CB#: [phone]     Escalation: Lydia has an ingrown nail of the great left toe.  It is painful and she is afraid of infection setting in.  Can we please force her on?  Thank you.

## 2024-09-03 ENCOUNTER — TELEPHONE (OUTPATIENT)
Dept: FAMILY MEDICINE CLINIC | Facility: CLINIC | Age: 69
End: 2024-09-03

## 2024-09-03 DIAGNOSIS — J06.9 ACUTE URI: Primary | ICD-10-CM

## 2024-09-03 RX ORDER — CEFDINIR 300 MG/1
300 CAPSULE ORAL EVERY 12 HOURS SCHEDULED
Qty: 14 CAPSULE | Refills: 0 | Status: SHIPPED | OUTPATIENT
Start: 2024-09-03 | End: 2024-09-05 | Stop reason: ALTCHOICE

## 2024-09-03 RX ORDER — BENZONATATE 100 MG/1
100 CAPSULE ORAL 3 TIMES DAILY PRN
Qty: 20 CAPSULE | Refills: 0 | Status: SHIPPED | OUTPATIENT
Start: 2024-09-03

## 2024-09-03 NOTE — TELEPHONE ENCOUNTER
Patient c/o sore throat, ear ache, cough that is mostly dry but productive during the night. Symptoms began Saturday     Pt denies fever, chills, body aches, n/v/d    Pt states she took a covid test on Saturday and another one this morning. Both were negative     Pt is asking for meds to be called into the pharmacy or for an appointment with any available provider     Please advise

## 2024-09-04 NOTE — TELEPHONE ENCOUNTER
Pt called in stating that she tested positive for covid this morning. Pt is scheduled for a virtual appt tomorrow. Pt has taken two doses of the antibiotic and would like to know if she should continue with it or not. Please advise.

## 2024-09-05 ENCOUNTER — TELEMEDICINE (OUTPATIENT)
Dept: FAMILY MEDICINE CLINIC | Facility: CLINIC | Age: 69
End: 2024-09-05
Payer: COMMERCIAL

## 2024-09-05 VITALS — BODY MASS INDEX: 18.44 KG/M2 | HEIGHT: 64 IN | WEIGHT: 108 LBS

## 2024-09-05 DIAGNOSIS — U07.1 COVID-19: Primary | ICD-10-CM

## 2024-09-05 PROCEDURE — G2211 COMPLEX E/M VISIT ADD ON: HCPCS | Performed by: NURSE PRACTITIONER

## 2024-09-05 PROCEDURE — 99213 OFFICE O/P EST LOW 20 MIN: CPT | Performed by: NURSE PRACTITIONER

## 2024-09-05 RX ORDER — NIRMATRELVIR AND RITONAVIR 300-100 MG
3 KIT ORAL 2 TIMES DAILY
Qty: 30 TABLET | Refills: 0 | Status: SHIPPED | OUTPATIENT
Start: 2024-09-05 | End: 2024-09-10

## 2024-09-05 NOTE — PROGRESS NOTES
COVID-19 Outpatient Progress Note  Name: Lydia Patel      : 1955      MRN: [id number]  Encounter Provider: MARIO Barreto  Encounter Date: 2024   Encounter department: [facility]    Assessment & Plan   1. COVID-19  -     nirmatrelvir & ritonavir (Paxlovid, 300/100,) tablet therapy pack; Take 3 tablets by mouth 2 (two) times a day for 5 days Take 2 nirmatrelvir tablets + 1 ritonavir tablet together per dose  Disposition:     Discussed symptom directed medication options with patient. Discussed vitamin D, vitamin C, and/or zinc supplementation with patient.     Patient meets criteria for Paxlovid and they have been counseled appropriately regarding risks, benefits, side effects, and alternative treatment options. After discussion, patient agrees to treatment.    Possible side effects of Paxlovid?    Possible side effects of Paxlovid are:  - Liver Problems. Notify us right away if you start to experience loss of appetite, yellowing of your skin and the whites of eyes (jaundice), dark-colored urine, pale colored stools and itchy skin, stomach area (abdominal) pain.  - Resistance to HIV Medicines. If you have untreated HIV infection, Paxlovid may lead to some HIV medicines not working as well in the future.  - Other possible side effects include: altered sense of taste, diarrhea, high blood pressure, or muscle aches.    I have spent a total time of 12 minutes on the day of the encounter for this patient including discussing diagnostic results, discussing prognosis, risks and benefits of treatment options, instructions for management, patient and family education, importance of treatment compliance, risk factor reductions, impressions and documenting in the medical record.       Depression Screening and Follow-up Plan: Patient was screened for depression during today's encounter. They screened negative with a PHQ-2 score of 0.        Encounter provider: MARIO Barreto      Provider located at: [facility]  [address]  [place] [address]     Recent Visits  Date Type Provider Dept   09/03/24 Telephone MARIO Barreto [facility]   Showing recent visits within past 7 days and meeting all other requirements  Today's Visits  Date Type Provider Dept   09/05/24 Telemedicine MARIO Barreto [facility]   Showing today's visits and meeting all other requirements  Future Appointments  No visits were found meeting these conditions.  Showing future appointments within next 150 days and meeting all other requirements    History of Present Illness      This virtual check-in was done via [employer] and patient was informed that this is a secure, HIPAA-compliant platform. She agrees to proceed.    Patient agrees to participate in a virtual check in via telephone or video visit instead of presenting to the office to address urgent/immediate medical needs. Patient is aware this is a billable service. She acknowledged consent and understanding of privacy and security of the video platform. The patient has agreed to participate and understands they can discontinue the visit at any time.    After connecting through [employer], the patient was identified by name and date of birth. Lydia Patel was informed that this was a telemedicine visit and that the exam was being conducted confidentially over secure lines. My office door was closed. No one else was in the room. Lydia Patel acknowledged consent and understanding of privacy and security of the telemedicine visit. I informed the patient that I have reviewed her record in Epic and presented the opportunity for her to ask any questions regarding the visit today. The patient agreed to participate.     Verification of patient location:  Patient is located in the following state in which I hold an active license: [place]    Subjective:   Lydia Patel is a 68 y.o. female who is concerned about  "COVID-19. Patient's symptoms include fever, chills, fatigue, nasal congestion, rhinorrhea, sore throat (intermittent), cough, myalgias and headache. Patient denies malaise, anosmia, loss of taste, shortness of breath, chest tightness, abdominal pain, nausea, vomiting and diarrhea.     - Date of symptom onset: 9/1/2024      COVID-19 vaccination status: Fully vaccinated with booster    Exposure:   Contact with a person who is under investigation (PUI) for or who is positive for COVID-19 within the last 14 days?: No    Hospitalized recently for fever and/or lower respiratory symptoms?: No      Currently a healthcare worker that is involved in direct patient care?: No      Works in a special setting where the risk of COVID-19 transmission may be high? (this may include long-term care, correctional and prison facilities; homeless shelters; assisted-living facilities and group homes.): No      Resident in a special setting where the risk of COVID-19 transmission may be high? (this may include long-term care, correctional and prison facilities; homeless shelters; assisted-living facilities and group homes.): No      No results found for: \"SARSCOV2\", \"EPHYSFD4GDS\", \"SARSCORONAVI\", \"CORONAVIRUSR\", \"SARSCOVAG\", \"SARSCOVAGH\"    Review of Systems   Constitutional:  Positive for chills, fatigue and fever.   HENT:  Positive for congestion, ear pain (intermittent on left), rhinorrhea and sore throat (intermittent).    Respiratory:  Positive for cough. Negative for chest tightness and shortness of breath.    Cardiovascular: Negative.    Gastrointestinal:  Negative for abdominal distention, abdominal pain, diarrhea, nausea and vomiting.   Musculoskeletal:  Positive for myalgias.   Neurological:  Positive for headaches. Negative for dizziness and light-headedness.   Psychiatric/Behavioral: Negative.       Objective     Ht 5' 4\" (1.626 m)   Wt 49 kg (108 lb)   BMI 18.54 kg/m² (Reviewed)    Physical Exam  Constitutional:       " General: She is not in acute distress.     Appearance: Normal appearance. She is well-developed and well-groomed. She is not ill-appearing.   HENT:      Head: Normocephalic and atraumatic.      Right Ear: External ear normal.      Left Ear: External ear normal.      Nose: Congestion present.      Mouth/Throat:      Lips: Pink.      Pharynx: Posterior oropharyngeal erythema present.   Eyes:      General: Lids are normal.      Extraocular Movements: Extraocular movements intact.      Conjunctiva/sclera: Conjunctivae normal.      Pupils: Pupils are equal, round, and reactive to light.   Neck:      Trachea: Trachea and phonation normal.   Cardiovascular:      Rate and Rhythm: Normal rate.   Pulmonary:      Effort: Pulmonary effort is normal. No tachypnea, bradypnea, accessory muscle usage or respiratory distress.      Breath sounds: No wheezing.   Skin:     Coloration: Skin is not ashen, cyanotic or pale.   Neurological:      General: No focal deficit present.      Mental Status: She is alert and oriented to person, place, and time.   Psychiatric:         Mood and Affect: Mood normal.         Behavior: Behavior normal. Behavior is cooperative.

## 2024-10-02 ENCOUNTER — OFFICE VISIT (OUTPATIENT)
Dept: PODIATRY | Facility: CLINIC | Age: 69
End: 2024-10-02
Payer: COMMERCIAL

## 2024-10-02 VITALS
HEIGHT: 64 IN | BODY MASS INDEX: 18.27 KG/M2 | DIASTOLIC BLOOD PRESSURE: 60 MMHG | WEIGHT: 107 LBS | SYSTOLIC BLOOD PRESSURE: 100 MMHG

## 2024-10-02 DIAGNOSIS — L60.0 INGROWN TOENAIL: Primary | ICD-10-CM

## 2024-10-02 PROCEDURE — 99202 OFFICE O/P NEW SF 15 MIN: CPT

## 2024-10-02 NOTE — PROGRESS NOTES
Ambulatory Visit  Name: Lydia Patel      : 1955      MRN: [id number]  Encounter Provider: Ken Bermudez DPM  Encounter Date: 10/2/2024   Encounter department: [facility] [place]    Assessment & Plan  Ingrown toenail         Plan:  Diagnosis and options discussed with patient.  Patient agreeable to the plan as stated below.  Deferred Partial nail avulsion procedure today, patient will call to schedule if nail pain worsens.  Instructed patient on growing nail out and cutting straight across rather into nail corners.  If there are any acute signs of infection (redness, swelling, purulent drainage, fever, chills), patient was instructed to go to the emergency department for evaluation.  Reappoint as needed      History of Present Illness     Lydia Patel is a 68 y.o. female who presents with bilateral great toe ingrown nails.  Patient has had ingrown's for most of her life and is interested in treatment options today.  She does get her nails done at a salon often.  Patient is diabetic.  She has no other podiatric concerns at this time.  She denies any drainage from nails    History obtained from : patient    Review of Systems  Current Outpatient Medications on File Prior to Visit   Medication Sig Dispense Refill    atorvastatin (LIPITOR) 20 mg tablet TAKE 1 TABLET BY MOUTH EVERY DAY AT NIGHT      Calcium Carb-Cholecalciferol 500-10 MG-MCG CHEW Chew 1 tablet daily      Continuous Blood Gluc Sensor (FreeStyle Roxanna 3 Sensor) MISC USE AS DIRECTED EVERY 14 DAYS      Jardiance 25 MG TABS Take 25 mg by mouth daily      Levoxyl 100 MCG tablet Take 100 mcg by mouth every morning      metFORMIN (GLUCOPHAGE-XR) 500 mg 24 hr tablet TAKE TWO TABLETS BY MOUTH TWICE A DAY WITH FOOD      Risedronate Sodium 35 MG TBEC       Trulicity 1.5 MG/0.5ML SOPN INJECT CONTENTS OF 1 PEN UNDER THE SKIN ONCE A WEEK      benzonatate (TESSALON PERLES) 100 mg capsule Take 1 capsule (100 mg total) by mouth 3 (three) times a day  "as needed for cough 20 capsule 0     No current facility-administered medications on file prior to visit.          Objective     /60   Ht 5' 4\" (1.626 m)   Wt 48.5 kg (107 lb)   BMI 18.37 kg/m²     Physical Exam  Pain on palpation noted to the medial hallux nail border bilateral.  There is discomfort with medial lateral squeeze at the level of the great toe.  Intact pedal pulses.  Neurological sensation is grossly intact distally. No tenderness other areas of foot or ankle. No other open lesions or ulcerations noted currently.   "

## 2024-10-04 ENCOUNTER — TELEPHONE (OUTPATIENT)
Dept: ADMINISTRATIVE | Facility: OTHER | Age: 69
End: 2024-10-04

## 2024-10-04 NOTE — TELEPHONE ENCOUNTER
----- Message from Cortney CAN sent at 10/3/2024 12:31 PM EDT -----  Regarding: care gap request  10/03/24 12:31 PM    Hello, our patient attached above has had Hemoglobin A1c completed/performed. Please assist in updating the patient chart by pulling the Care Everywhere (CE) document. The date of service is 4/2024.     Thank you,  Cortney Cooper [facility]

## 2024-10-07 NOTE — TELEPHONE ENCOUNTER
Upon review of the In Basket request we were able to locate, review, and update the patient chart as requested for CT Lung Screening and Hemoglobin A1c.    Any additional questions or concerns should be emailed to the Practice Liaisons via the appropriate education email address, please do not reply via In Basket.    Thank you  Marhta Blanchard MA   [facility]

## 2024-12-16 ENCOUNTER — OFFICE VISIT (OUTPATIENT)
Dept: FAMILY MEDICINE CLINIC | Facility: CLINIC | Age: 69
End: 2024-12-16
Payer: COMMERCIAL

## 2024-12-16 VITALS
WEIGHT: 106 LBS | DIASTOLIC BLOOD PRESSURE: 80 MMHG | OXYGEN SATURATION: 98 % | TEMPERATURE: 97.6 F | HEIGHT: 64 IN | SYSTOLIC BLOOD PRESSURE: 110 MMHG | HEART RATE: 80 BPM | BODY MASS INDEX: 18.1 KG/M2

## 2024-12-16 DIAGNOSIS — H92.03 OTALGIA OF BOTH EARS: Primary | ICD-10-CM

## 2024-12-16 DIAGNOSIS — E11.9 DIABETES MELLITUS WITHOUT COMPLICATION (HCC): ICD-10-CM

## 2024-12-16 DIAGNOSIS — Z12.83 SCREENING EXAM FOR SKIN CANCER: ICD-10-CM

## 2024-12-16 PROCEDURE — 99214 OFFICE O/P EST MOD 30 MIN: CPT | Performed by: NURSE PRACTITIONER

## 2024-12-16 PROCEDURE — G2211 COMPLEX E/M VISIT ADD ON: HCPCS | Performed by: NURSE PRACTITIONER

## 2024-12-16 RX ORDER — CEFDINIR 300 MG/1
300 CAPSULE ORAL EVERY 12 HOURS SCHEDULED
Qty: 14 CAPSULE | Refills: 0 | Status: SHIPPED | OUTPATIENT
Start: 2024-12-16 | End: 2024-12-23

## 2024-12-16 NOTE — ASSESSMENT & PLAN NOTE
Lab Results   Component Value Date    HGBA1C 8.6 04/30/2024     Orders:  •  Ambulatory Referral to Endocrinology; Future  •  Hemoglobin A1C; Future  •  Lipid panel; Future  •  Comprehensive metabolic panel; Future  •  Albumin / creatinine urine ratio; Future

## 2024-12-16 NOTE — PROGRESS NOTES
"Name: Lydia Patel      : 1955      MRN: [id number]  Encounter Provider: MARIO Barreto  Encounter Date: 2024   Encounter department: [facility]  :  Assessment & Plan  Otalgia of both ears    Orders:  •  cefdinir (OMNICEF) 300 mg capsule; Take 1 capsule (300 mg total) by mouth every 12 (twelve) hours for 7 days    Diabetes mellitus without complication (HCC)    Lab Results   Component Value Date    HGBA1C 8.6 2024     Orders:  •  Ambulatory Referral to Endocrinology; Future  •  Hemoglobin A1C; Future  •  Lipid panel; Future  •  Comprehensive metabolic panel; Future  •  Albumin / creatinine urine ratio; Future    Screening exam for skin cancer    Orders:  •  Ambulatory Referral to Dermatology; Future           History of Present Illness     69 y.o.female presenting with right ear feeling clogged for the past few days. She reports that it feels like she is on an airplane and ears will not pop. She also requesting a referral to a new endocrinologist because she does not wish to go all the way to [place] any longer. She is also interested in establish with a area dermatology for annual skin cancer screenings.      Review of Systems   Constitutional: Negative.    HENT:  Positive for ear pain (right feels clogged) and hearing loss (decreasing in hearing due to being clogged). Negative for congestion, ear discharge, postnasal drip, rhinorrhea, sinus pressure, sinus pain, sore throat and tinnitus.    Respiratory: Negative.     Cardiovascular: Negative.    Gastrointestinal: Negative.    Musculoskeletal: Negative.    Skin: Negative.    Neurological: Negative.    Psychiatric/Behavioral: Negative.         Objective   /80 (BP Location: Left arm, Patient Position: Sitting, Cuff Size: Standard)   Pulse 80   Temp 97.6 °F (36.4 °C) (Temporal)   Ht 5' 4\" (1.626 m)   Wt 48.1 kg (106 lb)   SpO2 98%   BMI 18.19 kg/m² (Reviewed)     Physical Exam  Vitals reviewed. "   Constitutional:       General: She is not in acute distress.     Appearance: She is not ill-appearing.   HENT:      Head: Normocephalic and atraumatic.      Right Ear: Ear canal and external ear normal. Tympanic membrane is retracted.      Left Ear: Ear canal and external ear normal. Tympanic membrane is retracted.      Nose: Nose normal.      Mouth/Throat:      Mouth: Mucous membranes are moist.      Pharynx: Oropharynx is clear.   Eyes:      Extraocular Movements: Extraocular movements intact.      Conjunctiva/sclera: Conjunctivae normal.      Pupils: Pupils are equal, round, and reactive to light.   Cardiovascular:      Rate and Rhythm: Normal rate and regular rhythm.      Pulses: no weak pulses.           Dorsalis pedis pulses are 2+ on the right side and 2+ on the left side.        Posterior tibial pulses are 2+ on the right side and 2+ on the left side.      Heart sounds: Normal heart sounds.   Pulmonary:      Effort: Pulmonary effort is normal.      Breath sounds: Normal breath sounds.   Abdominal:      General: Abdomen is flat. Bowel sounds are normal. There is no distension.      Palpations: Abdomen is soft.      Tenderness: There is no abdominal tenderness.   Musculoskeletal:      Cervical back: Neck supple.   Feet:      Right foot:      Skin integrity: No ulcer, skin breakdown, erythema, warmth, callus or dry skin.      Left foot:      Skin integrity: No ulcer, skin breakdown, erythema, warmth, callus or dry skin.   Lymphadenopathy:      Cervical: No cervical adenopathy.   Skin:     General: Skin is warm and dry.      Capillary Refill: Capillary refill takes less than 2 seconds.   Neurological:      Mental Status: She is alert and oriented to person, place, and time.   Psychiatric:         Mood and Affect: Mood normal.         Behavior: Behavior normal.     Patient's shoes and socks removed.    Right Foot/Ankle   Right Foot Inspection  Skin Exam: skin normal and skin intact. No dry skin, no warmth, no  callus, no erythema, no maceration, no abnormal color, no pre-ulcer, no ulcer and no callus.     Toe Exam: ROM and strength within normal limits.     Sensory   Proprioception: intact  Monofilament testing: intact    Vascular  Capillary refills: < 3 seconds  The right DP pulse is 2+. The right PT pulse is 2+.     Left Foot/Ankle  Left Foot Inspection  Skin Exam: skin normal and skin intact. No dry skin, no warmth, no erythema, no maceration, normal color, no pre-ulcer, no ulcer and no callus.     Toe Exam: ROM and strength within normal limits.     Sensory   Proprioception: intact  Monofilament testing: intact    Vascular  Capillary refills: < 3 seconds  The left DP pulse is 2+. The left PT pulse is 2+.     Assign Risk Category  No deformity present  No loss of protective sensation  No weak pulses  Risk: 0

## 2025-02-05 ENCOUNTER — RA CDI HCC (OUTPATIENT)
Dept: OTHER | Facility: HOSPITAL | Age: 70
End: 2025-02-05

## 2025-02-07 ENCOUNTER — APPOINTMENT (OUTPATIENT)
Dept: LAB | Facility: CLINIC | Age: 70
End: 2025-02-07
Payer: COMMERCIAL

## 2025-02-07 DIAGNOSIS — E11.9 DIABETES MELLITUS WITHOUT COMPLICATION (HCC): ICD-10-CM

## 2025-02-07 LAB
ALBUMIN SERPL BCG-MCNC: 4.5 G/DL (ref 3.5–5)
ALP SERPL-CCNC: 57 U/L (ref 34–104)
ALT SERPL W P-5'-P-CCNC: 19 U/L (ref 7–52)
ANION GAP SERPL CALCULATED.3IONS-SCNC: 12 MMOL/L (ref 4–13)
AST SERPL W P-5'-P-CCNC: 15 U/L (ref 13–39)
BILIRUB SERPL-MCNC: 0.39 MG/DL (ref 0.2–1)
BUN SERPL-MCNC: 13 MG/DL (ref 5–25)
CALCIUM SERPL-MCNC: 9.8 MG/DL (ref 8.4–10.2)
CHLORIDE SERPL-SCNC: 101 MMOL/L (ref 96–108)
CHOLEST SERPL-MCNC: 171 MG/DL (ref ?–200)
CO2 SERPL-SCNC: 26 MMOL/L (ref 21–32)
CREAT SERPL-MCNC: 0.56 MG/DL (ref 0.6–1.3)
CREAT UR-MCNC: 34.5 MG/DL
EST. AVERAGE GLUCOSE BLD GHB EST-MCNC: 272 MG/DL
GFR SERPL CREATININE-BSD FRML MDRD: 94 ML/MIN/1.73SQ M
GLUCOSE P FAST SERPL-MCNC: 234 MG/DL (ref 65–99)
HBA1C MFR BLD: 11.1 %
HDLC SERPL-MCNC: 52 MG/DL
LDLC SERPL CALC-MCNC: 98 MG/DL (ref 0–100)
MICROALBUMIN UR-MCNC: <7 MG/L
NONHDLC SERPL-MCNC: 119 MG/DL
POTASSIUM SERPL-SCNC: 5 MMOL/L (ref 3.5–5.3)
PROT SERPL-MCNC: 6.7 G/DL (ref 6.4–8.4)
SODIUM SERPL-SCNC: 139 MMOL/L (ref 135–147)
TRIGL SERPL-MCNC: 106 MG/DL (ref ?–150)

## 2025-02-07 PROCEDURE — 83036 HEMOGLOBIN GLYCOSYLATED A1C: CPT

## 2025-02-07 PROCEDURE — 36415 COLL VENOUS BLD VENIPUNCTURE: CPT

## 2025-02-07 PROCEDURE — 80053 COMPREHEN METABOLIC PANEL: CPT

## 2025-02-07 PROCEDURE — 82570 ASSAY OF URINE CREATININE: CPT

## 2025-02-07 PROCEDURE — 82043 UR ALBUMIN QUANTITATIVE: CPT

## 2025-02-07 PROCEDURE — 80061 LIPID PANEL: CPT

## 2025-02-10 ENCOUNTER — RESULTS FOLLOW-UP (OUTPATIENT)
Dept: FAMILY MEDICINE CLINIC | Facility: CLINIC | Age: 70
End: 2025-02-10

## 2025-02-12 ENCOUNTER — OFFICE VISIT (OUTPATIENT)
Dept: FAMILY MEDICINE CLINIC | Facility: CLINIC | Age: 70
End: 2025-02-12
Payer: COMMERCIAL

## 2025-02-12 VITALS
TEMPERATURE: 97.5 F | BODY MASS INDEX: 18.27 KG/M2 | WEIGHT: 107 LBS | SYSTOLIC BLOOD PRESSURE: 112 MMHG | DIASTOLIC BLOOD PRESSURE: 64 MMHG | HEIGHT: 64 IN

## 2025-02-12 DIAGNOSIS — Z00.00 MEDICARE ANNUAL WELLNESS VISIT, SUBSEQUENT: Primary | ICD-10-CM

## 2025-02-12 DIAGNOSIS — E78.2 MIXED HYPERLIPIDEMIA: ICD-10-CM

## 2025-02-12 DIAGNOSIS — E11.9 DIABETES MELLITUS WITHOUT COMPLICATION (HCC): ICD-10-CM

## 2025-02-12 DIAGNOSIS — E03.8 OTHER SPECIFIED HYPOTHYROIDISM: ICD-10-CM

## 2025-02-12 DIAGNOSIS — E13.9 LADA (LATENT AUTOIMMUNE DIABETES OF ADULTHOOD) (HCC): ICD-10-CM

## 2025-02-12 PROCEDURE — 99213 OFFICE O/P EST LOW 20 MIN: CPT | Performed by: NURSE PRACTITIONER

## 2025-02-12 PROCEDURE — G2211 COMPLEX E/M VISIT ADD ON: HCPCS | Performed by: NURSE PRACTITIONER

## 2025-02-12 PROCEDURE — G0439 PPPS, SUBSEQ VISIT: HCPCS | Performed by: NURSE PRACTITIONER

## 2025-02-12 NOTE — PROGRESS NOTES
Name: Lydia Patel      : 1955      MRN: [id number]  Encounter Provider: MARIO Barreto  Encounter Date: 2025   Encounter department: [facility]  Medicare annual wellness visit, subsequent  Patient is going to investigate when she is due for next colonoscopy and her gynecologist will order her mammogram and bone density test.       Diabetes mellitus without complication (HCC)  Patient reports that she has difficulties with dietary restriction - she has a scheduled appointment to establish with endocrinology tomorrow - 2025.  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  Discussed possible need to start on insulin either long acting or prandial rapid acting  Lab Results   Component Value Date    HGBA1C 11.1 (H) 2025     Orders:  •  Comprehensive metabolic panel; Future    Mixed hyperlipidemia  Stable - continue on current medication and recheck in 6 months  Orders:  •  Lipid panel; Future    Other specified hypothyroidism    Orders:  •  TSH, 3rd generation with Free T4 reflex; Future      Patient instructed to return in 6 months for next follow-up/exam - sooner if needed        Depression Screening and Follow-up Plan: Patient was screened for depression during today's encounter. They screened negative with a PHQ-2 score of 0.        Preventive health issues were discussed with patient, and age appropriate screening tests were ordered as noted in patient's After Visit Summary. Personalized health advice and appropriate referrals for health education or preventive services given if needed, as noted in patient's After  Visit Summary.    History of Present Illness     69 y.o.female presenting for her Medicare annual wellness visit and routine follow-up for diabetes. Patient's recent lab results reviewed and discussed possible treatment options.         Patient Care Team:  MARIO Barreto as PCP - General (Family Medicine)    Review of Systems   Constitutional:  Negative for activity change, appetite change and unexpected weight change.   HENT:  Negative for dental problem, ear pain, hearing loss, nosebleeds, sneezing, sore throat, tinnitus and trouble swallowing.    Eyes:  Negative for visual disturbance.   Respiratory:  Negative for cough, chest tightness, shortness of breath and wheezing.    Cardiovascular:  Negative for chest pain, palpitations and leg swelling.   Gastrointestinal:  Negative for abdominal distention, abdominal pain, constipation, diarrhea and nausea.   Endocrine: Negative for polydipsia, polyphagia and polyuria.   Genitourinary: Negative.    Musculoskeletal:  Negative for arthralgias, back pain, myalgias and neck pain.   Skin:  Negative for color change and rash.   Allergic/Immunologic: Negative for environmental allergies.   Neurological:  Negative for dizziness, weakness, light-headedness and headaches.   Hematological: Negative.    Psychiatric/Behavioral: Negative.  Negative for dysphoric mood and sleep disturbance. The patient is not nervous/anxious.      Medical History Reviewed by provider this encounter:  Tobacco  Allergies  Meds  Problems  Med Hx  Surg Hx  Fam Hx       Annual Wellness Visit Questionnaire   Lydia is here for her Subsequent Wellness visit. Last Medicare Wellness visit information reviewed, patient interviewed and updates made to the record today.      Health Risk Assessment:   Patient rates overall health as very good. Patient feels that their physical health rating is same. Patient is very satisfied with their life. Eyesight was rated as same. Hearing was rated as same. Patient  feels that their emotional and mental health rating is same. Patients states they are never, rarely angry. Patient states they are never, rarely unusually tired/fatigued. Pain experienced in the last 7 days has been none. Patient states that she has experienced no weight loss or gain in last 6 months.     Depression Screening:   PHQ-2 Score: 0      Fall Risk Screening:   In the past year, patient has experienced: no history of falling in past year      Urinary Incontinence Screening:   Patient has not leaked urine accidently in the last six months.     Home Safety:  Patient does not have trouble with stairs inside or outside of their home. Patient has working smoke alarms and has working carbon monoxide detector. Home safety hazards include: loose rugs on the floor.     Nutrition:   Current diet is Regular.     Medications:   Patient is currently taking over-the-counter supplements. OTC medications include: see medication list. Patient is able to manage medications.     Activities of Daily Living (ADLs)/Instrumental Activities of Daily Living (IADLs):   Walk and transfer into and out of bed and chair?: Yes  Dress and groom yourself?: Yes    Bathe or shower yourself?: Yes    Feed yourself? Yes  Do your laundry/housekeeping?: Yes  Manage your money, pay your bills and track your expenses?: Yes  Make your own meals?: Yes    Do your own shopping?: Yes    Previous Hospitalizations:   Any hospitalizations or ED visits within the last 12 months?: No      Advance Care Planning:   Living will: Yes    Durable POA for healthcare: Yes    Advanced directive: Yes      Cognitive Screening:   Provider or family/friend/caregiver concerned regarding cognition?: No    PREVENTIVE SCREENINGS      Cardiovascular Screening:    General: History Lipid Disorder and Screening Current      Diabetes Screening:     General: History Diabetes and Screening Current      Breast Cancer Screening:     General: Screening Current      Cervical Cancer  "Screening:    General: Screening Not Indicated      Osteoporosis Screening:    General: Screening Not Indicated and History Osteoporosis      Abdominal Aortic Aneurysm (AAA) Screening:        General: Screening Not Indicated      Lung Cancer Screening:     General: Screening Not Indicated    Screening, Brief Intervention, and Referral to Treatment (SBIRT)     Screening    Typical number of drinks in a week: 1    Single Item Drug Screening:  How often have you used an illegal drug (including marijuana) or a prescription medication for non-medical reasons in the past year? never    Single Item Drug Screen Score: 0  Interpretation: Negative screen for possible drug use disorder    Other Counseling Topics:   Car/seat belt/driving safety, skin self-exam, sunscreen and calcium and vitamin D intake and regular weightbearing exercise.     Social Drivers of Health     Financial Resource Strain: Low Risk  (2/8/2024)    Overall Financial Resource Strain (CARDIA)    • Difficulty of Paying Living Expenses: Not hard at all   Food Insecurity: No Food Insecurity (2/12/2025)    Hunger Vital Sign    • Worried About Running Out of Food in the Last Year: Never true    • Ran Out of Food in the Last Year: Never true   Transportation Needs: No Transportation Needs (2/12/2025)    PRAPARE - Transportation    • Lack of Transportation (Medical): No    • Lack of Transportation (Non-Medical): No   Housing Stability: Low Risk  (2/12/2025)    Housing Stability Vital Sign    • Unable to Pay for Housing in the Last Year: No    • Number of Times Moved in the Last Year: 0    • Homeless in the Last Year: No   Utilities: Not At Risk (2/12/2025)    [facility] Utilities    • Threatened with loss of utilities: No     No results found.    Objective   /64   Temp 97.5 °F (36.4 °C)   Ht 5' 4\" (1.626 m)   Wt 48.5 kg (107 lb)   BMI 18.37 kg/m² (Reviewed)    Physical Exam  Vitals reviewed.   Constitutional:       General: She is not in acute distress.     " Appearance: Normal appearance. She is well-developed and well-groomed. She is not ill-appearing.   HENT:      Head: Normocephalic and atraumatic.      Right Ear: External ear normal.      Left Ear: External ear normal.      Nose: Nose normal.      Mouth/Throat:      Lips: Pink.      Mouth: Mucous membranes are moist.      Pharynx: Oropharynx is clear.   Eyes:      General: Lids are normal.      Extraocular Movements: Extraocular movements intact.      Conjunctiva/sclera: Conjunctivae normal.      Pupils: Pupils are equal, round, and reactive to light.   Neck:      Thyroid: No thyromegaly or thyroid tenderness.      Trachea: Trachea and phonation normal.   Cardiovascular:      Rate and Rhythm: Normal rate and regular rhythm.      Pulses:           Radial pulses are 2+ on the right side and 2+ on the left side.        Dorsalis pedis pulses are 2+ on the right side and 2+ on the left side.        Posterior tibial pulses are 2+ on the right side and 2+ on the left side.      Heart sounds: Normal heart sounds. No murmur heard.  Pulmonary:      Effort: Pulmonary effort is normal.      Breath sounds: Normal breath sounds.   Abdominal:      General: Abdomen is flat. Bowel sounds are normal. There is no distension.      Palpations: Abdomen is soft.      Tenderness: There is no abdominal tenderness.   Musculoskeletal:      Cervical back: Neck supple.      Right lower leg: No edema.      Left lower leg: No edema.   Skin:     General: Skin is warm and dry.      Capillary Refill: Capillary refill takes less than 2 seconds.   Neurological:      General: No focal deficit present.      Mental Status: She is alert and oriented to person, place, and time.      Cranial Nerves: Cranial nerves 2-12 are intact.   Psychiatric:         Mood and Affect: Mood normal.         Behavior: Behavior normal. Behavior is cooperative.

## 2025-02-12 NOTE — PATIENT INSTRUCTIONS
Medicare Preventive Visit Patient Instructions  Thank you for completing your Welcome to Medicare Visit or Medicare Annual Wellness Visit today. Your next wellness visit will be due in one year (2/13/2026).  The screening/preventive services that you may require over the next 5-10 years are detailed below. Some tests may not apply to you based off risk factors and/or age. Screening tests ordered at today's visit but not completed yet may show as past due. Also, please note that scanned in results may not display below.  Preventive Screenings:  Service Recommendations Previous Testing/Comments   Colorectal Cancer Screening  * Colonoscopy    * Fecal Occult Blood Test (FOBT)/Fecal Immunochemical Test (FIT)  * Fecal DNA/Cologuard Test  * Flexible Sigmoidoscopy Age: 45-75 years old   Colonoscopy: every 10 years (may be performed more frequently if at higher risk)  OR  FOBT/FIT: every 1 year  OR  Cologuard: every 3 years  OR  Sigmoidoscopy: every 5 years  Screening may be recommended earlier than age 45 if at higher risk for colorectal cancer. Also, an individualized decision between you and your healthcare provider will decide whether screening between the ages of 76-85 would be appropriate. Colonoscopy: Not on file  FOBT/FIT: Not on file  Cologuard: Not on file  Sigmoidoscopy: Not on file          Breast Cancer Screening Age: 40+ years old  Frequency: every 1-2 years  Not required if history of left and right mastectomy Mammogram: 07/16/2024    Screening Current   Cervical Cancer Screening Between the ages of 21-29, pap smear recommended once every 3 years.   Between the ages of 30-65, can perform pap smear with HPV co-testing every 5 years.   Recommendations may differ for women with a history of total hysterectomy, cervical cancer, or abnormal pap smears in past. Pap Smear: Not on file    Screening Not Indicated   Hepatitis C Screening Once for adults born between 1945 and 1965  More frequently in patients at high  risk for Hepatitis C Hep C Antibody: Not on file        Diabetes Screening 1-2 times per year if you're at risk for diabetes or have pre-diabetes Fasting glucose: 234 mg/dL (2/7/2025)  A1C: 11.1 % (2/7/2025)  Screening Not Indicated  History Diabetes   Cholesterol Screening Once every 5 years if you don't have a lipid disorder. May order more often based on risk factors. Lipid panel: 02/07/2025    Screening Not Indicated  History Lipid Disorder     Other Preventive Screenings Covered by Medicare:  Abdominal Aortic Aneurysm (AAA) Screening: covered once if your at risk. You're considered to be at risk if you have a family history of AAA.  Lung Cancer Screening: covers low dose CT scan once per year if you meet all of the following conditions: (1) Age 55-77; (2) No signs or symptoms of lung cancer; (3) Current smoker or have quit smoking within the last 15 years; (4) You have a tobacco smoking history of at least 20 pack years (packs per day multiplied by number of years you smoked); (5) You get a written order from a healthcare provider.  Glaucoma Screening: covered annually if you're considered high risk: (1) You have diabetes OR (2) Family history of glaucoma OR (3)  aged 50 and older OR (4)  American aged 65 and older  Osteoporosis Screening: covered every 2 years if you meet one of the following conditions: (1) You're estrogen deficient and at risk for osteoporosis based off medical history and other findings; (2) Have a vertebral abnormality; (3) On glucocorticoid therapy for more than 3 months; (4) Have primary hyperparathyroidism; (5) On osteoporosis medications and need to assess response to drug therapy.   Last bone density test (DXA Scan): 05/09/2023.  HIV Screening: covered annually if you're between the age of 15-65. Also covered annually if you are younger than 15 and older than 65 with risk factors for HIV infection. For pregnant patients, it is covered up to 3 times per  pregnancy.    Immunizations:  Immunization Recommendations   Influenza Vaccine Annual influenza vaccination during flu season is recommended for all persons aged >= 6 months who do not have contraindications   Pneumococcal Vaccine   * Pneumococcal conjugate vaccine = PCV13 (Prevnar 13), PCV15 (Vaxneuvance), PCV20 (Prevnar 20)  * Pneumococcal polysaccharide vaccine = PPSV23 (Pneumovax) Adults 19-65 yo with certain risk factors or if 65+ yo  If never received any pneumonia vaccine: recommend Prevnar 20 (PCV20)  Give PCV20 if previously received 1 dose of PCV13 or PPSV23   Hepatitis B Vaccine 3 dose series if at intermediate or high risk (ex: diabetes, end stage renal disease, liver disease)   Respiratory syncytial virus (RSV) Vaccine - COVERED BY MEDICARE PART D  * RSVPreF3 (Arexvy) CDC recommends that adults 60 years of age and older may receive a single dose of RSV vaccine using shared clinical decision-making (SCDM)   Tetanus (Td) Vaccine - COST NOT COVERED BY MEDICARE PART B Following completion of primary series, a booster dose should be given every 10 years to maintain immunity against tetanus. Td may also be given as tetanus wound prophylaxis.   Tdap Vaccine - COST NOT COVERED BY MEDICARE PART B Recommended at least once for all adults. For pregnant patients, recommended with each pregnancy.   Shingles Vaccine (Shingrix) - COST NOT COVERED BY MEDICARE PART B  2 shot series recommended in those 19 years and older who have or will have weakened immune systems or those 50 years and older     Health Maintenance Due:      Topic Date Due   • Hepatitis C Screening  Never done   • Colorectal Cancer Screening  Never done   • Breast Cancer Screening: Mammogram  07/16/2025   • DXA SCAN  05/09/2028     Immunizations Due:      Topic Date Due   • COVID-19 Vaccine (6 - 2024-25 season) 09/01/2024     Advance Directives   What are advance directives?  Advance directives are legal documents that state your wishes and plans for  medical care. These plans are made ahead of time in case you lose your ability to make decisions for yourself. Advance directives can apply to any medical decision, such as the treatments you want, and if you want to donate organs.   What are the types of advance directives?  There are many types of advance directives, and each state has rules about how to use them. You may choose a combination of any of the following:  Living will:  This is a written record of the treatment you want. You can also choose which treatments you do not want, which to limit, and which to stop at a certain time. This includes surgery, medicine, IV fluid, and tube feedings.   Durable power of  for healthcare (DPAHC):  This is a written record that states who you want to make healthcare choices for you when you are unable to make them for yourself. This person, called a proxy, is usually a family member or a friend. You may choose more than 1 proxy.  Do not resuscitate (DNR) order:  A DNR order is used in case your heart stops beating or you stop breathing. It is a request not to have certain forms of treatment, such as CPR. A DNR order may be included in other types of advance directives.  Medical directive:  This covers the care that you want if you are in a coma, near death, or unable to make decisions for yourself. You can list the treatments you want for each condition. Treatment may include pain medicine, surgery, blood transfusions, dialysis, IV or tube feedings, and a ventilator (breathing machine).  Values history:  This document has questions about your views, beliefs, and how you feel and think about life. This information can help others choose the care that you would choose.  Why are advance directives important?  An advance directive helps you control your care. Although spoken wishes may be used, it is better to have your wishes written down. Spoken wishes can be misunderstood, or not followed. Treatments may be given  even if you do not want them. An advance directive may make it easier for your family to make difficult choices about your care.   Underweight  Underweight is defined as having a body mass index (BMI) of less than 18.5 kg/m2   Anorexia  is a loss of appetite, decreased food intake, or both. Your appetite naturally decreases as you get older. You also get full faster than you used to. This occurs because your body needs less energy. Other body changes can also lead to a decreased appetite. Even though some appetite loss is normal, you still need to get enough calories and nutrients to keep you healthy. You can start to lose too much weight if you do not eat as much food as your body needs. Unwanted weight loss can cause health problems, or worsen health problems you already have. You can also become dehydrated if you do not drink enough liquid.  How to eat healthy and get enough nutrients:   Choose healthy foods.  Eat a variety of fruits, vegetables, whole grains, low-fat dairy foods, lean meats, and other protein foods. Limit foods high in fat, sugar, and salt. Limit or avoid alcohol as directed. Work with a dietitian to help you plan your meals if you need to follow a special diet. A dietitian can also teach you how to modify foods if you have trouble chewing or swallowing.   Snack on healthy foods between meals  if you only eat a small amount during meals. Snacks provide extra healthy nutrients and calories between meals. Examples include fruit, cheese, and whole grain crackers.   Drink liquids as directed  to avoid dehydration. Drink liquids between meals if they cause you to get full too quickly during meals. Ask how much liquid to drink each day and which liquids are best for you.   Use herbs, spices, and flavor enhancers to add flavor to foods.  Avoid using herbs and spice blends that also contain sodium. Ask your healthcare provider or dietitian about flavor enhancers. Flavor enhancers with ham, natural  lilly, and roast beef flavors can also be sprinkled on food to add flavor.   Share meals with others as often as you can.  Eating with others may help you to eat better during meal time. Ask family members, neighbors, or friends to join you for lunch. There are also senior centers where you can meet people, and share meals with them.   Ask family and friends for help  with shopping or preparing foods. Ask for a ride to the grocery store, if needed.       © Copyright [employer] 2018 Information is for End User's use only and may not be sold, redistributed or otherwise used for commercial purposes. All illustrations and images included in CareNotes® are the copyrighted property of [employer] or [employer]

## 2025-02-12 NOTE — ASSESSMENT & PLAN NOTE
Patient reports that she has difficulties with dietary restriction - she has a scheduled appointment to establish with endocrinology tomorrow - 02/13/2025.  Discussed possible need to start on insulin either long acting or prandial rapid acting  Lab Results   Component Value Date    HGBA1C 11.1 (H) 02/07/2025     Orders:  •  Comprehensive metabolic panel; Future

## 2025-02-12 NOTE — ASSESSMENT & PLAN NOTE
Patient reports that she has difficulties with dietary restriction - she has a scheduled appointment to establish with endocrinology tomorrow - 02/13/2025.  Discussed possible need to start on insulin either long acting or prandial rapid acting  Lab Results   Component Value Date    HGBA1C 11.1 (H) 02/07/2025

## 2025-02-12 NOTE — ASSESSMENT & PLAN NOTE
Orders:  •  TSH, 3rd generation with Free T4 reflex; Future      Patient instructed to return in 6 months for next follow-up/exam - sooner if needed

## 2025-02-13 ENCOUNTER — CONSULT (OUTPATIENT)
Dept: ENDOCRINOLOGY | Facility: CLINIC | Age: 70
End: 2025-02-13
Payer: COMMERCIAL

## 2025-02-13 ENCOUNTER — TELEPHONE (OUTPATIENT)
Age: 70
End: 2025-02-13

## 2025-02-13 VITALS
RESPIRATION RATE: 12 BRPM | TEMPERATURE: 97.3 F | HEART RATE: 79 BPM | HEIGHT: 64 IN | SYSTOLIC BLOOD PRESSURE: 118 MMHG | DIASTOLIC BLOOD PRESSURE: 72 MMHG | WEIGHT: 106 LBS | BODY MASS INDEX: 18.1 KG/M2 | OXYGEN SATURATION: 96 %

## 2025-02-13 DIAGNOSIS — E03.8 OTHER SPECIFIED HYPOTHYROIDISM: ICD-10-CM

## 2025-02-13 DIAGNOSIS — E06.3 HYPOTHYROIDISM DUE TO HASHIMOTO THYROIDITIS: ICD-10-CM

## 2025-02-13 DIAGNOSIS — E11.65 TYPE 2 DIABETES MELLITUS WITH HYPERGLYCEMIA, WITHOUT LONG-TERM CURRENT USE OF INSULIN (HCC): ICD-10-CM

## 2025-02-13 DIAGNOSIS — E11.9 DIABETES MELLITUS WITHOUT COMPLICATION (HCC): ICD-10-CM

## 2025-02-13 DIAGNOSIS — E55.9 VITAMIN D DEFICIENCY: ICD-10-CM

## 2025-02-13 DIAGNOSIS — E13.9 LADA (LATENT AUTOIMMUNE DIABETES OF ADULTHOOD) (HCC): Primary | ICD-10-CM

## 2025-02-13 DIAGNOSIS — M81.0 AGE-RELATED OSTEOPOROSIS WITHOUT CURRENT PATHOLOGICAL FRACTURE: ICD-10-CM

## 2025-02-13 DIAGNOSIS — E78.2 MIXED HYPERLIPIDEMIA: ICD-10-CM

## 2025-02-13 PROCEDURE — 99204 OFFICE O/P NEW MOD 45 MIN: CPT | Performed by: INTERNAL MEDICINE

## 2025-02-13 PROCEDURE — 95251 CONT GLUC MNTR ANALYSIS I&R: CPT | Performed by: INTERNAL MEDICINE

## 2025-02-13 RX ORDER — PEN NEEDLE, DIABETIC 32GX 5/32"
NEEDLE, DISPOSABLE MISCELLANEOUS
Qty: 200 EACH | Refills: 5 | Status: SHIPPED | OUTPATIENT
Start: 2025-02-13

## 2025-02-13 RX ORDER — INSULIN DEGLUDEC 100 U/ML
12 INJECTION, SOLUTION SUBCUTANEOUS
Qty: 15 ML | Refills: 1 | Status: SHIPPED | OUTPATIENT
Start: 2025-02-13

## 2025-02-13 NOTE — TELEPHONE ENCOUNTER
The pt called she had an appt earlier today and did speak with her insurance who stated they will not cover [facility] and she will now be going to a [facility] to complete. She wants to know if the slips need to be re-entered or will she be able to use them tomorrow when she goes to get the lab work complete.

## 2025-02-13 NOTE — PATIENT INSTRUCTIONS
Patient Education     Insulin Degludec (IN menodza pa Floyd)   Brand Names: US Tresiba; Tresiba FlexTouch   Brand Names: Siri Tresiba; Tresiba Penfill   What is this drug used for?   It is used to lower blood sugar in patients with high blood sugar (diabetes).  What do I need to tell my doctor BEFORE I take this drug?   If you are allergic to this drug; any part of this drug; or any other drugs, foods, or substances. Tell your doctor about the allergy and what signs you had.  If you have any of these health problems: Acidic blood problem or low blood sugar.  This is not a list of all drugs or health problems that interact with this drug.  Tell your doctor and pharmacist about all of your drugs (prescription or OTC, natural products, vitamins) and health problems. You must check to make sure that it is safe for you to take this drug with all of your drugs and health problems. Do not start, stop, or change the dose of any drug without checking with your doctor.  What are some things I need to know or do while I take this drug?   All products:   Tell all of your health care providers that you take this drug. This includes your doctors, nurses, pharmacists, and dentists.  Low blood sugar may happen with this drug. Very low blood sugar can lead to seizures, passing out, long lasting brain damage, and sometimes death. Talk with the doctor.  Low blood potassium may happen with this drug. If not treated, this can lead to a heartbeat that is not normal, very bad breathing problems, and sometimes death. Talk with the doctor.  Avoid driving and doing other tasks or actions that call for you to be alert until you see how this drug affects you.  Some diabetes drugs like pioglitazone or rosiglitazone may cause heart failure or make it worse in people who already have it. Using insulin with these drugs may increase this risk. If you also take one of these drugs, talk with the doctor.  Be sure you have the right insulin  product. Insulin products come in many containers like vials, cartridges, and pens. Be sure that you know how to measure and get your dose ready. If you have any questions, call your doctor or pharmacist.  It may be harder to control blood sugar during times of stress such as fever, infection, injury, or surgery. A change in physical activity, exercise, or diet may also affect blood sugar.  Wear disease medical alert ID (identification).  Do not drive if your blood sugar has been low. There is a greater chance of you having a crash.  Check your blood sugar as you have been told by your doctor.  Have blood work checked as you have been told by the doctor. Talk with the doctor.  Talk with your doctor before you drink alcohol or take products that have alcohol in them.  Do not share your insulin product with another person. This includes any pens, cartridge devices, needles, or syringes, even if the needle has been changed. Sharing may pass infections from one person to another. This includes infections you may not know you have.  If you are 65 or older, use this drug with care. You could have more side effects.  Tell your doctor if you are pregnant, plan on getting pregnant, or are breast-feeding. You will need to talk about the benefits and risks to you and the baby.  U-200 shot product:   This brand of insulin is 2 times stronger than other brands. Use extra care when you measure a dose. Accidental overdose may lead to very bad side effects or life-threatening low blood sugar. Talk with the doctor.  What are some side effects that I need to call my doctor about right away?   WARNING/CAUTION: Even though it may be rare, some people may have very bad and sometimes deadly side effects when taking a drug. Tell your doctor or get medical help right away if you have any of the following signs or symptoms that may be related to a very bad side effect:  Signs of an allergic reaction, like rash; hives; itching; red, swollen,  blistered, or peeling skin with or without fever; wheezing; tightness in the chest or throat; trouble breathing, swallowing, or talking; unusual hoarseness; or swelling of the mouth, face, lips, tongue, or throat. Rarely, some allergic reactions have been life-threatening.  Signs of low potassium levels like muscle pain or weakness, muscle cramps, or a heartbeat that does not feel normal.  Thick skin, pits, or lumps where the injection was given.  Swelling in the arms or legs.  Low blood sugar may happen. Signs may be dizziness or passing out, blurred eyesight, mood changes, slurred speech, headache, feeling sleepy or weak, shaking, fast heartbeat, confusion, hunger, sweating, or seizures. Call the doctor right away if any of these signs happen. Follow what you have been told to do if low blood sugar happens. This may include taking glucose tablets, liquid glucose, or some fruit juices.  What are some other side effects of this drug?   All drugs may cause side effects. However, many people have no side effects or only have minor side effects. Call your doctor or get medical help if any of these side effects or any other side effects bother you or do not go away:  Nose or throat irritation.  Signs of a common cold.  Headache.  Diarrhea.  Weight gain.  Irritation where the shot is given.  These are not all of the side effects that may occur. If you have questions about side effects, call your doctor. Call your doctor for medical advice about side effects.  You may report side effects to your national health agency.  You may report side effects to the FDA at 1-380.570.3424. You may also report side effects at https://www.fda.gov/medwatch.  How is this drug best taken?   Use this drug as ordered by your doctor. Read all information given to you. Follow all instructions closely.  All products:   It is given as a shot into the fatty part of the skin on the top of the thigh, belly area, or upper arm.  If you will be giving  yourself the shot, your doctor or nurse will teach you how to give the shot.  Wash your hands before use.  Move site where you give the shot each time.  Do not give into skin that is thickened, or has pits or lumps.  Do not give into skin that is irritated, tender, bruised, red, scaly, hard, scarred, or has stretch marks.  Do not use if the solution is cloudy, leaking, or has particles.  Do not use if solution changes color.  Throw away needles in a needle/sharp disposal box. Do not reuse needles or other items. When the box is full, follow all local rules for getting rid of it. Talk with a doctor or pharmacist if you have any questions.  Do not mix this insulin in the same syringe with other types of insulin.  Follow the diet and workout plan that your doctor told you about.  This drug must not be used in an insulin pump. If you have questions, talk with the doctor.  Children:   Give this drug at the same time of day.  Vials:   Do not draw into a syringe and store for future use.  Prefilled syringes or pens:   Remove all pen needle covers before injecting a dose (there may be 2). If you are not sure what type of pen needle you have or how to use it, talk with the doctor.  This product may make a clicking sound as you prepare the dose. Do not prepare the dose by counting the clicks. Doing so could lead to using the wrong dose.  Take off the needle after each shot. Do not store this device with the needle on it.  Do not move this drug from the pen to a syringe.  What do I do if I miss a dose?   Adults:   Take a missed dose as soon as you think about it.  If it is less than 8 hours until your next dose, skip the missed dose and go back to your normal time.  Do not take 2 doses at the same time or extra doses.  Children:   Call your child's doctor to find out what to do.  How do I store and/or throw out this drug?   All products:   Store unopened pens in a refrigerator. Do not freeze.  Do not use if it has been  frozen.  If an unopened container has been stored at room temperature, be sure you know how long you can leave this drug at room temperature before you need to throw it away. If you are not sure, talk with the doctor or pharmacist.  After opening, store in a refrigerator or at room temperature. Protect from heat and light. Throw away any part not used after 8 weeks.  Keep all drugs in a safe place. Keep all drugs out of the reach of children and pets.  Throw away unused or  drugs. Do not flush down a toilet or pour down a drain unless you are told to do so. Check with your pharmacist if you have questions about the best way to throw out drugs. There may be drug take-back programs in your area.  Vials:   Store in the original container to protect from light.  General drug facts   If your symptoms or health problems do not get better or if they become worse, call your doctor.  Do not share your drugs with others and do not take anyone else's drugs.  Some drugs may have another patient information leaflet. If you have any questions about this drug, please talk with your doctor, nurse, pharmacist, or other health care provider.  Some drugs may have another patient information leaflet. Check with your pharmacist. If you have any questions about this drug, please talk with your doctor, nurse, pharmacist, or other health care provider.  If you think there has been an overdose, call your poison control center or get medical care right away. Be ready to tell or show what was taken, how much, and when it happened.  Consumer Information Use and Disclaimer   This generalized information is a limited summary of diagnosis, treatment, and/or medication information. It is not meant to be comprehensive and should be used as a tool to help the user understand and/or assess potential diagnostic and treatment options. It does NOT include all information about conditions, treatments, medications, side effects, or risks that may  apply to a specific patient. It is not intended to be medical advice or a substitute for the medical advice, diagnosis, or treatment of a health care provider based on the health care provider's examination and assessment of a patient's specific and unique circumstances. Patients must speak with a health care provider for complete information about their health, medical questions, and treatment options, including any risks or benefits regarding use of medications. This information does not endorse any treatments or medications as safe, effective, or approved for treating a specific patient. Up[employer], Inc[employer] and its affiliates disclaim any warranty or liability relating to this information or the use thereof. The use of this information is governed by the Terms of Use, available at https://www.wolterskluwer.com/en/know/clinical-effectiveness-terms.  Last Reviewed Date   2022-03-14  Copyright   © 2024 [employer], [employer] and its affiliates and/or licensors. All rights reserved.

## 2025-02-13 NOTE — PROGRESS NOTES
"    New consult Note      CC: Diabetes    History of Present Illness:   69yr female with JOSE diagnosed 2012 as T2DM, HTN, HLD, Hypothyroidism, Osteoporosis, vit D deficiency.    She was originally diagnosed in 2012 with mild hyperglycemia and started on oral agents.       CGM data review::  Device: lynne Dates: 2/10/25 - 2/13/25 Usage: 100 %  Av glu: 261 mg/dL  SD:  mg/dL CV: 20  % GMI:   %  TIR: 19 % TAR: 32+49 %  TBR:  %    Glycemic patters:  severe fasting and pp hyperglycemia.  Hypoglycemia: No    Current meds:  Metformin 1000mg po BID  Jardiance  Trulicity 1.5mg weekly    Opthamology:   Podiatry:   vaccination:   Dental:  Pancreatitis:     Ace/ARB:   Statin:  Thyroid issues:    Osteoporosis: started Risedronate 2023 after last DXA scan      Physical Exam:  Body mass index is 18.19 kg/m².  /72 (BP Location: Right arm, Patient Position: Sitting)   Pulse 79   Temp (!) 97.3 °F (36.3 °C) (Tympanic)   Resp 12   Ht 5' 4\" (1.626 m)   Wt 48.1 kg (106 lb)   SpO2 96%   BMI 18.19 kg/m²    Vitals:    02/13/25 1042   Weight: 48.1 kg (106 lb)        Physical Exam  Constitutional:       General: She is not in acute distress.     Appearance: She is well-developed.   HENT:      Head: Normocephalic and atraumatic.      Nose: Nose normal.   Eyes:      Conjunctiva/sclera: Conjunctivae normal.   Pulmonary:      Effort: Pulmonary effort is normal.   Abdominal:      General: There is no distension.   Musculoskeletal:      Cervical back: Normal range of motion and neck supple.   Skin:     Findings: No rash.      Comments: No icterus   Neurological:      Mental Status: She is alert and oriented to person, place, and time.         Labs:   Lab Results   Component Value Date    HGBA1C 11.1 (H) 02/07/2025       No results found for: \"SVZ9ZATIOGPY\", \"TSH\", \"K8YEOMR\", \"L1IDYSQ\", \"THYROIDAB\"    Lab Results   Component Value Date    CREATININE 0.56 (L) 02/07/2025    BUN 13 02/07/2025    K 5.0 02/07/2025     02/07/2025    CO2 " 26 02/07/2025     eGFR   Date Value Ref Range Status   02/07/2025 94 ml/min/1.73sq m Final       Lab Results   Component Value Date    ALT 19 02/07/2025    AST 15 02/07/2025    ALKPHOS 57 02/07/2025       Lab Results   Component Value Date    CHOLESTEROL 171 02/07/2025     Lab Results   Component Value Date    HDL 52 02/07/2025     Lab Results   Component Value Date    TRIG 106 02/07/2025     Lab Results   Component Value Date    NONHDLC 119 02/07/2025         Assessment/Plan:    1. JOSE (latent autoimmune diabetes of adulthood) ([facility])  Assessment & Plan:  She is uncontrolled.    Today we discussed all aspects of diabetes including pathophysiology, risk factors, complications, SAGM, CGM, diet, lifestyle modifications, medical fitness training, diabetes education, goals of therapy, follow up needs and medications including insulin, metformin, Jardiance and GLP1 agonists.  Advised to maintain goal blood sugars 70-180mg/dL.      We agreed to add insulin to her regimen.  Follow up in 3 months.    Lab Results   Component Value Date    HGBA1C 11.3 (H) 02/14/2025     Orders:  -     Hemoglobin A1C; Future  -     C-peptide; Future  -     IA2 Autoantibodies; Future  -     Anti-islet cell antibody; Future  -     Zinc Transporter 8 (Znt8) Antibody; Future  -     Glutamic acid decarboxylase; Future  -     insulin degludec (Tresiba FlexTouch) 100 units/mL injection pen; Inject 12 Units under the skin daily at bedtime  -     Insulin Pen Needle (BD Pen Needle Evonne U/F) 32G X 4 MM MISC; Use 4 times daily as needed with insulin pen  2. Diabetes mellitus without complication ([facility])  -     Ambulatory Referral to Endocrinology  3. Type 2 diabetes mellitus with hyperglycemia, without long-term current use of insulin ([facility])  4. Age-related osteoporosis without current pathological fracture  Assessment & Plan:  Continue risedronate.    Today we discussed all aspects of osteoporosis including pathophysiology, risk factors, complications, diet,  calcium 1200mg/day, vitamin D supplementation to maintain goal >30ng/dL, lifestyle modifications, medical fitness training, goals of therapy, follow up needs and medications including Alendronate, zolendronate, denosumab, romosozumab, foreo and tymlos.  Orders:  -     PTH, intact; Future  -     Comprehensive metabolic panel; Future  -     Phosphorus; Future  -     DXA bone density spine hip and pelvis; Future; Expected date: 02/13/2025  5. Mixed hyperlipidemia  6. Vitamin D deficiency  -     Vitamin D 25 hydroxy; Future  7. Other specified hypothyroidism  Assessment & Plan:  She seems clinically and biochemically.  Continue levothyroxine 100mcg qdaily and follow up in 3 months with repeat labs.  8. Hypothyroidism due to Hashimoto thyroiditis  -     T4, free; Future  -     TSH, 3rd generation; Future  -     Thyroid Antibodies Panel; Future        I have spent a total time of 45 minutes on 02/13/25 in caring for this patient including greater than 50% of this time was spent in counseling/coordination of care as listed above.       Discussed with the patient and all questioned fully answered. She will contact me with concerns.    Demetrius Curtis

## 2025-02-14 ENCOUNTER — APPOINTMENT (OUTPATIENT)
Dept: LAB | Facility: CLINIC | Age: 70
End: 2025-02-14
Payer: COMMERCIAL

## 2025-02-14 DIAGNOSIS — E06.3 HYPOTHYROIDISM DUE TO HASHIMOTO THYROIDITIS: ICD-10-CM

## 2025-02-14 DIAGNOSIS — M81.0 AGE RELATED OSTEOPOROSIS, UNSPECIFIED PATHOLOGICAL FRACTURE PRESENCE: Primary | ICD-10-CM

## 2025-02-14 DIAGNOSIS — E55.9 VITAMIN D DEFICIENCY: ICD-10-CM

## 2025-02-14 DIAGNOSIS — E13.9 LADA (LATENT AUTOIMMUNE DIABETES OF ADULTHOOD) (HCC): ICD-10-CM

## 2025-02-14 LAB
25(OH)D3 SERPL-MCNC: 82.1 NG/ML (ref 30–100)
ALBUMIN SERPL BCG-MCNC: 4.6 G/DL (ref 3.5–5)
ALP SERPL-CCNC: 58 U/L (ref 34–104)
ALT SERPL W P-5'-P-CCNC: 19 U/L (ref 7–52)
ANION GAP SERPL CALCULATED.3IONS-SCNC: 15 MMOL/L (ref 4–13)
AST SERPL W P-5'-P-CCNC: 15 U/L (ref 13–39)
BILIRUB SERPL-MCNC: 0.6 MG/DL (ref 0.2–1)
BUN SERPL-MCNC: 15 MG/DL (ref 5–25)
CALCIUM SERPL-MCNC: 9.9 MG/DL (ref 8.4–10.2)
CHLORIDE SERPL-SCNC: 98 MMOL/L (ref 96–108)
CO2 SERPL-SCNC: 25 MMOL/L (ref 21–32)
CREAT SERPL-MCNC: 0.57 MG/DL (ref 0.6–1.3)
EST. AVERAGE GLUCOSE BLD GHB EST-MCNC: 278 MG/DL
GFR SERPL CREATININE-BSD FRML MDRD: 93 ML/MIN/1.73SQ M
GLUCOSE P FAST SERPL-MCNC: 166 MG/DL (ref 65–99)
HBA1C MFR BLD: 11.3 %
PHOSPHATE SERPL-MCNC: 4.4 MG/DL (ref 2.3–4.1)
POTASSIUM SERPL-SCNC: 4.2 MMOL/L (ref 3.5–5.3)
PROT SERPL-MCNC: 6.8 G/DL (ref 6.4–8.4)
PTH-INTACT SERPL-MCNC: 24.8 PG/ML (ref 12–88)
SODIUM SERPL-SCNC: 138 MMOL/L (ref 135–147)
T4 FREE SERPL-MCNC: 1.12 NG/DL (ref 0.61–1.12)
TSH SERPL DL<=0.05 MIU/L-ACNC: 1.46 UIU/ML (ref 0.45–4.5)

## 2025-02-14 PROCEDURE — 84443 ASSAY THYROID STIM HORMONE: CPT

## 2025-02-14 PROCEDURE — 86376 MICROSOMAL ANTIBODY EACH: CPT

## 2025-02-14 PROCEDURE — 84439 ASSAY OF FREE THYROXINE: CPT

## 2025-02-14 PROCEDURE — 36415 COLL VENOUS BLD VENIPUNCTURE: CPT

## 2025-02-14 PROCEDURE — 86341 ISLET CELL ANTIBODY: CPT

## 2025-02-14 PROCEDURE — 83036 HEMOGLOBIN GLYCOSYLATED A1C: CPT

## 2025-02-14 PROCEDURE — 82306 VITAMIN D 25 HYDROXY: CPT

## 2025-02-14 PROCEDURE — 84100 ASSAY OF PHOSPHORUS: CPT

## 2025-02-14 PROCEDURE — 83970 ASSAY OF PARATHORMONE: CPT

## 2025-02-14 PROCEDURE — 80053 COMPREHEN METABOLIC PANEL: CPT

## 2025-02-14 PROCEDURE — 84681 ASSAY OF C-PEPTIDE: CPT

## 2025-02-14 PROCEDURE — 86800 THYROGLOBULIN ANTIBODY: CPT

## 2025-02-14 PROCEDURE — 83519 RIA NONANTIBODY: CPT

## 2025-02-15 LAB
C PEPTIDE SERPL-MCNC: 0.9 NG/ML (ref 1.1–4.4)
THYROGLOB AB SERPL-ACNC: 165.2 IU/ML (ref 0–0.9)
THYROPEROXIDASE AB SERPL-ACNC: 38 IU/ML (ref 0–34)

## 2025-02-17 LAB
GAD65 AB SER-ACNC: 235.1 U/ML (ref 0–5)
PANC ISLET CELL AB TITR SER: NEGATIVE {TITER}

## 2025-02-17 NOTE — ASSESSMENT & PLAN NOTE
She seems clinically and biochemically.  Continue levothyroxine 100mcg qdaily and follow up in 3 months with repeat labs.

## 2025-02-17 NOTE — ASSESSMENT & PLAN NOTE
Continue risedronate.    Today we discussed all aspects of osteoporosis including pathophysiology, risk factors, complications, diet, calcium 1200mg/day, vitamin D supplementation to maintain goal >30ng/dL, lifestyle modifications, medical fitness training, goals of therapy, follow up needs and medications including Alendronate, zolendronate, denosumab, romosozumab, foreo and tymlos.

## 2025-02-17 NOTE — ASSESSMENT & PLAN NOTE
She is uncontrolled.    Today we discussed all aspects of diabetes including pathophysiology, risk factors, complications, SAGM, CGM, diet, lifestyle modifications, medical fitness training, diabetes education, goals of therapy, follow up needs and medications including insulin, metformin, Jardiance and GLP1 agonists.  Advised to maintain goal blood sugars 70-180mg/dL.      We agreed to add insulin to her regimen.  Follow up in 3 months.    Lab Results   Component Value Date    HGBA1C 11.3 (H) 02/14/2025

## 2025-02-20 ENCOUNTER — NURSE TRIAGE (OUTPATIENT)
Age: 70
End: 2025-02-20

## 2025-02-20 NOTE — TELEPHONE ENCOUNTER
"Patient called reporting that her blood sugars have been below 55 the past 2 mornings. She denied any symptoms and said she just went back to bed the one morning. I asked her if there is pressure on the sensor and she said yes, it's on the side she sleeps. I informed her that any pressure on the sensor can cause a false low reading. I told her she can always double check with a fingerstick, unless she is having symptoms then she wants to treat it first. She said the rest of the day her sugars are fine, it just happened the last 2 nights. She is going to change the sensor and place it in a different spot. Her sugar currently was 118. I told her if it continues to happen and she has symptoms to give us another call. Patient verbalized understanding. JAY      Reason for Disposition   Low blood sugar prevention, questions about    Additional Information   Negative: Blood glucose 70 mg/dL (3.9 mmol/L) or below, OR symptomatic, AND cause known    Answer Assessment - Initial Assessment Questions  1. SYMPTOMS: \"What symptoms are you concerned about?\"      Low blood sugar  2. ONSET:  \"When did the symptoms start?\"      No symptoms  3. BLOOD GLUCOSE: \"What is your blood glucose level?\"       118  4. USUAL RANGE: \"What is your blood glucose level usually?\" (e.g., usual fasting morning value, usual evening value)      118  5. TYPE 1 or 2:  \"Do you know what type of diabetes you have?\"  (e.g., Type 1, Type 2, Gestational; doesn't know)       Type1  6. INSULIN: \"Do you take insulin?\" \"What type of insulin(s) do you use? What is the mode of delivery? (syringe, pen; injection or pump) \"When did you last give yourself an insulin dose?\" (i.e., time or hours/minutes ago) \"How much did you give?\" (i.e., how many units)      yes  7. DIABETES PILLS: \"Do you take any pills for your diabetes?\" If Yes, ask: \"What is the name of the medicine(s) that you take for high blood sugar?\"      yes  8. OTHER SYMPTOMS: \"Do you have any symptoms?\" " "(e.g., fever, frequent urination, difficulty breathing, vomiting)      denies  9. LOW BLOOD GLUCOSE TREATMENT: \"What have you done so far to treat the low blood glucose level?\"      -  10. FOOD: \"When did you last eat or drink?\"        -  11. ALONE: \"Are you alone right now or is someone with you?\"         -    Protocols used: Diabetes - Low Blood Sugar-Adult-OH    "

## 2025-02-20 NOTE — TELEPHONE ENCOUNTER
Regarding: low blood sugars.  ----- Message from Shanon GRIER sent at 2/20/2025 10:30 AM EST -----  Patient  called  her   blood  sugars  have drop below  50-55. Wednesday  and today.

## 2025-02-21 ENCOUNTER — TELEPHONE (OUTPATIENT)
Dept: ENDOCRINOLOGY | Facility: CLINIC | Age: 70
End: 2025-02-21

## 2025-02-22 LAB — ISLET CELL512 AB SER-ACNC: <7.5 U/ML

## 2025-02-24 ENCOUNTER — TELEPHONE (OUTPATIENT)
Age: 70
End: 2025-02-24

## 2025-02-24 NOTE — TELEPHONE ENCOUNTER
Patient called in to advise she had a sample for Tresiba and the pen needle was 4 mm and she advised when she picked up the actual script, the pen needles seem larger as they are bothering her. I advised the pen needles we ordered were also 4 mm. Patient then noticed they are a different brand and may be that is why. She is going to talk to her pharmacy. If she needs a different script called in, she will advise.

## 2025-02-24 NOTE — TELEPHONE ENCOUNTER
PA for BD Pen Needle Evonne U/F) 32G X 4 MM  NOT REQUIRED     Reason          Patient advised by          [] MyChart Message  [] Phone call   []LMOM  []L/M to call office as no active Communication consent on file  []Unable to leave detailed message as VM not approved on Communication consent       Pharmacy advised by    [x]Fax  []Phone call

## 2025-02-25 NOTE — TELEPHONE ENCOUNTER
Patient called back in regarding her pen needles. She advised she spoke to the pharmacist and they advised her to have NovoFine pen needles 32g by 4mm called in. They will be closer to the ones she had with the sampl that she liked. Patient is asking if we can call this into [facility].

## 2025-02-27 LAB — ZNT8 AB SERPL IA-ACNC: <15 U/ML

## 2025-03-01 ENCOUNTER — RESULTS FOLLOW-UP (OUTPATIENT)
Dept: ENDOCRINOLOGY | Facility: CLINIC | Age: 70
End: 2025-03-01

## 2025-03-01 DIAGNOSIS — E13.9 LADA (LATENT AUTOIMMUNE DIABETES OF ADULTHOOD) (HCC): ICD-10-CM

## 2025-03-01 RX ORDER — PEN NEEDLE, DIABETIC 32GX 5/32"
NEEDLE, DISPOSABLE MISCELLANEOUS
Qty: 200 EACH | Refills: 5 | Status: SHIPPED | OUTPATIENT
Start: 2025-03-01

## 2025-03-07 ENCOUNTER — TELEPHONE (OUTPATIENT)
Age: 70
End: 2025-03-07

## 2025-03-07 NOTE — TELEPHONE ENCOUNTER
pt is going on a trip to [place]. she needs a note for her insulin supplies to carry on her during her travels in the airport.  pt leaving 4/3/25.

## 2025-03-18 ENCOUNTER — PROCEDURE VISIT (OUTPATIENT)
Age: 70
End: 2025-03-18
Payer: COMMERCIAL

## 2025-03-18 VITALS
WEIGHT: 106 LBS | DIASTOLIC BLOOD PRESSURE: 66 MMHG | HEIGHT: 64 IN | BODY MASS INDEX: 18.1 KG/M2 | SYSTOLIC BLOOD PRESSURE: 122 MMHG | HEART RATE: 68 BPM

## 2025-03-18 DIAGNOSIS — M47.816 LUMBAR SPONDYLOSIS: ICD-10-CM

## 2025-03-18 DIAGNOSIS — M99.04 SEGMENTAL DYSFUNCTION OF SACRAL REGION: Primary | ICD-10-CM

## 2025-03-18 DIAGNOSIS — M99.03 SEGMENTAL DYSFUNCTION OF LUMBAR REGION: ICD-10-CM

## 2025-03-18 DIAGNOSIS — M99.02 SEGMENTAL DYSFUNCTION OF THORACIC REGION: ICD-10-CM

## 2025-03-18 PROCEDURE — 98941 CHIROPRACT MANJ 3-4 REGIONS: CPT | Performed by: CHIROPRACTOR

## 2025-03-18 NOTE — PROGRESS NOTES
Initial date of service: 3/14/24    Diagnoses and all orders for this visit:    Segmental dysfunction of sacral region    Segmental dysfunction of lumbar region    Segmental dysfunction of thoracic region    Lumbar spondylosis       ASSESSMENT:   Pt's symptoms and exam findings consistent with lumbar spondylosis with associated segmental dysfunction secondary to repetitive st/sp injury, exacerbated by postural/ergonomic stressors. Pt responded well to flexion biased stretches and manual mobilization of the affected spinal and myofascial tissues with increased ROM; trial of conservative tx recommended consisting of stretching, graded mobilization/manipulation of the affected spinal and myofascial jt dysfunction, postural/ergonomic education and take home stretches/exercises. If symptoms fail to improve with short trial of conservative care, appropriate imaging and referral will be coordinated.  -Improvements in pain and mm spasm in lower back and glutes post-trx, Back to PT just for refresher.  8/6- The pt had good progress. The patient is feeling improvements with treatment.   3/18/25- Flare up with increase pain in the lower back and shoulder, tolerated treatment well with decrease pain and mm spasm,       PROCEDURE CODES: 24258-JD    TREATMENT:  Fear avoidance behavior discussion; encouraged and reassured pt that natural course of condition is to improve over time with adherence to tx plan and home care strategies. Home care recommendations: avoid bed rest, walk (but avoid trails and uneven surfaces), gradual return to activity to tolerance (avoid anything that peripheralizes symptoms), call if symptoms peripheralize, worsen, or neurologic deficit progresses. Ther-ex: IASTM; discussed post procedure soreness and/or ecchymosis for up to 36 hrs, applied to affected mm hypertonicities; supine hamstring stretch, supine gluteal stretch, side laying QL stretch, single knee to chest stretch, hip flexor pin-and-stretch,  alternating prone hip extension, glute bridge, transitional mvmt education, abdominal bracing; greater than 15 min spent performing above mentioned ther-ex to improve ROM/flexibility. Thoracic mobilization/manipulation: prone P-A mob, Lumbar mobilization/manipulation: diversified side laying graded HVLA, flexion-traction; SIJ Manipulation/Mobilization: R/L SIJ HVLA - long axis distraction, dobbs drop table maneuver to affected SIJ    HPI:  Lydia Patel is a 69 y.o. female  Chief Complaint   Patient presents with   • Left Shoulder - Follow-up     Left shoulder is very sore . Pain score 8-9       • Back Pain     Lower lumbar is sharp and shooting when getting up or rolling in bed. Pain score 8-9       The patient presents to the office with lower back pain that started in November when picking up a heavy planter, the patient has images and course of PT which helped 66% of the pain but still has on spot that is an issue in the morning. Getting on cough and once finished coffee pain is better. The patient has no pain in to the extremities. Continues stretches and exercises from PT that ended 2 wks ago. She has had Chiropractic.   4/4- The patient reports she has improvements in the pain, the patient mentions its still there.    4/16- The patient is feeling better overall and had two good mornings.   4/285- The patient pain as last visit but mentions no zinger type pain.  5/2- The patient is feeling the same.   5/9- The patient is feeling a little better overall, doing well with exercises with the hips.  6/4- The ps is feeling a little better overall 0/10  7/9-The patient is feeling much better  8/6- The patient is better overall, she is on a treadmill and starting PT.   3/18/25- The patient reports worsening pain after starting back at the gym 2 wks ago. L shoulder 8-9/10. and lower back increased pain 8-9/10.     Back Pain      Past Medical History:   Diagnosis Date   • Anxiety disorder    • Diabetes mellitus (HCC)     • Disease of thyroid gland    • GERD (gastroesophageal reflux disease)    • Hyperlipidemia    • Osteoporosis       Past Surgical History:   Procedure Laterality Date   •  SECTION     • HEMORRHOID SURGERY       The following portions of the patient's history were reviewed and updated as appropriate: allergies, past family history, past medical history, past social history, past surgical history, and problem list.  Review of Systems   Musculoskeletal:  Positive for back pain.     Physical Exam  Musculoskeletal:         General: Tenderness present.      Thoracic back: Spasms and tenderness present. Decreased range of motion.      Lumbar back: Spasms and tenderness present. No swelling, edema, deformity, signs of trauma or lacerations. Decreased range of motion.        Back:    Neurological:      Gait: Gait is intact.      Deep Tendon Reflexes: Reflexes are normal and symmetric.       SOFT TISSUE ASSESSMENT Hypertonicity and tenderness palpated B T10-S1 erector spinae, hip flexor, glute med/min, QL, hamstring JOINT RESTRICTIONS: T10-S1 and L SIJ ORTHO: SI jt point tenderness: +; Iliana unremarkable for centralization/peripheralization; jerry's, iliac compression, thigh thrust elicit lbp in L SIJ; prone femoral nerve stretch neg for upper lumbar neural tension, elicits R/L SIJ stiffness; sitting root elicits no lbp on R/L; slump test elicits no neural tension R/L    Return in about 1 week (around 3/25/2025) for Recheck.

## 2025-03-27 ENCOUNTER — PROCEDURE VISIT (OUTPATIENT)
Age: 70
End: 2025-03-27
Payer: COMMERCIAL

## 2025-03-27 VITALS
SYSTOLIC BLOOD PRESSURE: 133 MMHG | BODY MASS INDEX: 18.1 KG/M2 | WEIGHT: 106 LBS | DIASTOLIC BLOOD PRESSURE: 74 MMHG | HEIGHT: 64 IN

## 2025-03-27 DIAGNOSIS — M99.03 SEGMENTAL DYSFUNCTION OF LUMBAR REGION: ICD-10-CM

## 2025-03-27 DIAGNOSIS — M47.816 LUMBAR SPONDYLOSIS: ICD-10-CM

## 2025-03-27 DIAGNOSIS — M25.512 LEFT SHOULDER PAIN, UNSPECIFIED CHRONICITY: ICD-10-CM

## 2025-03-27 DIAGNOSIS — M99.02 SEGMENTAL DYSFUNCTION OF THORACIC REGION: ICD-10-CM

## 2025-03-27 DIAGNOSIS — M99.04 SEGMENTAL DYSFUNCTION OF SACRAL REGION: Primary | ICD-10-CM

## 2025-03-27 PROCEDURE — 98941 CHIROPRACT MANJ 3-4 REGIONS: CPT | Performed by: CHIROPRACTOR

## 2025-03-27 NOTE — PROGRESS NOTES
Initial date of service: 3/14/24    Diagnoses and all orders for this visit:    Segmental dysfunction of sacral region    Segmental dysfunction of lumbar region    Segmental dysfunction of thoracic region    Lumbar spondylosis    Left shoulder pain, unspecified chronicity  -     Ambulatory Referral to Orthopedic Surgery; Future  -     Ambulatory Referral to Physical Therapy; Future       ASSESSMENT:   Pt's symptoms and exam findings consistent with lumbar spondylosis with associated segmental dysfunction secondary to repetitive st/sp injury, exacerbated by postural/ergonomic stressors. Pt responded well to flexion biased stretches and manual mobilization of the affected spinal and myofascial tissues with increased ROM; trial of conservative tx recommended consisting of stretching, graded mobilization/manipulation of the affected spinal and myofascial jt dysfunction, postural/ergonomic education and take home stretches/exercises. If symptoms fail to improve with short trial of conservative care, appropriate imaging and referral will be coordinated.  -Improvements in pain and mm spasm in lower back and glutes post-trx, Back to PT just for refresher.  8/6- The pt had good progress. The patient is feeling improvements with treatment.   3/18/25- Flare up with increase pain in the lower back and shoulder, tolerated treatment well with decrease pain and mm spasm,   3/27/25- The patient is making good progress with lower back pain but continued pain with shoulder. Pt referral to Ortho and PT.       PROCEDURE CODES: 39315-FP    TREATMENT:  Fear avoidance behavior discussion; encouraged and reassured pt that natural course of condition is to improve over time with adherence to tx plan and home care strategies. Home care recommendations: avoid bed rest, walk (but avoid trails and uneven surfaces), gradual return to activity to tolerance (avoid anything that peripheralizes symptoms), call if symptoms peripheralize, worsen, or  neurologic deficit progresses. Ther-ex: IASTM; discussed post procedure soreness and/or ecchymosis for up to 36 hrs, applied to affected mm hypertonicities; supine hamstring stretch, supine gluteal stretch, side laying QL stretch, single knee to chest stretch, hip flexor pin-and-stretch, alternating prone hip extension, glute bridge, transitional mvmt education, abdominal bracing; greater than 15 min spent performing above mentioned ther-ex to improve ROM/flexibility. Thoracic mobilization/manipulation: prone P-A mob, Lumbar mobilization/manipulation: diversified side laying graded HVLA, flexion-traction; SIJ Manipulation/Mobilization: R/L SIJ HVLA - long axis distraction, dobbs drop table maneuver to affected SIJ    HPI:  Lydia Patel is a 69 y.o. female  Chief Complaint   Patient presents with   • Back Pain     Low back feeling much better    • Arm Pain     Limited ROM about a 9 left      The patient presents to the office with lower back pain that started in November when picking up a heavy planter, the patient has images and course of PT which helped 66% of the pain but still has on spot that is an issue in the morning. Getting on cough and once finished coffee pain is better. The patient has no pain in to the extremities. Continues stretches and exercises from PT that ended 2 wks ago. She has had Chiropractic.   4/4- The patient reports she has improvements in the pain, the patient mentions its still there.    4/16- The patient is feeling better overall and had two good mornings.   4/285- The patient pain as last visit but mentions no zinger type pain.  5/2- The patient is feeling the same.   5/9- The patient is feeling a little better overall, doing well with exercises with the hips.  6/4- The ps is feeling a little better overall 0/10  7/9-The patient is feeling much better  8/6- The patient is better overall, she is on a treadmill and starting PT.   3/18/25- The patient reports worsening pain after  starting back at the gym 2 wks ago. L shoulder 8-/10. and lower back increased pain 8-9/10.   3/27/25- The patient reports lower back is feeling better but the shoulder is worse pain.     Back Pain    Arm Pain       Past Medical History:   Diagnosis Date   • Anxiety disorder    • Diabetes mellitus (HCC)    • Disease of thyroid gland    • GERD (gastroesophageal reflux disease)    • Hyperlipidemia    • Osteoporosis       Past Surgical History:   Procedure Laterality Date   •  SECTION     • HEMORRHOID SURGERY       The following portions of the patient's history were reviewed and updated as appropriate: allergies, past family history, past medical history, past social history, past surgical history, and problem list.  Review of Systems   Musculoskeletal:  Positive for back pain.     Physical Exam  Musculoskeletal:         General: Tenderness present.      Thoracic back: Spasms and tenderness present. Decreased range of motion.      Lumbar back: Spasms and tenderness present. No swelling, edema, deformity, signs of trauma or lacerations. Decreased range of motion.        Back:    Neurological:      Gait: Gait is intact.      Deep Tendon Reflexes: Reflexes are normal and symmetric.       SOFT TISSUE ASSESSMENT Hypertonicity and tenderness palpated B T10-S1 erector spinae, hip flexor, glute med/min, QL, hamstring JOINT RESTRICTIONS: T10-S1 and L SIJ ORTHO: SI jt point tenderness: +; Iliana unremarkable for centralization/peripheralization; jerry's, iliac compression, thigh thrust elicit lbp in L SIJ; prone femoral nerve stretch neg for upper lumbar neural tension, elicits R/L SIJ stiffness; sitting root elicits no lbp on R/L; slump test elicits no neural tension R/L    Return in about 3 weeks (around 2025) for Recheck.

## 2025-03-31 ENCOUNTER — OFFICE VISIT (OUTPATIENT)
Dept: OBGYN CLINIC | Facility: CLINIC | Age: 70
End: 2025-03-31
Payer: COMMERCIAL

## 2025-03-31 ENCOUNTER — APPOINTMENT (OUTPATIENT)
Dept: RADIOLOGY | Facility: AMBULARY SURGERY CENTER | Age: 70
End: 2025-03-31
Payer: COMMERCIAL

## 2025-03-31 VITALS — HEIGHT: 64 IN | WEIGHT: 106 LBS | BODY MASS INDEX: 18.1 KG/M2

## 2025-03-31 DIAGNOSIS — M25.512 LEFT SHOULDER PAIN, UNSPECIFIED CHRONICITY: ICD-10-CM

## 2025-03-31 DIAGNOSIS — G89.29 CHRONIC LEFT SHOULDER PAIN: Primary | ICD-10-CM

## 2025-03-31 DIAGNOSIS — M25.512 CHRONIC LEFT SHOULDER PAIN: Primary | ICD-10-CM

## 2025-03-31 PROCEDURE — 73030 X-RAY EXAM OF SHOULDER: CPT

## 2025-03-31 PROCEDURE — 20610 DRAIN/INJ JOINT/BURSA W/O US: CPT | Performed by: PHYSICAL MEDICINE & REHABILITATION

## 2025-03-31 PROCEDURE — 99204 OFFICE O/P NEW MOD 45 MIN: CPT | Performed by: PHYSICAL MEDICINE & REHABILITATION

## 2025-03-31 RX ORDER — KETOROLAC TROMETHAMINE 30 MG/ML
30 INJECTION, SOLUTION INTRAMUSCULAR; INTRAVENOUS
Status: COMPLETED | OUTPATIENT
Start: 2025-03-31 | End: 2025-03-31

## 2025-03-31 RX ORDER — ROPIVACAINE HYDROCHLORIDE 5 MG/ML
10 INJECTION, SOLUTION EPIDURAL; INFILTRATION; PERINEURAL
Status: COMPLETED | OUTPATIENT
Start: 2025-03-31 | End: 2025-03-31

## 2025-03-31 RX ORDER — DULAGLUTIDE 1.5 MG/.5ML
INJECTION, SOLUTION SUBCUTANEOUS
COMMUNITY
Start: 2025-03-29

## 2025-03-31 RX ADMIN — KETOROLAC TROMETHAMINE 30 MG: 30 INJECTION, SOLUTION INTRAMUSCULAR; INTRAVENOUS at 13:15

## 2025-03-31 RX ADMIN — ROPIVACAINE HYDROCHLORIDE 10 ML: 5 INJECTION, SOLUTION EPIDURAL; INFILTRATION; PERINEURAL at 13:15

## 2025-03-31 NOTE — PROGRESS NOTES
1. Chronic left shoulder pain      Orders Placed This Encounter   Procedures    Large joint arthrocentesis    XR shoulder 2+ vw left     New Medications Ordered This Visit   Medications    Trulicity 1.5 MG/0.5ML SOAJ       Impression:  Left shoulder pain likely secondary to calcific tendinitis.  We discussed different treatment options and decided to proceed with a subacromial space ketorolac injection.  We did not use steroid as her latest hemoglobin A1c was over 11.  She would benefit from formal physical therapy.  I will see her back in 3-4 weeks if needed.  She is going away to [place].    Imaging Studies (I personally reviewed images in PACS and report):  Left shoulder x-rays most recent to this encounter reviewed.  These images show minimal glenohumeral joint osteoarthritis with a rounded radiodensities that most likely represent calcification in the rotator cuff interval.    No follow-ups on file.    Patient is in agreement with the above plan.    HPI:  Lydia Patel is a 69 y.o. female  who presents for evaluation of   Chief Complaint   Patient presents with    Left Shoulder - Pain       Onset/Mechanism: Pain for the past few weeks without an injury.  She might have done the bands to hard.  Location: Anterior shoulder.  Radiation: Down the arm.  Provocative: Sleeping on that side.  Severity: Painful.  Associated Symptoms: Denies.  Treatment so far: No recent treatment.    Following history reviewed and updated:  Past Medical History:   Diagnosis Date    Anxiety disorder     Diabetes mellitus (HCC)     Disease of thyroid gland     GERD (gastroesophageal reflux disease)     Hyperlipidemia     Osteoporosis      Past Surgical History:   Procedure Laterality Date     SECTION      HEMORRHOID SURGERY       Social History   Social History     Substance and Sexual Activity   Alcohol Use Yes    Alcohol/week: 2.0 standard drinks of alcohol    Types: 2 Glasses of wine per week     Social History  "    Substance and Sexual Activity   Drug Use Never     Social History     Tobacco Use   Smoking Status Former    Current packs/day: 0.00    Average packs/day: 0.3 packs/day for 5.0 years (1.3 ttl pk-yrs)    Types: Cigarettes    Start date: 1970    Quit date: 1974    Years since quittin.8    Passive exposure: Past   Smokeless Tobacco Never     Family History   Problem Relation Age of Onset    Cancer Mother         skin    Stroke Mother     Hypertension Mother     Hyperlipidemia Mother     Osteoporosis Mother     Hypertension Father     Cancer Father     Lung cancer Father         mesothelioma    Depression Sister     Thyroid disease Sister     Neurological problems Sister         essential tremor    Stomach cancer Maternal Grandmother     Heart attack Maternal Grandfather     Heart attack Paternal Grandmother     Pneumonia Paternal Grandfather     Completed Suicide  Paternal Uncle 80     Allergies   Allergen Reactions    Bacitracin-Neomycin-Polymyxin Rash    Anatoliy-Bacit-Poly-Lidocaine Itching        Constitutional:  Ht 5' 4.02\" (1.626 m)   Wt 48.1 kg (106 lb)   BMI 18.19 kg/m²    General: NAD.  Eyes: Anicteric sclerae.  Neck: Supple.  Lungs: Unlabored breathing.  Cardiovascular: No lower extremity edema.  Skin: Intact without erythema.  Neurologic: Sensation intact to light touch.  Psychiatric: Mood and affect are appropriate.    Left Shoulder Exam     Tenderness   The patient is experiencing tenderness in the acromion.    Range of Motion   Forward flexion:  abnormal   Internal rotation 0 degrees:  abnormal     Tests   Tian test: positive  Impingement: positive    Other   Erythema: absent  Scars: absent  Sensation: normal  Pulse: present              Large joint arthrocentesis: L subacromial bursa  [place] Protocol:  procedure performed by consultantConsent: Verbal consent obtained. Written consent not obtained.  Risks and benefits: risks, benefits and alternatives were discussed  Consent given by: " patient  Timeout called at: 3/31/2025 1:02 PM.  Patient understanding: patient states understanding of the procedure being performed  Site marked: the operative site was marked  Patient identity confirmed: verbally with patient  Supporting Documentation  Indications: pain   Procedure Details  Location: shoulder - L subacromial bursa  Needle gauge: 21G 2''  Ultrasound guidance: no  Approach: posterolateral  Medications administered: 10 mL ropivacaine 0.5 %; 30 mg ketorolac 30 mg/mL    Patient tolerance: patient tolerated the procedure well with no immediate complications  Dressing:  Sterile dressing applied    There was little to no resistance encountered during the injection.    Risks of this procedure include:    - Risk of bleeding since a needle is involved.  - Risk of infection (1/10,000 chance as per recent studies).  Signs/symptoms were discussed and they would prompt an urgent evaluation at an emergency department.  - Risk of pigmentation or skin dimpling in the skin (2-3% chance as per recent studies) from the steroid.  - Risk of increased pain from steroid flare (1% chance as per recent studies) that typically lasts 24-48 hours.  - Risk of increased blood sugars from the steroid medication that can last for a few weeks.  If the patient is a diabetic or pre-diabetic, they were encouraged to closely monitor their blood sugars and discuss with PCP if elevated more than usual or if having symptoms.    The benefits outweigh the risks and so the procedure was completed.

## 2025-04-01 ENCOUNTER — PROCEDURE VISIT (OUTPATIENT)
Age: 70
End: 2025-04-01
Payer: COMMERCIAL

## 2025-04-01 VITALS
WEIGHT: 106 LBS | HEIGHT: 64 IN | SYSTOLIC BLOOD PRESSURE: 128 MMHG | BODY MASS INDEX: 18.1 KG/M2 | DIASTOLIC BLOOD PRESSURE: 62 MMHG | HEART RATE: 70 BPM

## 2025-04-01 DIAGNOSIS — M25.512 LEFT SHOULDER PAIN, UNSPECIFIED CHRONICITY: ICD-10-CM

## 2025-04-01 DIAGNOSIS — M99.04 SEGMENTAL DYSFUNCTION OF SACRAL REGION: Primary | ICD-10-CM

## 2025-04-01 DIAGNOSIS — M47.816 LUMBAR SPONDYLOSIS: ICD-10-CM

## 2025-04-01 DIAGNOSIS — M99.03 SEGMENTAL DYSFUNCTION OF LUMBAR REGION: ICD-10-CM

## 2025-04-01 DIAGNOSIS — M99.02 SEGMENTAL DYSFUNCTION OF THORACIC REGION: ICD-10-CM

## 2025-04-01 PROCEDURE — 98941 CHIROPRACT MANJ 3-4 REGIONS: CPT | Performed by: CHIROPRACTOR

## 2025-04-01 NOTE — PROGRESS NOTES
Initial date of service: 3/14/24    Diagnoses and all orders for this visit:    Segmental dysfunction of sacral region    Segmental dysfunction of lumbar region    Segmental dysfunction of thoracic region    Lumbar spondylosis    Left shoulder pain, unspecified chronicity       ASSESSMENT:   Pt's symptoms and exam findings consistent with lumbar spondylosis with associated segmental dysfunction secondary to repetitive st/sp injury, exacerbated by postural/ergonomic stressors. Pt responded well to flexion biased stretches and manual mobilization of the affected spinal and myofascial tissues with increased ROM; trial of conservative tx recommended consisting of stretching, graded mobilization/manipulation of the affected spinal and myofascial jt dysfunction, postural/ergonomic education and take home stretches/exercises. If symptoms fail to improve with short trial of conservative care, appropriate imaging and referral will be coordinated.  -Improvements in pain and mm spasm in lower back and glutes post-trx, Back to PT just for refresher.  8/6- The pt had good progress. The patient is feeling improvements with treatment.   3/18/25- Flare up with increase pain in the lower back and shoulder, tolerated treatment well with decrease pain and mm spasm,   3/27/25- The patient is making good progress with lower back pain but continued pain with shoulder. Pt referral to Ortho and PT.   4/1/25- The patient has improvements with Ortho injection, and will be starting PT after vacation.     PROCEDURE CODES: 30135-JE    TREATMENT:  Fear avoidance behavior discussion; encouraged and reassured pt that natural course of condition is to improve over time with adherence to tx plan and home care strategies. Home care recommendations: avoid bed rest, walk (but avoid trails and uneven surfaces), gradual return to activity to tolerance (avoid anything that peripheralizes symptoms), call if symptoms peripheralize, worsen, or neurologic  deficit progresses. Ther-ex: IASTM; discussed post procedure soreness and/or ecchymosis for up to 36 hrs, applied to affected mm hypertonicities; supine hamstring stretch, supine gluteal stretch, side laying QL stretch, single knee to chest stretch, hip flexor pin-and-stretch, alternating prone hip extension, glute bridge, transitional mvmt education, abdominal bracing; greater than 15 min spent performing above mentioned ther-ex to improve ROM/flexibility. Thoracic mobilization/manipulation: prone P-A mob, Lumbar mobilization/manipulation: diversified side laying graded HVLA, flexion-traction; SIJ Manipulation/Mobilization: R/L SIJ HVLA - long axis distraction, dobbs drop table maneuver to affected SIJ    HPI:  Lydia Patel is a 69 y.o. female  Chief Complaint   Patient presents with   • Right Shoulder - Follow-up     Right shoulder is feeling better.   Pain score 2     • Back Pain     Lower lumbar is feeling better.   Pain score 2        The patient presents to the office with lower back pain that started in November when picking up a heavy planter, the patient has images and course of PT which helped 66% of the pain but still has on spot that is an issue in the morning. Getting on cough and once finished coffee pain is better. The patient has no pain in to the extremities. Continues stretches and exercises from PT that ended 2 wks ago. She has had Chiropractic.   4/4- The patient reports she has improvements in the pain, the patient mentions its still there.    4/16- The patient is feeling better overall and had two good mornings.   4/285- The patient pain as last visit but mentions no zinger type pain.  5/2- The patient is feeling the same.   5/9- The patient is feeling a little better overall, doing well with exercises with the hips.  6/4- The ps is feeling a little better overall 0/10  7/9-The patient is feeling much better  8/6- The patient is better overall, she is on a treadmill and starting PT.    3/18/25- The patient reports worsening pain after starting back at the gym 2 wks ago. L shoulder 8-9/10. and lower back increased pain 8-9/10.   3/27/25- The patient reports lower back is feeling better but the shoulder is worse pain.  25- The patient reports she has a NSAID shot into the shoulder which helped shoulder pain a lot, the patient was not able to get steroid injection bc sugars were too high.  2/10 in shoulder and lower back pain.     Back Pain    Arm Pain       Past Medical History:   Diagnosis Date   • Anxiety disorder    • Diabetes mellitus (HCC)    • Disease of thyroid gland    • GERD (gastroesophageal reflux disease)    • Hyperlipidemia    • Osteoporosis       Past Surgical History:   Procedure Laterality Date   •  SECTION     • HEMORRHOID SURGERY       The following portions of the patient's history were reviewed and updated as appropriate: allergies, past family history, past medical history, past social history, past surgical history, and problem list.  Review of Systems   Musculoskeletal:  Positive for back pain.     Physical Exam  Musculoskeletal:         General: Tenderness present.      Thoracic back: Spasms and tenderness present. Decreased range of motion.      Lumbar back: Spasms and tenderness present. No swelling, edema, deformity, signs of trauma or lacerations. Decreased range of motion.        Back:    Neurological:      Gait: Gait is intact.      Deep Tendon Reflexes: Reflexes are normal and symmetric.       SOFT TISSUE ASSESSMENT Hypertonicity and tenderness palpated B T10-S1 erector spinae, hip flexor, glute med/min, QL, hamstring JOINT RESTRICTIONS: T10-S1 and L SIJ ORTHO: SI jt point tenderness: +; Iliana unremarkable for centralization/peripheralization; jerry's, iliac compression, thigh thrust elicit lbp in L SIJ; prone femoral nerve stretch neg for upper lumbar neural tension, elicits R/L SIJ stiffness; sitting root elicits no lbp on R/L;  slump test elicits no neural tension R/L    Return in about 1 week (around 4/8/2025) for Recheck.

## 2025-04-15 NOTE — PROGRESS NOTES
PT Evaluation     Today's date: 2025  Patient name: Lydia Patel  : 1955  MRN: [id number]  Referring provider: Florecita Murguia DC  Dx:   Encounter Diagnosis     ICD-10-CM    1. Left shoulder pain, unspecified chronicity  M25.512 Ambulatory Referral to Physical Therapy          Start Time: 1025  Stop Time: 1100  Total time in clinic (min): 35 minutes    Assessment  Impairments: abnormal or restricted ROM, abnormal movement, activity intolerance, impaired physical strength, lacks appropriate home exercise program, pain with function, poor posture , poor body mechanics, participation limitations, activity limitations and endurance    Assessment details: Lydia Patel is a 69 y.o. female who presents with signs and symptoms consistent of shoulder tendonitis. Patient presents with pain, decreased strength, decreased ROM, and postural dysfunction. Due to these impairments, Patient has difficulty performing lifting, reaching , pushing/pulling, gardening , and cleaning/cooking . Patient would benefit from skilled physical therapy to address the impairments, improve their level of function, and to improve their overall quality of life. Reviewed HEP, involved anatomy, physio as well as POC with verbalized understanding and pt is in agreement with above.     Goals  Short Term Goals: to be achieved by 4 weeks  1) Patient to be independent with basic HEP  2) Decrease pain to 2/10 at its worst  3) Increase shoulder ROM by 5-10 degrees in all planes  4) Increase shoulder strength by 1/2 MMT grade in all deficient planes    Long Term Goals: to be achieved by discharge  1) FOTO equal to or greater than projected goal.  2) Patient to be independent with comprehensive HEP  3) Patient will demonstrate maximal over head reaching  4) Increase UE strength to 5/5 MMT grade in all planes to improve a/iadls  5) Patient to report no sleep interruption secondary to pain       Plan  Patient would benefit from: skilled physical  therapy    Planned therapy interventions: abdominal trunk stabilization, ADL training, body mechanics training, home exercise program, functional ROM exercises, flexibility, therapeutic exercise, therapeutic activities, stretching, strengthening, joint mobilization, manual therapy, neuromuscular re-education, patient education, postural training, IASTM, kinesiology taping, massage and Eisenberg taping    Frequency: 2x week  Duration in weeks: 8  Treatment plan discussed with: patient      Subjective Evaluation    History of Present Illness  Mechanism of injury: History: Pt reports she's feeling like both shoulders have been bothering her. She got an injection by Dr Fermin and notes that it did feel better- not gone but felt better. She notes though that she was taken off of a medication and now feels a lot more achy in all of her joints not just her shoulders. Her L shoulder will wake her up at night with aching pain. Reaching up is very painful. Happens on both sides but more just the L vs R. She also just went on a trip and had to carry suitcases and that aggravated the shoulder.     Aggravating factors: reaching OH, sleeping on the shoulder  Relieving factors: heat  Imaging: x-ray  Occupation: retired  Status: retired  Hobbies/recreation: gym workout  Patient Goals  Patient goals for therapy: decreased pain, increased strength and increased motion    Pain  Current pain ratin  At best pain ratin  At worst pain ratin  Quality: dull ache  Relieving factors: heat and medications  Aggravating factors: lifting and overhead activity    Social Support  Lives with: alone    Employment status: not working    Diagnostic Tests  No diagnostic tests performed  Treatments  Previous treatment: injection treatment  Current treatment: physical therapy      Objective     Postural Observations  Seated posture: fair  Standing posture: fair      Active Range of Motion   Left Shoulder   Flexion: 82 degrees   Abduction: 80  "degrees   External rotation BTH: with pain  Internal rotation BTB: T10     Right Shoulder   Flexion: 90 degrees   Abduction: 105 degrees   External rotation BTH: C3 with pain  Internal rotation BTB: T10     Additional Active Range of Motion Details  Unable to reach BTH with the LUE    Passive Range of Motion   Left Shoulder   Flexion: 165 degrees   External rotation 90°: 80 degrees   Internal rotation 90°: 60 degrees     Right Shoulder   Flexion: 170 degrees   External rotation 90°: 80 degrees   Internal rotation 90°: 55 degrees     Strength/Myotome Testing     Left Shoulder     Planes of Motion   Flexion: 3-   Abduction: 3-   External rotation at 0°: 3+   Internal rotation at 0°: 4     Right Shoulder     Planes of Motion   Flexion: 3   Extension: 3   External rotation at 0°: 4-   Internal rotation at 0°: 4              Precautions: DM      Manuals 4/16            PROM RE                                                   Neuro Re-Ed             Scap squeeze 3\"X20            RTC iso                                                                              Ther Ex             AAROM flex Cane 5\"X10            Table slides 5\"X10            pulleys                                                                              Ther Activity             Pt edu: involved anatomy, physio, HEP, POC, movement patterns  RE                         Gait Training                                       Modalities                                            "

## 2025-04-16 ENCOUNTER — EVALUATION (OUTPATIENT)
Dept: PHYSICAL THERAPY | Facility: CLINIC | Age: 70
End: 2025-04-16
Payer: COMMERCIAL

## 2025-04-16 DIAGNOSIS — M25.512 LEFT SHOULDER PAIN, UNSPECIFIED CHRONICITY: ICD-10-CM

## 2025-04-16 DIAGNOSIS — G89.29 CHRONIC LEFT SHOULDER PAIN: Primary | ICD-10-CM

## 2025-04-16 DIAGNOSIS — M25.512 CHRONIC LEFT SHOULDER PAIN: Primary | ICD-10-CM

## 2025-04-16 PROCEDURE — 97140 MANUAL THERAPY 1/> REGIONS: CPT

## 2025-04-16 PROCEDURE — 97161 PT EVAL LOW COMPLEX 20 MIN: CPT

## 2025-04-16 PROCEDURE — 97110 THERAPEUTIC EXERCISES: CPT

## 2025-04-16 RX ORDER — METHOCARBAMOL 500 MG/1
500 TABLET, FILM COATED ORAL
Qty: 30 TABLET | Refills: 0 | Status: SHIPPED | OUTPATIENT
Start: 2025-04-16

## 2025-04-16 NOTE — HOME EXERCISE EDUCATION
Program_ID:[id number]   Access Code: [email or web address]  URL: [email or web address]  Date: 04-  Prepared By: Martha Naylor    Program Notes      Exercises      - Seated Scapular Retraction - 1 x daily - 3-7 x weekly - 3 sets - 10 reps - 3 seconds hold      - Supine Shoulder Flexion Extension AAROM with Dowel - 1 x daily - 3-7 x weekly - 1 sets - 10 reps - 10 seconds hold      - Seated Shoulder Flexion Towel Slide at Table Top - 1 x daily - 3-7 x weekly - 1 sets - 10 reps - 10 seconds hold

## 2025-04-22 ENCOUNTER — OFFICE VISIT (OUTPATIENT)
Dept: PHYSICAL THERAPY | Facility: CLINIC | Age: 70
End: 2025-04-22
Payer: COMMERCIAL

## 2025-04-22 DIAGNOSIS — M25.512 LEFT SHOULDER PAIN, UNSPECIFIED CHRONICITY: Primary | ICD-10-CM

## 2025-04-22 PROCEDURE — 97140 MANUAL THERAPY 1/> REGIONS: CPT

## 2025-04-22 PROCEDURE — 97110 THERAPEUTIC EXERCISES: CPT

## 2025-04-22 PROCEDURE — 97112 NEUROMUSCULAR REEDUCATION: CPT

## 2025-04-22 NOTE — PROGRESS NOTES
"Daily Note     Today's date: 2025  Patient name: Lydia Patel  : 1955  MRN: [id number]  Referring provider: Florecita Murguia DC  Dx:   Encounter Diagnosis     ICD-10-CM    1. Left shoulder pain, unspecified chronicity  M25.512           Start Time: 1145  Stop Time: 1233  Total time in clinic (min): 48 minutes    Subjective: Pt reports her exercises are going well and that she's been able to close the car door a little easier.      Objective: See treatment diary below      Assessment: Tolerated treatment well. Patient demonstrated fatigue post treatment, exhibited good technique with therapeutic exercises, and would benefit from continued PT. Pt responded really well to AAROM exercises today and noted feeling less overall pain by the end of the session. She was also able to double her AROM for her R shoulder with forward flexion and her L shoulder was able to reach 90 deg of flexion prior to discomfort onset. Progressed HEP to include AAROM with cane for ER and ABD.      Plan: Continue per plan of care.  Progress treatment as tolerated.       Precautions: DM      Manuals            PROM RE RE                                                  Neuro Re-Ed             Scap squeeze 3\"X20 3\"x20           RTC iso (flex, abd, IR/ER)  5\"x10                                                                            Ther Ex             AAROM flex Cane 5\"X10 Cane 5\"X10           Table slides 5\"X10 5\"x10           pulleys  4'           AAROM ER  Cane 5\"x10           AAROM abd  Cane 5\"x10                                                  Ther Activity             Pt edu: involved anatomy, physio, HEP, POC, movement patterns  RE                         Gait Training                                       Modalities                                            "

## 2025-04-22 NOTE — HOME EXERCISE EDUCATION
Program_ID:[id number]   Access Code: [email or web address]  URL: [email or web address]  Date: 04-  Prepared By: Martha Naylor    Program Notes      Exercises      - Seated Scapular Retraction - 1 x daily - 3-7 x weekly - 3 sets - 10 reps - 3 seconds hold      - Supine Shoulder Flexion Extension AAROM with Dowel - 1 x daily - 3-7 x weekly - 1 sets - 10 reps - 10 seconds hold      - Seated Shoulder Flexion Towel Slide at Table Top - 1 x daily - 3-7 x weekly - 1 sets - 10 reps - 10 seconds hold      - Standing Shoulder External Rotation AAROM with Dowel - 1 x daily - 3-7 x weekly - 1 sets - 10 reps - 5 seconds hold      - Standing Shoulder Abduction AAROM with Dowel - 1 x daily - 3-7 x weekly - 1 sets - 10 reps - 5 seconds hold

## 2025-04-24 ENCOUNTER — OFFICE VISIT (OUTPATIENT)
Dept: ENDOCRINOLOGY | Facility: CLINIC | Age: 70
End: 2025-04-24
Payer: COMMERCIAL

## 2025-04-24 VITALS
DIASTOLIC BLOOD PRESSURE: 72 MMHG | OXYGEN SATURATION: 98 % | SYSTOLIC BLOOD PRESSURE: 126 MMHG | WEIGHT: 119 LBS | TEMPERATURE: 98.3 F | BODY MASS INDEX: 20.32 KG/M2 | HEIGHT: 64 IN | HEART RATE: 82 BPM

## 2025-04-24 DIAGNOSIS — E78.2 MIXED HYPERLIPIDEMIA: ICD-10-CM

## 2025-04-24 DIAGNOSIS — E13.9 LADA (LATENT AUTOIMMUNE DIABETES OF ADULTHOOD) (HCC): Primary | ICD-10-CM

## 2025-04-24 DIAGNOSIS — E03.8 OTHER SPECIFIED HYPOTHYROIDISM: ICD-10-CM

## 2025-04-24 DIAGNOSIS — M81.0 AGE-RELATED OSTEOPOROSIS WITHOUT CURRENT PATHOLOGICAL FRACTURE: ICD-10-CM

## 2025-04-24 LAB
DME PARACHUTE DELIVERY DATE REQUESTED: NORMAL
DME PARACHUTE ITEM DESCRIPTION: NORMAL
DME PARACHUTE ITEM DESCRIPTION: NORMAL
DME PARACHUTE ORDER STATUS: NORMAL
DME PARACHUTE SUPPLIER NAME: NORMAL
DME PARACHUTE SUPPLIER PHONE: NORMAL

## 2025-04-24 PROCEDURE — 95251 CONT GLUC MNTR ANALYSIS I&R: CPT | Performed by: NURSE PRACTITIONER

## 2025-04-24 PROCEDURE — 99214 OFFICE O/P EST MOD 30 MIN: CPT | Performed by: NURSE PRACTITIONER

## 2025-04-24 RX ORDER — INSULIN DEGLUDEC 100 U/ML
8 INJECTION, SOLUTION SUBCUTANEOUS
Qty: 15 ML | Refills: 0 | Status: SHIPPED | OUTPATIENT
Start: 2025-04-24

## 2025-04-24 NOTE — ASSESSMENT & PLAN NOTE
Continue 100 mcg of Levoxyl daily. Check TSH with reflex prior to next appointment.   Orders:    TSH, 3rd generation with Free T4 reflex; Future

## 2025-04-24 NOTE — ASSESSMENT & PLAN NOTE
Based on limited CGM data today, patient's glycemic control is improving however, she continues to have significant postprandial hyperglycemia. Counseled on pathophysiology of diabetes. Counseled on negative, long-term effects associated with uncontrolled diabetes including neuropathy, nephropathy, retinopathy, heart attack, and stroke.  Counseled on significance of + SERGEI and low Cpeptide. Counseled on need for mealtime insulin. Patient interested in stopping Trulicity. I think this is reasonable as she is likely not receiving much benefit from GLP-1 class at this time. Ok to continue Metformin 500 mg twice daily. Patient's diet presents a barrier to glycemic control. Referral provided for MNT. In two weeks, I will download CGM report and provide further instructions regarding initiation of mealtime insulin. We briefly discussed the role of insulin pump therapy. As p patient is still adjusting to insulin use, she would prefer to wait on this. Patient knows to notify me with persistent hyperglycemia or episodes of hypoglycemia.  F/U in 8 weeks. Complete labs prior to visit.   Lab Results   Component Value Date    HGBA1C 11.3 (H) 02/14/2025       Orders:    Insulin Pen Needle 32G X 4 MM MISC; Use to inject insulin nightly.    Ambulatory referral to Diabetic Education    Hemoglobin A1C    Albumin / creatinine urine ratio; Future    Basic metabolic panel    insulin degludec (Tresiba FlexTouch) 100 units/mL injection pen; Inject 8 Units under the skin daily at bedtime

## 2025-04-24 NOTE — PROGRESS NOTES
Name: Lydia Patel      : 1955      MRN: [id number]  Encounter Provider: MARIO Ashford  Encounter Date: 2025   Encounter department: [facility]  :  Assessment & Plan  JOSE (latent autoimmune diabetes of adulthood) ([facility])  Based on limited CGM data today, patient's glycemic control is improving however, she continues to have significant postprandial hyperglycemia. Counseled on pathophysiology of diabetes. Counseled on negative, long-term effects associated with uncontrolled diabetes including neuropathy, nephropathy, retinopathy, heart attack, and stroke.  Counseled on significance of + SERGEI and low Cpeptide. Counseled on need for mealtime insulin. Patient interested in stopping Trulicity. I think this is reasonable as she is likely not receiving much benefit from GLP-1 class at this time. Ok to continue Metformin 500 mg twice daily. Patient's diet presents a barrier to glycemic control. Referral provided for MNT. In two weeks, I will download CGM report and provide further instructions regarding initiation of mealtime insulin. We briefly discussed the role of insulin pump therapy. As p patient is still adjusting to insulin use, she would prefer to wait on this. Patient knows to notify me with persistent hyperglycemia or episodes of hypoglycemia.  F/U in 8 weeks. Complete labs prior to visit.   Lab Results   Component Value Date    HGBA1C 11.3 (H) 2025       Orders:    Insulin Pen Needle 32G X 4 MM MISC; Use to inject insulin nightly.    Ambulatory referral to Diabetic Education    Hemoglobin A1C    Albumin / creatinine urine ratio; Future    Basic metabolic panel    insulin degludec (Tresiba FlexTouch) 100 units/mL injection pen; Inject 8 Units under the skin daily at bedtime    Other specified hypothyroidism  Continue 100 mcg of Levoxyl daily. Check TSH with reflex prior to next appointment.   Orders:    TSH, 3rd generation with Free  T4 reflex; Future    Age-related osteoporosis without current pathological fracture  Cc'd on upcoming DEXA scan. Continue 35 mg of risedronate once weekly.        Mixed hyperlipidemia  Continue 20 mg of atorvastatin nightly.              History of Present Illness   HPI  Lydia Patel is a 69 y.o. female with Francoise diagnosed in 2012 as type 2 diabetes, hypertension, hyperlipidemia, hypothyroidism, osteoporosis, and vitamin D deficiency presenting to the office today for follow-up.    Patient was initially evaluated by Dr. Kirsten MD on 2/13/2025.    Patient completed labs on 2/14/2025 which demonstrated a positive SERGEI antibody at 235.1.  ZnT 8, IA 2 autoantibody, and antiislet cell antibody negative.  C-peptide low at 0.9 ng/mL with a fasting glucose of 166 mg/dL.    Thyroid microsomal antibody positive.    Taking 100 mcg of Levoxyl daily. TFT stable.     At that time, insulin was added to patient's regimen given uncontrolled A1c of 11.3%.  In addition, metformin and Jardiance were discontinued.    A1c as of February 14 remains severely elevated 11.3%.    Today, she reports feeling well other than experiencing increased arthralgias for which she is following with orthopedic surgery and physical therapy.  Recently returned from [place] where she was not wearing her CGM or testing her blood sugars.  Denies any episodes or symptoms of hypoglycemia.      Current regimen:    Tresiba 8 units nightly   Trulicity 1.5 mg once weekly    Lydia Patel   Device used Freestyle lynne 3  Home use       Indication   Type 1 Diabetes    More than 72 hours of data was reviewed. Report to be scanned to chart.     Date Range: April 20, 2025-April 22, 2025    Analysis of data:   Average Glucose: 185 mg/dL  Coefficient of Variation: 34.6%  SD : x   Time in Target Range: 53%  Time Above Range: 27% 181 to 250 mg/dL; 20% greater than 250 mg/dL  Time Below Range: 0%    Interpretation of data: Patient's blood sugars are reasonably controlled  "until she eatsAt which time at which time, blood sugars will spike to greater than 250 mg/dL.  Recovery to baseline is slow.          Review of Systems   Constitutional:  Negative for activity change, appetite change, fatigue and unexpected weight change.   HENT:  Negative for dental problem, sore throat, trouble swallowing and voice change.    Eyes:  Negative for visual disturbance.   Respiratory:  Negative for cough, chest tightness and shortness of breath.    Cardiovascular:  Negative for chest pain, palpitations and leg swelling.   Gastrointestinal:  Negative for constipation, diarrhea, nausea and vomiting.   Endocrine: Negative for cold intolerance, heat intolerance, polydipsia, polyphagia and polyuria.   Genitourinary:  Negative for frequency.   Musculoskeletal:  Positive for arthralgias. Negative for back pain, gait problem and myalgias.   Skin:  Negative for wound.   Allergic/Immunologic: Positive for environmental allergies. Negative for food allergies.   Neurological:  Negative for dizziness, weakness, light-headedness, numbness and headaches.   Psychiatric/Behavioral:  Negative for decreased concentration, dysphoric mood and sleep disturbance. The patient is not nervous/anxious.           Objective   /72 (BP Location: Left arm, Patient Position: Sitting, Cuff Size: Standard)   Pulse 82   Temp 98.3 °F (36.8 °C) (Tympanic)   Ht 5' 4\" (1.626 m)   Wt 54 kg (119 lb)   SpO2 98%   BMI 20.43 kg/m²      Physical Exam  Vitals reviewed.   Constitutional:       General: She is not in acute distress.     Appearance: She is well-developed. She is not ill-appearing.   HENT:      Head: Normocephalic and atraumatic.   Eyes:      Conjunctiva/sclera: Conjunctivae normal.   Cardiovascular:      Rate and Rhythm: Normal rate and regular rhythm.      Pulses: Normal pulses.      Heart sounds: Normal heart sounds. No murmur heard.  Pulmonary:      Effort: Pulmonary effort is normal. No respiratory distress.      " Breath sounds: Normal breath sounds.   Abdominal:      Palpations: Abdomen is soft.      Tenderness: There is no abdominal tenderness.   Musculoskeletal:         General: No swelling.      Cervical back: Normal range of motion and neck supple.      Right lower leg: No edema.      Left lower leg: No edema.   Skin:     General: Skin is warm and dry.      Capillary Refill: Capillary refill takes less than 2 seconds.   Neurological:      Mental Status: She is alert.   Psychiatric:         Mood and Affect: Mood normal.

## 2025-04-24 NOTE — PATIENT INSTRUCTIONS
Stop Trulicity  Continue Metformin  Continue Tresiba 8 units nightly  In two weeks, I will download your CGM report for my review  Schedule appointment with diabetes educator to discuss diet  After I review your CGM report, we will contact you with further recommendations.  Follow up in 8 weeks, complete labs prior

## 2025-04-25 ENCOUNTER — OFFICE VISIT (OUTPATIENT)
Dept: PHYSICAL THERAPY | Facility: CLINIC | Age: 70
End: 2025-04-25
Payer: COMMERCIAL

## 2025-04-25 DIAGNOSIS — M25.512 LEFT SHOULDER PAIN, UNSPECIFIED CHRONICITY: Primary | ICD-10-CM

## 2025-04-25 PROCEDURE — 97112 NEUROMUSCULAR REEDUCATION: CPT

## 2025-04-25 PROCEDURE — 97140 MANUAL THERAPY 1/> REGIONS: CPT

## 2025-04-25 PROCEDURE — 97110 THERAPEUTIC EXERCISES: CPT

## 2025-04-25 NOTE — PROGRESS NOTES
"Daily Note     Today's date: 2025  Patient name: Lydia Patel  : 1955  MRN: [id number]  Referring provider: Florecita Murguia DC  Dx:   Encounter Diagnosis     ICD-10-CM    1. Left shoulder pain, unspecified chronicity  M25.512           Start Time: 08  Stop Time: 09  Total time in clinic (min): 38 minutes    Subjective: Pt note feeling great after the last session but was pretty sore the day after.      Objective: See treatment diary below      Assessment: Tolerated treatment well. Patient demonstrated fatigue post treatment, exhibited good technique with therapeutic exercises, and would benefit from continued PT. Pt did well today and progressed tx to add rows with TB. Pt does require frequent cuing for proper performance of exercises therefore RTC isometrics have not been added as part of HEP yet.      Plan: Continue per plan of care.  Progress treatment as tolerated.       Precautions: DM      Manuals           PROM RE RE RE                                                 Neuro Re-Ed             Scap squeeze 3\"X20 3\"x20 3\"x20          RTC iso (flex, abd, IR/ER)  5\"x10 5\"x10          rows   10# 2x10                                                              Ther Ex             AAROM flex Cane 5\"X10 Cane 5\"X10 Cane 5\"x10          Table slides 5\"X10 5\"x10           pulleys  4' 5'          AAROM ER  Cane 5\"x10 Cane 5\"x10          AAROM abd  Cane 5\"x10 Cane 5\"x10                                                 Ther Activity             Pt edu: involved anatomy, physio, HEP, POC, movement patterns  RE                         Gait Training                                       Modalities                                              "

## 2025-04-29 ENCOUNTER — OFFICE VISIT (OUTPATIENT)
Dept: PHYSICAL THERAPY | Facility: CLINIC | Age: 70
End: 2025-04-29
Payer: COMMERCIAL

## 2025-04-29 DIAGNOSIS — M25.512 LEFT SHOULDER PAIN, UNSPECIFIED CHRONICITY: Primary | ICD-10-CM

## 2025-04-29 PROCEDURE — 97112 NEUROMUSCULAR REEDUCATION: CPT

## 2025-04-29 PROCEDURE — 97110 THERAPEUTIC EXERCISES: CPT

## 2025-04-29 PROCEDURE — 97140 MANUAL THERAPY 1/> REGIONS: CPT

## 2025-04-29 NOTE — PROGRESS NOTES
"Daily Note     Today's date: 2025  Patient name: Lydia Patel  : 1955  MRN: [id number]  Referring provider: Florecita Murguia DC  Dx:   Encounter Diagnosis     ICD-10-CM    1. Left shoulder pain, unspecified chronicity  M25.512           Start Time: 0900  Stop Time: 09  Total time in clinic (min): 41 minutes    Subjective: Pt reports she felt really good after last session and that she's been getting bettter as the days go on.      Objective: See treatment diary below      Assessment: Tolerated treatment well. Patient demonstrated fatigue post treatment, exhibited good technique with therapeutic exercises, and would benefit from continued PT. Pt tolerated exercises and PROM much better today noting less pain overall. Added S/L external rotation with good tolerance and no pain.      Plan: Continue per plan of care.  Progress treatment as tolerated.       Precautions: DM      Manuals          PROM RE RE RE RE                                                Neuro Re-Ed             Scap squeeze 3\"X20 3\"x20 3\"x20 3\"x20         RTC iso (flex, abd, IR/ER)  5\"x10 5\"x10 5\"x10         rows   10# 2x10 Blk 2x10                                                             Ther Ex             AAROM flex Cane 5\"X10 Cane 5\"X10 Cane 5\"x10 Cane 10\"x10         Table slides 5\"X10 5\"x10           pulleys  4' 5' 5'         AAROM ER  Cane 5\"x10 Cane 5\"x10 Cane 10\"x10         AAROM abd  Cane 5\"x10 Cane 5\"x10 Cane 5\"x10         S/L ER    1#10x                                   Ther Activity             Pt edu: involved anatomy, physio, HEP, POC, movement patterns  RE                         Gait Training                                       Modalities                                                "

## 2025-05-02 ENCOUNTER — OFFICE VISIT (OUTPATIENT)
Dept: OBGYN CLINIC | Facility: CLINIC | Age: 70
End: 2025-05-02
Payer: COMMERCIAL

## 2025-05-02 ENCOUNTER — APPOINTMENT (OUTPATIENT)
Dept: RADIOLOGY | Facility: AMBULARY SURGERY CENTER | Age: 70
End: 2025-05-02
Attending: PHYSICAL MEDICINE & REHABILITATION
Payer: COMMERCIAL

## 2025-05-02 ENCOUNTER — OFFICE VISIT (OUTPATIENT)
Dept: PHYSICAL THERAPY | Facility: CLINIC | Age: 70
End: 2025-05-02
Payer: COMMERCIAL

## 2025-05-02 VITALS — HEIGHT: 64 IN | BODY MASS INDEX: 20.32 KG/M2 | WEIGHT: 119 LBS

## 2025-05-02 DIAGNOSIS — M54.50 LUMBAR PAIN: ICD-10-CM

## 2025-05-02 DIAGNOSIS — M25.512 CHRONIC PAIN OF BOTH SHOULDERS: Primary | ICD-10-CM

## 2025-05-02 DIAGNOSIS — G89.29 CHRONIC PAIN OF BOTH SHOULDERS: Primary | ICD-10-CM

## 2025-05-02 DIAGNOSIS — M25.511 CHRONIC PAIN OF BOTH SHOULDERS: Primary | ICD-10-CM

## 2025-05-02 DIAGNOSIS — M25.512 LEFT SHOULDER PAIN, UNSPECIFIED CHRONICITY: Primary | ICD-10-CM

## 2025-05-02 DIAGNOSIS — M25.511 CHRONIC RIGHT SHOULDER PAIN: ICD-10-CM

## 2025-05-02 DIAGNOSIS — G89.29 CHRONIC RIGHT SHOULDER PAIN: ICD-10-CM

## 2025-05-02 LAB
DME PARACHUTE DELIVERY DATE ACTUAL: NORMAL
DME PARACHUTE DELIVERY DATE REQUESTED: NORMAL
DME PARACHUTE ITEM DESCRIPTION: NORMAL
DME PARACHUTE ITEM DESCRIPTION: NORMAL
DME PARACHUTE ORDER STATUS: NORMAL
DME PARACHUTE SUPPLIER NAME: NORMAL
DME PARACHUTE SUPPLIER PHONE: NORMAL

## 2025-05-02 PROCEDURE — 97112 NEUROMUSCULAR REEDUCATION: CPT

## 2025-05-02 PROCEDURE — 97110 THERAPEUTIC EXERCISES: CPT

## 2025-05-02 PROCEDURE — 20610 DRAIN/INJ JOINT/BURSA W/O US: CPT | Performed by: PHYSICAL MEDICINE & REHABILITATION

## 2025-05-02 PROCEDURE — 73030 X-RAY EXAM OF SHOULDER: CPT

## 2025-05-02 PROCEDURE — 99214 OFFICE O/P EST MOD 30 MIN: CPT | Performed by: PHYSICAL MEDICINE & REHABILITATION

## 2025-05-02 RX ORDER — ROPIVACAINE HYDROCHLORIDE 5 MG/ML
10 INJECTION, SOLUTION EPIDURAL; INFILTRATION; PERINEURAL
Status: COMPLETED | OUTPATIENT
Start: 2025-05-02 | End: 2025-05-02

## 2025-05-02 RX ORDER — MELOXICAM 15 MG/1
15 TABLET ORAL DAILY PRN
Qty: 60 TABLET | Refills: 0 | Status: SHIPPED | OUTPATIENT
Start: 2025-05-02

## 2025-05-02 RX ORDER — KETOROLAC TROMETHAMINE 30 MG/ML
30 INJECTION, SOLUTION INTRAMUSCULAR; INTRAVENOUS
Status: COMPLETED | OUTPATIENT
Start: 2025-05-02 | End: 2025-05-02

## 2025-05-02 RX ADMIN — ROPIVACAINE HYDROCHLORIDE 10 ML: 5 INJECTION, SOLUTION EPIDURAL; INFILTRATION; PERINEURAL at 08:30

## 2025-05-02 RX ADMIN — KETOROLAC TROMETHAMINE 30 MG: 30 INJECTION, SOLUTION INTRAMUSCULAR; INTRAVENOUS at 08:30

## 2025-05-02 NOTE — ASSESSMENT & PLAN NOTE
Lydia is here in follow up of bilateral shoulder pain likely secondary to calcific/RTC tendinitis, bicep tendinitis and mild GH joint OA.  Treatment has included left subacromial space ketorolac injection.  We did not use steroid as her latest hemoglobin A1c was over 11.  She has been going through physical therapy with improvement.  Today we injected her right subacromial space with ketorolac.  She would benefit from formal physical therapy.  I prescribed her meloxicam that she will use and lieu of the naproxen.  If blood sugar control improves or she goes on the insulin pump, can consider a unilateral steroid injection.  Orders:    XR shoulder 2+ vw right; Future    meloxicam (MOBIC) 15 mg tablet; Take 1 tablet (15 mg total) by mouth daily as needed for moderate pain

## 2025-05-02 NOTE — PROGRESS NOTES
"Daily Note     Today's date: 2025  Patient name: Lydia Patel  : 1955  MRN: [id number]  Referring provider: Florecita Murguia DC  Dx:   Encounter Diagnosis     ICD-10-CM    1. Left shoulder pain, unspecified chronicity  M25.512           Start Time: 1015  Stop Time: 1100  Total time in clinic (min): 45 minutes    Subjective: Pt reports going to the ortho today and he is pleased with her progress so far but he did give her an injection in the R shoulder as that is also bothering her.      Objective: See treatment diary below      Assessment: Tolerated treatment well. Patient demonstrated fatigue post treatment, exhibited good technique with therapeutic exercises, and would benefit from continued PT. Pt continues to need several VC and demonstrations for RTC isometrics therefore will hold off another week on progressing that to HEP. AROM flexion is looking better.      Plan: Continue per plan of care.  Progress treatment as tolerated.       Precautions: DM    1:1 [id number]  Manuals         PROM RE RE RE RE RE                                               Neuro Re-Ed             Scap squeeze 3\"X20 3\"x20 3\"x20 3\"x20         RTC iso (flex, abd, IR/ER)  5\"x10 5\"x10 5\"x10 5\"x10        rows   10# 2x10 Blk 2x10 Blk 2x10                                                            Ther Ex             AAROM flex Cane 5\"X10 Cane 5\"X10 Cane 5\"x10 Cane 10\"x10 Cane 10\"x10        Table slides 5\"X10 5\"x10           pulleys  4' 5' 5' 5'        AAROM ER  Cane 5\"x10 Cane 5\"x10 Cane 10\"x10 Cane 10\"x10        AAROM abd  Cane 5\"x10 Cane 5\"x10 Cane 5\"x10 Cane 10\"X10        S/L ER    1#10x 1# 2x10        Bicep curls    3# 10x 3# 2x10                     Ther Activity             Pt edu: involved anatomy, physio, HEP, POC, movement patterns  RE                         Gait Training                                       Modalities                                                  "

## 2025-05-02 NOTE — PROGRESS NOTES
Assessment & Plan  Chronic pain of both shoulders  Lydia is here in follow up of bilateral shoulder pain likely secondary to calcific/RTC tendinitis, bicep tendinitis and mild GH joint OA.  Treatment has included left subacromial space ketorolac injection.  We did not use steroid as her latest hemoglobin A1c was over 11.  She has been going through physical therapy with improvement.  Today we injected her right subacromial space with ketorolac.  She would benefit from formal physical therapy.  I prescribed her meloxicam that she will use and lieu of the naproxen.  If blood sugar control improves or she goes on the insulin pump, can consider a unilateral steroid injection.  Orders:    XR shoulder 2+ vw right; Future    meloxicam (MOBIC) 15 mg tablet; Take 1 tablet (15 mg total) by mouth daily as needed for moderate pain    Lumbar pain  She also reports chronic bilateral low back pain.  Meloxicam should help with this.  She can also use the methocarbamol at night if needed.  Can also start physical therapy for this.  Orders:    Ambulatory referral to Physical Therapy; Future    meloxicam (MOBIC) 15 mg tablet; Take 1 tablet (15 mg total) by mouth daily as needed for moderate pain    No follow-ups on file.    Imaging Studies (I personally reviewed images in PACS and report):  Left shoulder x-rays most recent to this encounter reviewed.  These images show minimal glenohumeral joint osteoarthritis with a rounded radiodensities that most likely represent calcification in the rotator cuff interval.    Right shoulder x-rays show calcification in the rotator cuff interval.  There is also mild glenohumeral joint osteoarthritis.    Patient is in agreement with the above plan.    HPI:  Lydia Patel is a 69 y.o. female  who presents in follow up.  Here for   Chief Complaint   Patient presents with    Left Shoulder - Follow-up, Pain     Pt reports mild improvement in symptoms from CSI last visit     Right Shoulder - Pain     Pt now  "having similar symptoms in her right shoulder, requesting xrays and possible CSI        Since last visit: See above.    Following history reviewed and updated:  Past Medical History:   Diagnosis Date    Anxiety disorder     Diabetes mellitus (HCC) 2014    Disease of thyroid gland     GERD (gastroesophageal reflux disease)     Hyperlipidemia     Ingrown toenail 1971    Osteoporosis      Past Surgical History:   Procedure Laterality Date     SECTION      HEMORRHOID SURGERY       Social History   Social History     Substance and Sexual Activity   Alcohol Use Yes    Alcohol/week: 1.0 standard drink of alcohol    Types: 1 Glasses of wine per week     Social History     Substance and Sexual Activity   Drug Use Never     Social History     Tobacco Use   Smoking Status Former    Current packs/day: 0.00    Average packs/day: 0.3 packs/day for 5.0 years (1.3 ttl pk-yrs)    Types: Cigarettes    Start date: 1970    Quit date: 1974    Years since quittin.9    Passive exposure: Past   Smokeless Tobacco Never     Family History   Problem Relation Age of Onset    Cancer Mother         skin    Stroke Mother     Hypertension Mother     Hyperlipidemia Mother     Osteoporosis Mother     Thyroid disease unspecified Mother     Hypertension Father     Cancer Father     Lung cancer Father         mesothelioma    Depression Sister     Thyroid disease Sister     Neurological problems Sister         essential tremor    Stomach cancer Maternal Grandmother     Heart attack Maternal Grandfather     Heart attack Paternal Grandmother     Pneumonia Paternal Grandfather     Completed Suicide  Paternal Uncle 80     Allergies   Allergen Reactions    Bacitracin-Neomycin-Polymyxin Rash    Anatoliy-Bacit-Poly-Lidocaine Itching        Constitutional:  Ht 5' 4.02\" (1.626 m)   Wt 54 kg (119 lb)   BMI 20.42 kg/m²    General: NAD.  Eyes: Clear sclerae.  ENT: No inflammation, lesion, or mass of lips.  No tracheal " deviation.  Musculoskeletal: As mentioned below.  Integumentary: No visible rashes or skin lesions.  Pulmonary/Chest: Effort normal. No respiratory distress.   Neuro: CN's grossly intact, BARRETT.  Psych: Normal affect and judgement.  Vascular: WWP.    Right Shoulder Exam     Tenderness   The patient is experiencing tenderness in the acromion and biceps tendon.    Tests   Tian test: positive  Impingement: positive    Other   Erythema: absent  Scars: absent  Sensation: normal  Pulse: present      Left Shoulder Exam     Tenderness   The patient is experiencing tenderness in the acromion and biceps tendon.    Tests   Tian test: positive  Impingement: positive    Other   Erythema: absent  Scars: absent  Sensation: normal  Pulse: present              Large joint arthrocentesis: R subacromial bursa  [place] Protocol:  procedure performed by consultantConsent: Verbal consent obtained. Written consent not obtained.  Risks and benefits: risks, benefits and alternatives were discussed  Consent given by: patient  Timeout called at: 5/2/2025 8:44 AM.  Patient understanding: patient states understanding of the procedure being performed  Site marked: the operative site was marked  Patient identity confirmed: verbally with patient  Supporting Documentation  Indications: pain     Is this a Visco injection? NoProcedure Details  Location: shoulder - R subacromial bursa  Needle gauge: 21G 2''  Ultrasound guidance: no  Approach: posterolateral  Medications administered: 10 mL ropivacaine 0.5 %; 30 mg ketorolac 30 mg/mL    Patient tolerance: patient tolerated the procedure well with no immediate complications  Dressing:  Sterile dressing applied    There was little to no resistance encountered during the injection.    Risks of this procedure include:    - Risk of bleeding since a needle is involved.  - Risk of infection (1/10,000 chance as per recent studies).  Signs/symptoms were discussed and they would prompt an urgent evaluation  at an emergency department.  - Risk of pigmentation or skin dimpling in the skin (2-3% chance as per recent studies) from the steroid.  - Risk of increased pain from steroid flare (1% chance as per recent studies) that typically lasts 24-48 hours.  - Risk of increased blood sugars from the steroid medication that can last for a few weeks.  If the patient is a diabetic or pre-diabetic, they were encouraged to closely monitor their blood sugars and discuss with PCP if elevated more than usual or if having symptoms.    The benefits outweigh the risks and so the procedure was completed.

## 2025-05-05 ENCOUNTER — OFFICE VISIT (OUTPATIENT)
Dept: PHYSICAL THERAPY | Facility: CLINIC | Age: 70
End: 2025-05-05
Payer: COMMERCIAL

## 2025-05-05 DIAGNOSIS — M25.512 LEFT SHOULDER PAIN, UNSPECIFIED CHRONICITY: Primary | ICD-10-CM

## 2025-05-05 PROCEDURE — 97110 THERAPEUTIC EXERCISES: CPT | Performed by: PHYSICAL THERAPIST

## 2025-05-05 PROCEDURE — 97112 NEUROMUSCULAR REEDUCATION: CPT | Performed by: PHYSICAL THERAPIST

## 2025-05-05 PROCEDURE — 97140 MANUAL THERAPY 1/> REGIONS: CPT

## 2025-05-05 PROCEDURE — 97140 MANUAL THERAPY 1/> REGIONS: CPT | Performed by: PHYSICAL THERAPIST

## 2025-05-05 PROCEDURE — 97110 THERAPEUTIC EXERCISES: CPT

## 2025-05-05 PROCEDURE — 97112 NEUROMUSCULAR REEDUCATION: CPT

## 2025-05-05 NOTE — PROGRESS NOTES
"Daily Note     Today's date: 2025  Patient name: Lydia Patel  : 1955  MRN: [id number]  Referring provider: Florecita Murguia DC  Dx:   Encounter Diagnosis     ICD-10-CM    1. Left shoulder pain, unspecified chronicity  M25.512           Start Time: 1018  Stop Time: 1101  Total time in clinic (min): 43 minutes    Subjective: Pt reports both shoulders were feeling really good this weekend.      Objective: See treatment diary below      Assessment: Tolerated treatment well. Patient demonstrated fatigue post treatment, exhibited good technique with therapeutic exercises, and would benefit from continued PT. Pt was less guarded with PROM today allowing for improved ROM.       Plan: Continue per plan of care.  Progress treatment as tolerated.       Precautions: DM      Manuals        PROM RE RE RE RE RE RE                                              Neuro Re-Ed             Scap squeeze 3\"X20 3\"x20 3\"x20 3\"x20         RTC iso (flex, abd, IR/ER)  5\"x10 5\"x10 5\"x10 5\"x10 5\"x10       rows   10# 2x10 Blk 2x10 Blk 2x10 Blk 3x10                                                           Ther Ex             AAROM flex Cane 5\"X10 Cane 5\"X10 Cane 5\"x10 Cane 10\"x10 Cane 10\"x10 Cane 10\"x10       Table slides 5\"X10 5\"x10           pulleys  4' 5' 5' 5' 5'       AAROM ER  Cane 5\"x10 Cane 5\"x10 Cane 10\"x10 Cane 10\"x10 Cane 10\"x10       AAROM abd  Cane 5\"x10 Cane 5\"x10 Cane 5\"x10 Cane 10\"X10 Cane 10\"x10       S/L ER    1#10x 1# 2x10 1# 2x10       Bicep curls    3# 10x 3# 2x10 3# 2x10                    Ther Activity             Pt edu: involved anatomy, physio, HEP, POC, movement patterns  RE                         Gait Training                                       Modalities                                                    "

## 2025-05-08 ENCOUNTER — OFFICE VISIT (OUTPATIENT)
Dept: PHYSICAL THERAPY | Facility: CLINIC | Age: 70
End: 2025-05-08
Payer: COMMERCIAL

## 2025-05-08 DIAGNOSIS — M25.512 LEFT SHOULDER PAIN, UNSPECIFIED CHRONICITY: Primary | ICD-10-CM

## 2025-05-08 PROCEDURE — 97110 THERAPEUTIC EXERCISES: CPT

## 2025-05-08 PROCEDURE — 97110 THERAPEUTIC EXERCISES: CPT | Performed by: PHYSICAL THERAPIST

## 2025-05-08 PROCEDURE — 97112 NEUROMUSCULAR REEDUCATION: CPT | Performed by: PHYSICAL THERAPIST

## 2025-05-08 PROCEDURE — 97112 NEUROMUSCULAR REEDUCATION: CPT

## 2025-05-08 NOTE — PROGRESS NOTES
"Daily Note     Today's date: 2025  Patient name: Lydia Patel  : 1955  MRN: [id number]  Referring provider: Florecita Murguia DC  Dx:   Encounter Diagnosis     ICD-10-CM    1. Left shoulder pain, unspecified chronicity  M25.512           Start Time: 1015  Stop Time: 1055  Total time in clinic (min): 40 minutes    Subjective: Pt reports she is seeing an improvement in her shoulder over the last few days.      Objective: See treatment diary below      Assessment: Tolerated treatment well. Patient demonstrated fatigue post treatment, exhibited good technique with therapeutic exercises, and would benefit from continued PT. Pts AROM continues to improve however continues to require several VC for proper performance of isometrics.      Plan: Continue per plan of care.  Progress treatment as tolerated.       Precautions: DM    1:1 [id number]  Manuals       PROM RE RE RE RE RE RE RE                                             Neuro Re-Ed             Scap squeeze 3\"X20 3\"x20 3\"x20 3\"x20         RTC iso (flex, abd, IR/ER)  5\"x10 5\"x10 5\"x10 5\"x10 5\"x10 5\"x10      rows   10# 2x10 Blk 2x10 Blk 2x10 Blk 3x10 10# 3x10                                                          Ther Ex             AAROM flex Cane 5\"X10 Cane 5\"X10 Cane 5\"x10 Cane 10\"x10 Cane 10\"x10 Cane 10\"x10 Cane 10\"x10      Table slides 5\"X10 5\"x10           pulleys  4' 5' 5' 5' 5' 5'      AAROM ER  Cane 5\"x10 Cane 5\"x10 Cane 10\"x10 Cane 10\"x10 Cane 10\"x10 Cane 10\"x10      AAROM abd  Cane 5\"x10 Cane 5\"x10 Cane 5\"x10 Cane 10\"X10 Cane 10\"x10 Cane 10\"x10      S/L ER    1#10x 1# 2x10 1# 2x10 1# 2x10      Bicep curls    3# 10x 3# 2x10 3# 2x10 3# 2x10                   Ther Activity             Pt edu: involved anatomy, physio, HEP, POC, movement patterns  RE                         Gait Training                                       Modalities                                                      "

## 2025-05-12 ENCOUNTER — OFFICE VISIT (OUTPATIENT)
Dept: PHYSICAL THERAPY | Facility: CLINIC | Age: 70
End: 2025-05-12
Payer: COMMERCIAL

## 2025-05-12 DIAGNOSIS — M25.512 LEFT SHOULDER PAIN, UNSPECIFIED CHRONICITY: Primary | ICD-10-CM

## 2025-05-12 PROCEDURE — 97110 THERAPEUTIC EXERCISES: CPT | Performed by: PHYSICAL THERAPIST

## 2025-05-12 PROCEDURE — 97112 NEUROMUSCULAR REEDUCATION: CPT | Performed by: PHYSICAL THERAPIST

## 2025-05-12 PROCEDURE — 97140 MANUAL THERAPY 1/> REGIONS: CPT | Performed by: PHYSICAL THERAPIST

## 2025-05-12 NOTE — PROGRESS NOTES
"Daily Note     Today's date: 2025  Patient name: Lydia Patel  : 1955  MRN: [id number]  Referring provider: Florecita Murguia DC  Dx:   Encounter Diagnosis     ICD-10-CM    1. Left shoulder pain, unspecified chronicity  M25.512           Start Time: 928  Stop Time: 1010  Total time in clinic (min): 42 minutes    Subjective: Pt reports she has been feeling better on her L shoulder but her R shoulder has been bothering her.      Objective: See treatment diary below      Assessment: Tolerated treatment fair. Patient demonstrated fatigue post treatment, exhibited good technique with therapeutic exercises, and would benefit from continued PT. Pt did well today despite complaints about the R shoulder. She still demonstrates improvements for both shoulders with AROM.      Plan: Continue per plan of care.  Progress treatment as tolerated.       Precautions: DM      Manuals      PROM RE RE RE RE RE RE RE RE                                            Neuro Re-Ed             Scap squeeze 3\"X20 3\"x20 3\"x20 3\"x20         RTC iso (flex, abd, IR/ER)  5\"x10 5\"x10 5\"x10 5\"x10 5\"x10 5\"x10 5\"x10     rows   10# 2x10 Blk 2x10 Blk 2x10 Blk 3x10 10# 3x10 Blk 3x10                                                         Ther Ex             AAROM flex Cane 5\"X10 Cane 5\"X10 Cane 5\"x10 Cane 10\"x10 Cane 10\"x10 Cane 10\"x10 Cane 10\"x10 Cane 10\"x10     Table slides 5\"X10 5\"x10           pulleys  4' 5' 5' 5' 5' 5' 5'     AAROM ER  Cane 5\"x10 Cane 5\"x10 Cane 10\"x10 Cane 10\"x10 Cane 10\"x10 Cane 10\"x10 Cane 10\"x10     AAROM abd  Cane 5\"x10 Cane 5\"x10 Cane 5\"x10 Cane 10\"X10 Cane 10\"x10 Cane 10\"x10 Cane 10\"X10     S/L ER    1#10x 1# 2x10 1# 2x10 1# 2x10 1# 2x10     Bicep curls    3# 10x 3# 2x10 3# 2x10 3# 2x10 3# 3x10                  Ther Activity             Pt edu: involved anatomy, physio, HEP, POC, movement patterns  RE                         Gait Training                                     "   Modalities

## 2025-05-13 ENCOUNTER — HOSPITAL ENCOUNTER (OUTPATIENT)
Dept: RADIOLOGY | Age: 70
Discharge: HOME | End: 2025-05-13
Payer: COMMERCIAL

## 2025-05-13 VITALS — HEIGHT: 64 IN | BODY MASS INDEX: 20.66 KG/M2 | WEIGHT: 121 LBS

## 2025-05-13 DIAGNOSIS — M81.0 AGE-RELATED OSTEOPOROSIS WITHOUT CURRENT PATHOLOGICAL FRACTURE: ICD-10-CM

## 2025-05-13 PROCEDURE — 77080 DXA BONE DENSITY AXIAL: CPT

## 2025-05-14 ENCOUNTER — OFFICE VISIT (OUTPATIENT)
Dept: PHYSICAL THERAPY | Facility: CLINIC | Age: 70
End: 2025-05-14
Payer: COMMERCIAL

## 2025-05-14 DIAGNOSIS — M25.512 LEFT SHOULDER PAIN, UNSPECIFIED CHRONICITY: Primary | ICD-10-CM

## 2025-05-14 PROCEDURE — 97112 NEUROMUSCULAR REEDUCATION: CPT | Performed by: PHYSICAL THERAPIST

## 2025-05-14 PROCEDURE — 97140 MANUAL THERAPY 1/> REGIONS: CPT | Performed by: PHYSICAL THERAPIST

## 2025-05-14 PROCEDURE — 97110 THERAPEUTIC EXERCISES: CPT | Performed by: PHYSICAL THERAPIST

## 2025-05-14 NOTE — PROGRESS NOTES
"Daily Note     Today's date: 2025  Patient name: Lydia Patel  : 1955  MRN: [id number]  Referring provider: Florecita Murguia DC  Dx:   Encounter Diagnosis     ICD-10-CM    1. Left shoulder pain, unspecified chronicity  M25.512           Start Time: 1010  Stop Time: 1048  Total time in clinic (min): 38 minutes    Subjective: Pt notes no changes since last session.      Objective: See treatment diary below      Assessment: Tolerated treatment well. Patient demonstrated fatigue post treatment, exhibited good technique with therapeutic exercises, and would benefit from continued PT. Added curls to HEP. Discussed with the pt possibly adding isometrics to HEP but she is also concerned about doing them properly. Her AROM is improving.      Plan: Continue per plan of care.  Progress treatment as tolerated.       Precautions: DM      Manuals      PROM  RE RE RE RE RE RE RE RE                                           Neuro Re-Ed             Scap squeeze  3\"x20 3\"x20 3\"x20         RTC iso (flex, abd, IR/ER)  5\"x10 5\"x10 5\"x10 5\"x10 5\"x10 5\"x10 5\"x10 5\"x10    rows   10# 2x10 Blk 2x10 Blk 2x10 Blk 3x10 10# 3x10 Blk 3x10 Blk 3x10                                                        Ther Ex             AAROM flex  Cane 5\"X10 Cane 5\"x10 Cane 10\"x10 Cane 10\"x10 Cane 10\"x10 Cane 10\"x10 Cane 10\"x10 Cane 10\"X10    Table slides  5\"x10           pulleys  4' 5' 5' 5' 5' 5' 5' 5'    AAROM ER  Cane 5\"x10 Cane 5\"x10 Cane 10\"x10 Cane 10\"x10 Cane 10\"x10 Cane 10\"x10 Cane 10\"x10 Cane 10\"x10    AAROM abd  Cane 5\"x10 Cane 5\"x10 Cane 5\"x10 Cane 10\"X10 Cane 10\"x10 Cane 10\"x10 Cane 10\"X10 Cane 10\"x10    S/L ER    1#10x 1# 2x10 1# 2x10 1# 2x10 1# 2x10 1# 3x10    Bicep curls    3# 10x 3# 2x10 3# 2x10 3# 2x10 3# 3x10 3# 3x10                 Ther Activity             Pt edu: involved anatomy, physio, HEP, POC, movement patterns                           Gait Training                                    "    Modalities

## 2025-05-19 ENCOUNTER — OFFICE VISIT (OUTPATIENT)
Dept: PHYSICAL THERAPY | Facility: CLINIC | Age: 70
End: 2025-05-19
Payer: COMMERCIAL

## 2025-05-19 DIAGNOSIS — M25.512 LEFT SHOULDER PAIN, UNSPECIFIED CHRONICITY: Primary | ICD-10-CM

## 2025-05-19 PROCEDURE — 97140 MANUAL THERAPY 1/> REGIONS: CPT | Performed by: PHYSICAL THERAPIST

## 2025-05-19 PROCEDURE — 97110 THERAPEUTIC EXERCISES: CPT | Performed by: PHYSICAL THERAPIST

## 2025-05-19 PROCEDURE — 97112 NEUROMUSCULAR REEDUCATION: CPT | Performed by: PHYSICAL THERAPIST

## 2025-05-19 NOTE — PROGRESS NOTES
"Daily Note     Today's date: 2025  Patient name: Lydia Patel  : 1955  MRN: [id number]  Referring provider: Florecita Murguia DC  Dx:   Encounter Diagnosis     ICD-10-CM    1. Left shoulder pain, unspecified chronicity  M25.512           Start Time: 08  Stop Time: 930  Total time in clinic (min): 43 minutes    Subjective: Pt would like to try more at home and will just see how she does with the exercises and following the print outs.      Objective: See treatment diary below      Assessment: Tolerated treatment well. Patient demonstrated fatigue post treatment, exhibited good technique with therapeutic exercises, and would benefit from continued PT. Her LUE is moving better and more comfortably. We will try 1x/wk for now. Went through HEP isometrics with print outs to ensure pt was doing them correctly.      Plan: Continue per plan of care.  Progress treatment as tolerated.       Precautions: DM      Manuals     PROM  RE RE RE RE RE RE RE RE RE                                          Neuro Re-Ed             Scap squeeze  3\"x20 3\"x20 3\"x20         RTC iso (flex, abd, IR/ER)  5\"x10 5\"x10 5\"x10 5\"x10 5\"x10 5\"x10 5\"x10 5\"x10 5\"X10   rows   10# 2x10 Blk 2x10 Blk 2x10 Blk 3x10 10# 3x10 Blk 3x10 Blk 3x10 Blk 3x10                                                       Ther Ex             AAROM flex  Cane 5\"X10 Cane 5\"x10 Cane 10\"x10 Cane 10\"x10 Cane 10\"x10 Cane 10\"x10 Cane 10\"x10 Cane 10\"X10 Cane 10\"X10   Table slides  5\"x10           pulleys  4' 5' 5' 5' 5' 5' 5' 5' 5'   AAROM ER  Cane 5\"x10 Cane 5\"x10 Cane 10\"x10 Cane 10\"x10 Cane 10\"x10 Cane 10\"x10 Cane 10\"x10 Cane 10\"x10 Cane 10\"x10   AAROM abd  Cane 5\"x10 Cane 5\"x10 Cane 5\"x10 Cane 10\"X10 Cane 10\"x10 Cane 10\"x10 Cane 10\"X10 Cane 10\"x10 Cane 10\"x10   S/L ER    1#10x 1# 2x10 1# 2x10 1# 2x10 1# 2x10 1# 3x10    Bicep curls    3# 10x 3# 2x10 3# 2x10 3# 2x10 3# 3x10 3# 3x10 3# 3x10                Ther Activity          "    Pt edu: involved anatomy, physio, HEP, POC, movement patterns                           Gait Training                                       Modalities

## 2025-05-19 NOTE — HOME EXERCISE EDUCATION
Program_ID:[id number]   Access Code: [email or web address]  URL: [email or web address]  Date: 05-  Prepared By: Martha Naylor    Program Notes      Exercises      - Seated Scapular Retraction - 1 x daily - 3-7 x weekly - 3 sets - 10 reps - 3 seconds hold      - Supine Shoulder Flexion Extension AAROM with Dowel - 1 x daily - 3-7 x weekly - 1 sets - 10 reps - 10 seconds hold      - Seated Shoulder Flexion Towel Slide at Table Top - 1 x daily - 3-7 x weekly - 1 sets - 10 reps - 10 seconds hold      - Standing Shoulder External Rotation AAROM with Dowel - 1 x daily - 3-7 x weekly - 1 sets - 10 reps - 5 seconds hold      - Standing Shoulder Abduction AAROM with Dowel - 1 x daily - 3-7 x weekly - 1 sets - 10 reps - 5 seconds hold      - Supine Bridge - 1 x daily - 3-7 x weekly - 2 sets - 10 reps - 3 seconds hold      - Seated Biceps Curl - 1 x daily - 3-7 x weekly - 3 sets - 10 reps      - Isometric Shoulder Abduction at Wall - 1 x daily - 3-7 x weekly - 1 sets - 10 reps - 5 seconds hold      - Isometric Shoulder Flexion at Wall - 1 x daily - 3-7 x weekly - 1 sets - 10 reps - 5 seconds hold      - Isometric Shoulder External Rotation at Wall - 1 x daily - 3-7 x weekly - 1 sets - 10 reps - 5 seconds hold      - Standing Isometric Shoulder Internal Rotation at Doorway - 1 x daily - 3-7 x weekly - 1 sets - 10 reps - 5 seconds hold

## 2025-05-22 ENCOUNTER — APPOINTMENT (OUTPATIENT)
Dept: PHYSICAL THERAPY | Facility: CLINIC | Age: 70
End: 2025-05-22
Payer: COMMERCIAL

## 2025-05-25 ENCOUNTER — RA CDI HCC (OUTPATIENT)
Dept: OTHER | Facility: HOSPITAL | Age: 70
End: 2025-05-25

## 2025-05-27 ENCOUNTER — OFFICE VISIT (OUTPATIENT)
Dept: PHYSICAL THERAPY | Facility: CLINIC | Age: 70
End: 2025-05-27
Payer: COMMERCIAL

## 2025-05-27 ENCOUNTER — PROCEDURE VISIT (OUTPATIENT)
Age: 70
End: 2025-05-27
Payer: COMMERCIAL

## 2025-05-27 VITALS — BODY MASS INDEX: 20.66 KG/M2 | HEIGHT: 64 IN | WEIGHT: 121 LBS

## 2025-05-27 DIAGNOSIS — M25.512 LEFT SHOULDER PAIN, UNSPECIFIED CHRONICITY: Primary | ICD-10-CM

## 2025-05-27 DIAGNOSIS — M54.16 LUMBAR RADICULOPATHY: ICD-10-CM

## 2025-05-27 DIAGNOSIS — M99.03 SEGMENTAL DYSFUNCTION OF LUMBAR REGION: ICD-10-CM

## 2025-05-27 DIAGNOSIS — M99.04 SEGMENTAL DYSFUNCTION OF SACRAL REGION: Primary | ICD-10-CM

## 2025-05-27 DIAGNOSIS — M99.02 SEGMENTAL DYSFUNCTION OF THORACIC REGION: ICD-10-CM

## 2025-05-27 DIAGNOSIS — M25.512 LEFT SHOULDER PAIN, UNSPECIFIED CHRONICITY: ICD-10-CM

## 2025-05-27 DIAGNOSIS — M47.816 LUMBAR SPONDYLOSIS: ICD-10-CM

## 2025-05-27 PROCEDURE — 97140 MANUAL THERAPY 1/> REGIONS: CPT | Performed by: PHYSICAL THERAPIST

## 2025-05-27 PROCEDURE — 97112 NEUROMUSCULAR REEDUCATION: CPT | Performed by: PHYSICAL THERAPIST

## 2025-05-27 PROCEDURE — 98941 CHIROPRACT MANJ 3-4 REGIONS: CPT | Performed by: CHIROPRACTOR

## 2025-05-27 PROCEDURE — 97110 THERAPEUTIC EXERCISES: CPT | Performed by: PHYSICAL THERAPIST

## 2025-05-27 NOTE — PROGRESS NOTES
Initial date of service: 3/14/24    Diagnoses and all orders for this visit:    Segmental dysfunction of sacral region    Segmental dysfunction of lumbar region    Segmental dysfunction of thoracic region    Lumbar spondylosis    Left shoulder pain, unspecified chronicity    Lumbar radiculopathy       ASSESSMENT:   Pt's symptoms and exam findings consistent with lumbar spondylosis with associated segmental dysfunction secondary to repetitive st/sp injury, exacerbated by postural/ergonomic stressors. Pt responded well to flexion biased stretches and manual mobilization of the affected spinal and myofascial tissues with increased ROM; trial of conservative tx recommended consisting of stretching, graded mobilization/manipulation of the affected spinal and myofascial jt dysfunction, postural/ergonomic education and take home stretches/exercises. If symptoms fail to improve with short trial of conservative care, appropriate imaging and referral will be coordinated.  -Improvements in pain and mm spasm in lower back and glutes post-trx, Back to PT just for refresher.  8/6- The pt had good progress. The patient is feeling improvements with treatment.   3/18/25- Flare up with increase pain in the lower back and shoulder, tolerated treatment well with decrease pain and mm spasm,   3/27/25- The patient is making good progress with lower back pain but continued pain with shoulder. Pt referral to Ortho and PT.   4/1/25- The patient has improvements with Ortho injection, and will be starting PT after vacation.   5/27/25- The patient is feeling flare up in lower back pain R sciatica. Tolerated treatment well and decreased pain and mm spasm.     PROCEDURE CODES: 66735-[id number]    TREATMENT:  Fear avoidance behavior discussion; encouraged and reassured pt that natural course of condition is to improve over time with adherence to tx plan and home care strategies. Home care recommendations: avoid bed rest, walk (but avoid trails and uneven  surfaces), gradual return to activity to tolerance (avoid anything that peripheralizes symptoms), call if symptoms peripheralize, worsen, or neurologic deficit progresses. Ther-ex: IASTM; discussed post procedure soreness and/or ecchymosis for up to 36 hrs, applied to affected mm hypertonicities; supine hamstring stretch, supine gluteal stretch, side laying QL stretch, single knee to chest stretch, hip flexor pin-and-stretch, alternating prone hip extension, glute bridge, transitional mvmt education, abdominal bracing; greater than 15 min spent performing above mentioned ther-ex to improve ROM/flexibility. Thoracic mobilization/manipulation: prone P-A mob, Lumbar mobilization/manipulation: diversified side laying graded HVLA, flexion-traction; SIJ Manipulation/Mobilization: R/L SIJ HVLA - long axis distraction, dobbs drop table maneuver to affected SIJ    HPI:  Lydia Patel is a 69 y.o. female  Chief Complaint   Patient presents with   • Sciatica     Right 5 started few a weeks ago      The patient presents to the office with lower back pain that started in November when picking up a heavy planter, the patient has images and course of PT which helped 66% of the pain but still has on spot that is an issue in the morning. Getting on cough and once finished coffee pain is better. The patient has no pain in to the extremities. Continues stretches and exercises from PT that ended 2 wks ago. She has had Chiropractic.   4/4- The patient reports she has improvements in the pain, the patient mentions its still there.    4/16- The patient is feeling better overall and had two good mornings.   4/285- The patient pain as last visit but mentions no zinger type pain.  5/2- The patient is feeling the same.   5/9- The patient is feeling a little better overall, doing well with exercises with the hips.  6/4- The ps is feeling a little better overall 0/10  7/9-The patient is feeling much better  8/6- The patient is better overall,  she is on a treadmill and starting PT.   3/18/25- The patient reports worsening pain after starting back at the gym 2 wks ago. L shoulder 8-9/10. and lower back increased pain 8-9/10.   3/27/25- The patient reports lower back is feeling better but the shoulder is worse pain.  25- The patient reports she has a NSAID shot into the shoulder which helped shoulder pain a lot, the patient was not able to get steroid injection bc sugars were too high.  2/10 in shoulder and lower back pain.   25- The patient is feeling R sided sciatica with lower back, she reports the L shoulder arm pain started beginning in April and then then R arm started to hurt during on PT. Then her wrists started  to ache. Radiates midway posterior R thigh,     Back Pain    Arm Pain       Past Medical History:   Diagnosis Date   • Anxiety disorder    • Diabetes mellitus (HCC)    • Disease of thyroid gland    • GERD (gastroesophageal reflux disease)    • Hyperlipidemia    • Ingrown toenail    • Osteoporosis       Past Surgical History:   Procedure Laterality Date   •  SECTION     • HEMORRHOID SURGERY       The following portions of the patient's history were reviewed and updated as appropriate: allergies, past family history, past medical history, past social history, past surgical history, and problem list.  Review of Systems   Musculoskeletal:  Positive for back pain.     Physical Exam    Musculoskeletal:         General: Tenderness present.      Thoracic back: Spasms and tenderness present. Decreased range of motion.      Lumbar back: Spasms and tenderness present. No swelling, edema, deformity, signs of trauma or lacerations. Decreased range of motion.        Back:      Neurological:      Gait: Gait is intact.      Deep Tendon Reflexes: Reflexes are normal and symmetric.       SOFT TISSUE ASSESSMENT Hypertonicity and tenderness palpated B T10-S1 erector spinae, hip flexor, glute med/min, QL, hamstring JOINT  RESTRICTIONS: T10-S1 and L SIJ ORTHO: SI jt point tenderness: +; Iliana unremarkable for centralization/peripheralization; jerry's, iliac compression, thigh thrust elicit lbp in L SIJ; prone femoral nerve stretch neg for upper lumbar neural tension, elicits R/L SIJ stiffness; sitting root elicits no lbp on R/L; slump test elicits no neural tension R/L    Return in about 1 week (around 6/3/2025) for Recheck.

## 2025-05-27 NOTE — PROGRESS NOTES
"Daily Note     Today's date: 2025  Patient name: Lydia Patel  : 1955  MRN: [id number]  Referring provider: Florecita Murguia DC  Dx:   Encounter Diagnosis     ICD-10-CM    1. Left shoulder pain, unspecified chronicity  M25.512           Start Time: 0800  Stop Time: 0845  Total time in clinic (min): 45 minutes    Subjective: Pt feels that her shoulder was uncomfortable over the weekend specifically when trying to sleep.      Objective: See treatment diary below      Assessment: Tolerated treatment well. Patient demonstrated fatigue post treatment, exhibited good technique with therapeutic exercises, and would benefit from continued PT. Pt continues to require frequent VC for proper performance of isometrics however she states following the print outs allow her to perform the exercises properly. Added AROM shoulder flexion and abduction with good form to 90 degrees with mirror feedback to prevent shoulder shrugging.      Plan: Continue per plan of care.  Progress treatment as tolerated.       Precautions: DM    1:1 [id number]  Manuals    PROM RE   RE RE RE RE RE RE RE                                          Neuro Re-Ed             Scap squeeze    3\"x20         RTC iso (flex, abd, IR/ER) 5\"x10   5\"x10 5\"x10 5\"x10 5\"x10 5\"x10 5\"x10 5\"X10   rows    Blk 2x10 Blk 2x10 Blk 3x10 10# 3x10 Blk 3x10 Blk 3x10 Blk 3x10   I, T mirror 10x ea                                                   Ther Ex             AAROM flex Cane 10\"x10   Cane 10\"x10 Cane 10\"x10 Cane 10\"x10 Cane 10\"x10 Cane 10\"x10 Cane 10\"X10 Cane 10\"X10   Table slides             pulleys 5'   5' 5' 5' 5' 5' 5' 5'   AAROM ER Cane 10\"x10   Cane 10\"x10 Cane 10\"x10 Cane 10\"x10 Cane 10\"x10 Cane 10\"x10 Cane 10\"x10 Cane 10\"x10   AAROM abd Cane 10\"x10   Cane 5\"x10 Cane 10\"X10 Cane 10\"x10 Cane 10\"x10 Cane 10\"X10 Cane 10\"x10 Cane 10\"x10   S/L ER 1# 3x10   1#10x 1# 2x10 1# 2x10 1# 2x10 1# 2x10 1# 3x10    Mamadou yeung    3# 10x 3# " 2x10 3# 2x10 3# 2x10 3# 3x10 3# 3x10 3# 3x10                Ther Activity             Pt edu: involved anatomy, physio, HEP, POC, movement patterns                           Gait Training                                       Modalities

## 2025-05-28 ENCOUNTER — CONSULT (OUTPATIENT)
Dept: DIABETES SERVICES | Facility: CLINIC | Age: 70
End: 2025-05-28
Attending: NURSE PRACTITIONER
Payer: COMMERCIAL

## 2025-05-28 VITALS — WEIGHT: 125 LBS | BODY MASS INDEX: 21.46 KG/M2

## 2025-05-28 DIAGNOSIS — E13.9 LADA (LATENT AUTOIMMUNE DIABETES OF ADULTHOOD) (HCC): Primary | ICD-10-CM

## 2025-05-28 PROCEDURE — 97802 MEDICAL NUTRITION INDIV IN: CPT

## 2025-05-28 NOTE — PATIENT INSTRUCTIONS
Eat 3 regular meals ~4-5 hours apart with 1-2 snacks per day as needed.  Keep carbohydrate intake consistent. Aim for ~30 grams of carbohydrate per meal and 0-15 grams of carbohydrate per snack.

## 2025-05-28 NOTE — PROGRESS NOTES
"Medical Nutrition Therapy    Assessment    Visit Type: Initial visit  Chief complaint/Medical Diagnosis/reason for visit: E13.9 (ICD-10-CM) - JOSE (latent autoimmune diabetes of adulthood) ([facility])     HPI Lydia Patel was seen today unaccompanied regarding JOSE. Last HbA1c was 11.3%in February 2025. Conducted dietary recall. See below for details. Explained basic pathophysiology of diabetes and impact of diet on blood glucose levels. Together we discussed what foods contain carbohydrates, reading a food label, timing of meals and snacks, serving sizes, the role of fiber, the importance of consistent carbohydrate intake in the appropriate amounts. Used the portion booklet to teach Lydia more about food groups and basic carbohydrate counting. Encouraged 3 regular meals ~4-5 hours apart with 1-2 snacks per day as needed. Discussed keeping carbohydrate intake consistent. Goal is ~30 grams of carbohydrate per meal and 0-15 grams of carbohydrate per snack. Lydia demonstrated good understanding and will call with any questions or if she would like to schedule a follow-up visit.    Ht Readings from Last 1 Encounters:   05/27/25 5' 4\" (1.626 m)     Wt Readings from Last 3 Encounters:   05/28/25 56.7 kg (125 lb)   05/27/25 54.9 kg (121 lb)   05/13/25 54.9 kg (121 lb)     Weight Change: Yes - has noticed weight gain since starting insulin at the end of March per patient. Weight gained confirmed per EMR review. EMR shows 19 pound weight gain since beginning of April 2025.    Barriers to Learning: no barriers    Do you follow any special diet presently?: No  Who shops: patient  Who cooks: patient    Food Log: Completed via the method of usual daily food recall    Breakfast: 8 am - 1 egg and a piece of toast (omega toast) - sometimes with jam, 2 cups of coffee with 1 tbsp of half and half and 1 tbsp of flavored creamer  Morning Snack: 10 am - differs daily, may have fruit such as strawberries or blueberries or apples or sometimes " Pt resting on cart. In NAD. No changes in pt presentation. Awaiting transport to .   another piece of bread or half an english muffin  Lunch: half a sandwich with turkey with avocado or ham with cheese, may have a handful of chips, zero sugar soda  Afternoon Snack: half a Setswana with coffee at 3 pm  Dinner: may have chick feliberto a, filet of fish at [employer], roast beef sliders or chicken pot pie, makes soups in the winter, zero sugar soda, water  Evening Snack: 9 pm - salsa with tortilla chips or small pretzels in whipped cream cheese or baked savana in hummus  Beverages: water, 3 cups of coffee per day with 1 tbsp of half and half and 1 tbsp of flavored creamer in each cup, zero sugar soda  Exercise: in PT twice a week, has a treadmill at home but does not use it regularly per patient    Estimated calorie needs: 1,280 kcals/day   Carbohydrate: ~30 g/meal, 0-15 g/snack       Fat: 3 servings/day      Protein: 5 ounces/day    Nutrition Diagnosis:  Inconsistent carbohydrate intake related to food and nutrition related knowledge deficit concerning appropriate amount and timing of carbohydrate intake as evidenced by estimated carbohydrate intake that is different from recommended types or ingested on an irregular basis.    Intervention: increased fiber intake, label reading, behavior modification strategies, carbohydrate counting, meal timing, meal planning, monitoring portion control, and exercise guidelines     Treatment Goals: Patient understands education and recommendations, Patient will consume 3 meals a day, Patient will monitor portion control, Patient will consume snacks, Patient will count carbohydrates, Patient will exercise, and Patient will monitor blood glucose    Monitoring and evaluation:    Term code indicator  FH 4.4 Mealtime Behavior Criteria: Eat 3 regular meals ~4-5 hours apart with 1-2 snacks per day as needed.  Term code indicator  FH 1.6.3 Carbohydrate Intake Criteria: Keep carbohydrate intake consistent. Aim for ~30 grams of carbohydrate per meal and 0-15 grams of carbohydrate per  snack.    Materials Provided: Portion booklet    Patient’s Response to Instruction:  Comprehension: good  Motivation: good  Expected Compliance: good    Start- Stop: 11:50-12:50  Total Minutes: 60 Minutes  Group or Individual Instruction: MNT-I  Other: MARIO Ashford    Thank you for coming to the [facility] for education today. Please feel free to call with any questions or concerns.    Yenny Durand, MS, RD, LDN  [id number]  [place] [phone]

## 2025-05-29 DIAGNOSIS — E13.9 LADA (LATENT AUTOIMMUNE DIABETES OF ADULTHOOD) (HCC): Primary | ICD-10-CM

## 2025-05-29 RX ORDER — LANCETS 33 GAUGE
EACH MISCELLANEOUS
Qty: 200 EACH | Refills: 5 | Status: SHIPPED | OUTPATIENT
Start: 2025-05-29

## 2025-05-29 NOTE — PROGRESS NOTES
"Daily Note     Today's date: 2025  Patient name: Lydia Patel  : 1955  MRN: [id number]  Referring provider: Florecita Murguia DC  Dx:   Encounter Diagnosis     ICD-10-CM    1. Left shoulder pain, unspecified chronicity  M25.512                      Subjective: Pt reports her L shdr has improved but notes the R shdr has been bothersome lately, as well as her wrists. Pt states she has an appt w/ PCP next week.       Objective: See treatment diary below      Assessment: Tolerated treatment well. Patient exhibited good technique with therapeutic exercises and would benefit from continued PT.  Good tolerance to PROM, w/ the exception of ER, which causes mild discomfort at end range. Min cues needed w/ isometrics to maintain proper technique.       Plan: Continue per plan of care.      Precautions: DM    1:1 8:53-9:31am  Manuals    PROM RE LM  RE RE RE RE RE RE RE                                          Neuro Re-Ed             Scap squeeze    3\"x20         RTC iso (flex, abd, IR/ER) 5\"x10 5\"x10  5\"x10 5\"x10 5\"x10 5\"x10 5\"x10 5\"x10 5\"X10   rows    Blk 2x10 Blk 2x10 Blk 3x10 10# 3x10 Blk 3x10 Blk 3x10 Blk 3x10   I, T mirror 10x ea 10 ea                                                  Ther Ex             AAROM flex Cane 10\"x10 Cane 10x10\"  Cane 10\"x10 Cane 10\"x10 Cane 10\"x10 Cane 10\"x10 Cane 10\"x10 Cane 10\"X10 Cane 10\"X10   Table slides             pulleys 5' 5'  5' 5' 5' 5' 5' 5' 5'   AAROM ER Cane 10\"x10 Cane 10x10\"  Cane 10\"x10 Cane 10\"x10 Cane 10\"x10 Cane 10\"x10 Cane 10\"x10 Cane 10\"x10 Cane 10\"x10   AAROM abd Cane 10\"x10 Cane 10x10\"  Cane 5\"x10 Cane 10\"X10 Cane 10\"x10 Cane 10\"x10 Cane 10\"X10 Cane 10\"x10 Cane 10\"x10   S/L ER 1# 3x10 1# 3x10  1#10x 1# 2x10 1# 2x10 1# 2x10 1# 2x10 1# 3x10    Bicep curls    3# 10x 3# 2x10 3# 2x10 3# 2x10 3# 3x10 3# 3x10 3# 3x10                Ther Activity             Pt edu: involved anatomy, physio, HEP, POC, movement patterns       "                     Gait Training                                       Modalities

## 2025-05-30 ENCOUNTER — OFFICE VISIT (OUTPATIENT)
Dept: PHYSICAL THERAPY | Facility: CLINIC | Age: 70
End: 2025-05-30
Payer: COMMERCIAL

## 2025-05-30 ENCOUNTER — APPOINTMENT (OUTPATIENT)
Dept: PHYSICAL THERAPY | Facility: CLINIC | Age: 70
End: 2025-05-30
Payer: COMMERCIAL

## 2025-05-30 DIAGNOSIS — M25.512 LEFT SHOULDER PAIN, UNSPECIFIED CHRONICITY: Primary | ICD-10-CM

## 2025-05-30 PROCEDURE — 97112 NEUROMUSCULAR REEDUCATION: CPT

## 2025-05-30 PROCEDURE — 97110 THERAPEUTIC EXERCISES: CPT

## 2025-05-30 PROCEDURE — 97140 MANUAL THERAPY 1/> REGIONS: CPT

## 2025-06-03 ENCOUNTER — DOCUMENTATION (OUTPATIENT)
Dept: ADMINISTRATIVE | Facility: OTHER | Age: 70
End: 2025-06-03

## 2025-06-03 ENCOUNTER — OFFICE VISIT (OUTPATIENT)
Dept: PHYSICAL THERAPY | Facility: CLINIC | Age: 70
End: 2025-06-03
Payer: COMMERCIAL

## 2025-06-03 ENCOUNTER — OFFICE VISIT (OUTPATIENT)
Dept: FAMILY MEDICINE CLINIC | Facility: CLINIC | Age: 70
End: 2025-06-03
Payer: COMMERCIAL

## 2025-06-03 VITALS
TEMPERATURE: 97.2 F | WEIGHT: 124.4 LBS | DIASTOLIC BLOOD PRESSURE: 78 MMHG | BODY MASS INDEX: 21.24 KG/M2 | HEIGHT: 64 IN | SYSTOLIC BLOOD PRESSURE: 110 MMHG | OXYGEN SATURATION: 98 % | HEART RATE: 71 BPM

## 2025-06-03 DIAGNOSIS — M25.512 LEFT SHOULDER PAIN, UNSPECIFIED CHRONICITY: Primary | ICD-10-CM

## 2025-06-03 DIAGNOSIS — E11.65 TYPE 2 DIABETES MELLITUS WITH HYPERGLYCEMIA, WITHOUT LONG-TERM CURRENT USE OF INSULIN (HCC): ICD-10-CM

## 2025-06-03 DIAGNOSIS — M25.50 ARTHRALGIA OF MULTIPLE JOINTS: Primary | ICD-10-CM

## 2025-06-03 PROCEDURE — 97110 THERAPEUTIC EXERCISES: CPT | Performed by: PHYSICAL THERAPIST

## 2025-06-03 PROCEDURE — 99213 OFFICE O/P EST LOW 20 MIN: CPT | Performed by: NURSE PRACTITIONER

## 2025-06-03 PROCEDURE — 97112 NEUROMUSCULAR REEDUCATION: CPT | Performed by: PHYSICAL THERAPIST

## 2025-06-03 PROCEDURE — G2211 COMPLEX E/M VISIT ADD ON: HCPCS | Performed by: NURSE PRACTITIONER

## 2025-06-03 PROCEDURE — 97140 MANUAL THERAPY 1/> REGIONS: CPT | Performed by: PHYSICAL THERAPIST

## 2025-06-03 RX ORDER — CELECOXIB 100 MG/1
100 CAPSULE ORAL 2 TIMES DAILY
Qty: 60 CAPSULE | Refills: 0 | Status: SHIPPED | OUTPATIENT
Start: 2025-06-03

## 2025-06-03 RX ORDER — DULAGLUTIDE 1.5 MG/.5ML
INJECTION, SOLUTION SUBCUTANEOUS
COMMUNITY
Start: 2025-05-27

## 2025-06-03 NOTE — PROGRESS NOTES
"Daily Note     Today's date: 6/3/2025  Patient name: Lydia Patel  : 1955  MRN: [id number]  Referring provider: Florecita Murguia DC  Dx:   Encounter Diagnosis     ICD-10-CM    1. Left shoulder pain, unspecified chronicity  M25.512           Start Time: 817  Stop Time: 08  Total time in clinic (min): 40 minutes    Subjective: Pt notes that she has been feeling pretty good since her last session and demos AROM upon arrival to [facility].      Objective: See treatment diary below      Assessment: Tolerated treatment well. Patient demonstrated fatigue post treatment, exhibited good technique with therapeutic exercises, and would benefit from continued PT. Progressed to add TB resistance with IR/ER strengthening which she tolerated really well. Will look to progress HEP at next session.      Plan: Continue per plan of care.  Progress treatment as tolerated.       Precautions: DM      Manuals 5/27 5/30 6/3   5/5 5/8 5/12 5/14 5/19   PROM RE LM RE   RE RE RE RE RE                                          Neuro Re-Ed             Scap squeeze             RTC iso (flex, abd, IR/ER) 5\"x10 5\"x10    5\"x10 5\"x10 5\"x10 5\"x10 5\"X10   rows   Blk 3x10   Blk 3x10 10# 3x10 Blk 3x10 Blk 3x10 Blk 3x10   I, T mirror 10x ea 10 ea 10x full ROM 10x 1# to 90          ER @ 0    RTB 2x10          IR @ 0   RTB 2x10          pulldowns   Blk 2x10          Ther Ex             AAROM flex Cane 10\"x10 Cane 10x10\"    Cane 10\"x10 Cane 10\"x10 Cane 10\"x10 Cane 10\"X10 Cane 10\"X10   Table slides             pulleys 5' 5' 5'   5' 5' 5' 5' 5'   AAROM ER Cane 10\"x10 Cane 10x10\"    Cane 10\"x10 Cane 10\"x10 Cane 10\"x10 Cane 10\"x10 Cane 10\"x10   AAROM abd Cane 10\"x10 Cane 10x10\"    Cane 10\"x10 Cane 10\"x10 Cane 10\"X10 Cane 10\"x10 Cane 10\"x10   S/L ER 1# 3x10 1# 3x10    1# 2x10 1# 2x10 1# 2x10 1# 3x10    Bicep curls   3# 3x10   3# 2x10 3# 2x10 3# 3x10 3# 3x10 3# 3x10                Ther Activity             Pt edu: involved anatomy, physio, HEP, POC, " movement patterns                           Gait Training                                       Modalities

## 2025-06-03 NOTE — PROGRESS NOTES
A106/03/25 2:33 PM     DM A1c outreach is not required, patient has an upcoming appointment with the PCP office.    Thank you.  Joanne Storm  [facility]

## 2025-06-03 NOTE — PROGRESS NOTES
"Name: Lydia Patel      : 1955      MRN: [id number]  Encounter Provider: MARIO Barreto  Encounter Date: 6/3/2025   Encounter department: [facility]  :  Assessment & Plan  Arthralgia of multiple joints    Orders:  •  celecoxib (CeleBREX) 100 mg capsule; Take 1 capsule (100 mg total) by mouth 2 (two) times a day  •  Lyme Total AB W Reflex to IGM/IGG; Future    Type 2 diabetes mellitus with hyperglycemia, without long-term current use of insulin ([facility])    Lab Results   Component Value Date    HGBA1C 11.3 (H) 2025       Orders:  •  Comprehensive metabolic panel; Future  •  Lipid Panel with Direct LDL reflex; Future  •  CBC and Platelet; Future          Depression Screening and Follow-up Plan: Patient was screened for depression during today's encounter. They screened negative with a PHQ-2 score of 0.        History of Present Illness   69 y.o.female presenting with generalized achy joints and muscles that seems to be evolving recently. She was seen by orthopedics for chronic shoulder pains and was given meloxicam and methocarbamol which she feels it is not helping much. She feels like the joints are more inflammatory than muscular. Patient also wished to review most recent lab results.      Review of Systems   Constitutional: Negative.    Respiratory: Negative.     Cardiovascular: Negative.    Gastrointestinal: Negative.    Musculoskeletal:  Positive for arthralgias, back pain and myalgias.   Neurological: Negative.    Psychiatric/Behavioral: Negative.         Objective   /78 (BP Location: Right arm, Patient Position: Sitting, Cuff Size: Standard)   Pulse 71   Temp (!) 97.2 °F (36.2 °C) (Temporal)   Ht 5' 4\" (1.626 m)   Wt 56.4 kg (124 lb 6.4 oz)   SpO2 98%   BMI 21.35 kg/m² (Reviewed)     Physical Exam  Vitals reviewed.   Constitutional:       General: She is not in acute distress.     Appearance: She is not ill-appearing.   HENT:      Head: Normocephalic and " atraumatic.      Right Ear: External ear normal.      Left Ear: External ear normal.     Eyes:      Extraocular Movements: Extraocular movements intact.      Conjunctiva/sclera: Conjunctivae normal.      Pupils: Pupils are equal, round, and reactive to light.       Cardiovascular:      Rate and Rhythm: Normal rate and regular rhythm.   Pulmonary:      Effort: Pulmonary effort is normal.     Musculoskeletal:         General: Tenderness present. No swelling or deformity.      Right lower leg: No edema.      Left lower leg: No edema.     Skin:     General: Skin is warm and dry.      Capillary Refill: Capillary refill takes less than 2 seconds.     Neurological:      Mental Status: She is alert and oriented to person, place, and time.     Psychiatric:         Mood and Affect: Mood normal.         Behavior: Behavior normal.

## 2025-06-03 NOTE — ASSESSMENT & PLAN NOTE
Lab Results   Component Value Date    HGBA1C 11.3 (H) 02/14/2025       Orders:  •  Comprehensive metabolic panel; Future  •  Lipid Panel with Direct LDL reflex; Future  •  CBC and Platelet; Future

## 2025-06-04 ENCOUNTER — RESULTS FOLLOW-UP (OUTPATIENT)
Dept: FAMILY MEDICINE CLINIC | Facility: CLINIC | Age: 70
End: 2025-06-04

## 2025-06-04 ENCOUNTER — APPOINTMENT (OUTPATIENT)
Dept: LAB | Facility: CLINIC | Age: 70
End: 2025-06-04
Payer: COMMERCIAL

## 2025-06-04 DIAGNOSIS — E11.65 INADEQUATELY CONTROLLED DIABETES MELLITUS (HCC): Primary | ICD-10-CM

## 2025-06-04 DIAGNOSIS — M25.50 PAIN IN JOINT, MULTIPLE SITES: ICD-10-CM

## 2025-06-04 DIAGNOSIS — E03.9 HYPOTHYROIDISM, UNSPECIFIED TYPE: ICD-10-CM

## 2025-06-04 DIAGNOSIS — E13.9 LADA (LATENT AUTOIMMUNE DIABETES OF ADULTHOOD) (HCC): ICD-10-CM

## 2025-06-04 LAB
ALBUMIN SERPL BCG-MCNC: 4.2 G/DL (ref 3.5–5)
ALP SERPL-CCNC: 74 U/L (ref 34–104)
ALT SERPL W P-5'-P-CCNC: 13 U/L (ref 7–52)
ANION GAP SERPL CALCULATED.3IONS-SCNC: 9 MMOL/L (ref 4–13)
AST SERPL W P-5'-P-CCNC: 12 U/L (ref 13–39)
B BURGDOR IGG+IGM SER QL IA: NEGATIVE
BILIRUB SERPL-MCNC: 0.33 MG/DL (ref 0.2–1)
BUN SERPL-MCNC: 16 MG/DL (ref 5–25)
CALCIUM SERPL-MCNC: 9.5 MG/DL (ref 8.4–10.2)
CHLORIDE SERPL-SCNC: 101 MMOL/L (ref 96–108)
CHOLEST SERPL-MCNC: 149 MG/DL (ref ?–200)
CO2 SERPL-SCNC: 29 MMOL/L (ref 21–32)
CREAT SERPL-MCNC: 0.54 MG/DL (ref 0.6–1.3)
CREAT UR-MCNC: 55 MG/DL
EST. AVERAGE GLUCOSE BLD GHB EST-MCNC: 252 MG/DL
GFR SERPL CREATININE-BSD FRML MDRD: 95 ML/MIN/1.73SQ M
GLUCOSE P FAST SERPL-MCNC: 155 MG/DL (ref 65–99)
HBA1C MFR BLD: 10.4 %
HDLC SERPL-MCNC: 74 MG/DL
LDLC SERPL CALC-MCNC: 64 MG/DL (ref 0–100)
MICROALBUMIN UR-MCNC: <7 MG/L
NONHDLC SERPL-MCNC: 75 MG/DL
POTASSIUM SERPL-SCNC: 4.8 MMOL/L (ref 3.5–5.3)
PROT SERPL-MCNC: 6.5 G/DL (ref 6.4–8.4)
SODIUM SERPL-SCNC: 139 MMOL/L (ref 135–147)
T4 FREE SERPL-MCNC: 0.99 NG/DL (ref 0.61–1.12)
TRIGL SERPL-MCNC: 56 MG/DL (ref ?–150)
TSH SERPL DL<=0.05 MIU/L-ACNC: 15.22 UIU/ML (ref 0.45–4.5)

## 2025-06-04 PROCEDURE — 80061 LIPID PANEL: CPT

## 2025-06-04 PROCEDURE — 80053 COMPREHEN METABOLIC PANEL: CPT

## 2025-06-04 PROCEDURE — 82570 ASSAY OF URINE CREATININE: CPT

## 2025-06-04 PROCEDURE — 82043 UR ALBUMIN QUANTITATIVE: CPT

## 2025-06-04 PROCEDURE — 84439 ASSAY OF FREE THYROXINE: CPT

## 2025-06-04 PROCEDURE — 86618 LYME DISEASE ANTIBODY: CPT

## 2025-06-04 PROCEDURE — 36415 COLL VENOUS BLD VENIPUNCTURE: CPT

## 2025-06-04 PROCEDURE — 84443 ASSAY THYROID STIM HORMONE: CPT

## 2025-06-05 ENCOUNTER — RESULTS FOLLOW-UP (OUTPATIENT)
Dept: ENDOCRINOLOGY | Facility: CLINIC | Age: 70
End: 2025-06-05

## 2025-06-05 DIAGNOSIS — E03.8 OTHER SPECIFIED HYPOTHYROIDISM: Primary | ICD-10-CM

## 2025-06-05 RX ORDER — LEVOTHYROXINE SODIUM 112 UG/1
112 TABLET ORAL DAILY
Qty: 90 TABLET | Refills: 0 | Status: SHIPPED | OUTPATIENT
Start: 2025-06-05

## 2025-06-06 ENCOUNTER — OFFICE VISIT (OUTPATIENT)
Dept: ENDOCRINOLOGY | Facility: CLINIC | Age: 70
End: 2025-06-06
Payer: COMMERCIAL

## 2025-06-06 VITALS
TEMPERATURE: 97.9 F | RESPIRATION RATE: 16 BRPM | OXYGEN SATURATION: 97 % | WEIGHT: 123 LBS | HEIGHT: 64 IN | HEART RATE: 98 BPM | DIASTOLIC BLOOD PRESSURE: 76 MMHG | BODY MASS INDEX: 21 KG/M2 | SYSTOLIC BLOOD PRESSURE: 112 MMHG

## 2025-06-06 DIAGNOSIS — E78.2 MIXED HYPERLIPIDEMIA: ICD-10-CM

## 2025-06-06 DIAGNOSIS — E55.9 VITAMIN D DEFICIENCY: ICD-10-CM

## 2025-06-06 DIAGNOSIS — M81.0 AGE-RELATED OSTEOPOROSIS WITHOUT CURRENT PATHOLOGICAL FRACTURE: ICD-10-CM

## 2025-06-06 DIAGNOSIS — E03.8 OTHER SPECIFIED HYPOTHYROIDISM: ICD-10-CM

## 2025-06-06 DIAGNOSIS — E13.9 LADA (LATENT AUTOIMMUNE DIABETES OF ADULTHOOD) (HCC): Primary | ICD-10-CM

## 2025-06-06 PROBLEM — E11.65 TYPE 2 DIABETES MELLITUS WITH HYPERGLYCEMIA, WITHOUT LONG-TERM CURRENT USE OF INSULIN (HCC): Status: RESOLVED | Noted: 2018-12-05 | Resolved: 2025-06-06

## 2025-06-06 PROCEDURE — 99215 OFFICE O/P EST HI 40 MIN: CPT | Performed by: INTERNAL MEDICINE

## 2025-06-06 PROCEDURE — G2211 COMPLEX E/M VISIT ADD ON: HCPCS | Performed by: INTERNAL MEDICINE

## 2025-06-06 NOTE — PROGRESS NOTES
"    Follow-up Patient Progress Note      CC: Diabetes     History of Present Illness:   69yr female with JOSE diagnosed 2012 as T2DM, HTN, HLD, Hypothyroidism, Osteoporosis, vit D deficiency. Last visit was 4/25/25 with CRNP.     She was originally diagnosed in 2012 with mild hyperglycemia and started on oral agents.      CGM data review::  Device: lynne    Dates:  5/23/25 - 6/6/25         Usage: 87 %             Av glu: 320 mg/dL                   SD:  mg/dL            CV: 24.2 %        GMI: 11 %  TIR: 5 %        TAR: 16+79 %             TBR:  %     Glycemic patters:  severe fasting and pp hyperglycemia.  Hypoglycemia: No     Current meds:  Tresiba 8u qhs       Opthamology:   Podiatry:   vaccination:   Dental:  Pancreatitis:      Ace/ARB:   Statin:  Thyroid issues:  On levothyroxine 100mcg qdaily     Osteoporosis: started Risedronate 2023 after last DXA scan    Physical Exam:  Body mass index is 21.11 kg/m².  /76 (BP Location: Right arm, Patient Position: Sitting)   Pulse 98   Temp 97.9 °F (36.6 °C) (Temporal)   Resp 16   Ht 5' 4\" (1.626 m)   Wt 55.8 kg (123 lb)   SpO2 97%   BMI 21.11 kg/m²    Vitals:    06/06/25 1345   Weight: 55.8 kg (123 lb)        Physical Exam  Constitutional:       General: She is not in acute distress.     Appearance: She is well-developed.   HENT:      Head: Normocephalic and atraumatic.      Nose: Nose normal.     Eyes:      Conjunctiva/sclera: Conjunctivae normal.     Pulmonary:      Effort: Pulmonary effort is normal.   Abdominal:      General: There is no distension.     Musculoskeletal:      Cervical back: Normal range of motion and neck supple.     Skin:     Findings: No rash.      Comments: No icterus     Neurological:      Mental Status: She is alert and oriented to person, place, and time.         Labs:   Lab Results   Component Value Date    HGBA1C 10.4 (H) 06/04/2025       Lab Results   Component Value Date    TWH3XRRPLCTK 15.218 (H) 06/04/2025       Lab Results "   Component Value Date    CREATININE 0.54 (L) 06/04/2025    CREATININE 0.57 (L) 02/14/2025    CREATININE 0.56 (L) 02/07/2025    BUN 16 06/04/2025    K 4.8 06/04/2025     06/04/2025    CO2 29 06/04/2025     eGFR   Date Value Ref Range Status   06/04/2025 95 ml/min/1.73sq m Final       Lab Results   Component Value Date    ALT 13 06/04/2025    AST 12 (L) 06/04/2025    ALKPHOS 74 06/04/2025       Lab Results   Component Value Date    CHOLESTEROL 149 06/04/2025    CHOLESTEROL 171 02/07/2025     Lab Results   Component Value Date    HDL 74 06/04/2025    HDL 52 02/07/2025     Lab Results   Component Value Date    TRIG 56 06/04/2025    TRIG 106 02/07/2025     Lab Results   Component Value Date    NONHDLC 75 06/04/2025    NONHDLC 119 02/07/2025         Assessment/Plan:    1. JOSE (latent autoimmune diabetes of adulthood) ([facility])  Assessment & Plan:  She is uncontrolled. She should be treated as Type 1 diabetes.    Advised to maintain goal blood sugars 70-180mg/dL  We reviewed cgm data and agreed to use following regimen.  Send cgm data every 6 weeks.      Your Current Insulin  and dose is: Before Breakfast Before Lunch Before Evening Meal Bedtime   Humalog 3u 3u 3u    Regular, Apidra, Humalog orNovolog Sliding Scale:   <80              151-200 + 1 +1 +1    201-250 +2 +2 +2 +   251-300 +3 +3 +3 +   301-350 +4 +4 +4 +   >350 +5 +5 +5 +     Tresiba 10u        Stp metformin and Trulicity.  In future, we will transition to flexible insulin therapy followed by insulin pump and sensor combination.    Follow up in 3 months.    Lab Results   Component Value Date    HGBA1C 10.4 (H) 06/04/2025     Orders:  -     Ambulatory referral to Diabetic Education; Future  2. Other specified hypothyroidism  Assessment & Plan:  She seems clinically and biochemically.  She was just started on higher dose after TSh was 15uIU/mL.    Continue levothyroxine 112mcg qdaily and follow up in 3 months with repeat labs.  3. Age-related  osteoporosis without current pathological fracture  4. Mixed hyperlipidemia  5. Vitamin D deficiency        I have spent a total time of 40 minutes on 06/06/25 in caring for this patient including greater than 50% of this time was spent in counseling/coordination of care as listed above.       Discussed with the patient and all questioned fully answered. She will contact me with concerns.    Demetrius Curtis

## 2025-06-06 NOTE — ASSESSMENT & PLAN NOTE
She is uncontrolled. She should be treated as Type 1 diabetes.    Advised to maintain goal blood sugars 70-180mg/dL  We reviewed cgm data and agreed to use following regimen.  Send cgm data every 6 weeks.      Your Current Insulin  and dose is: Before Breakfast Before Lunch Before Evening Meal Bedtime   Humalog 3u 3u 3u    Regular, Apidra, Humalog orNovolog Sliding Scale:   <80              151-200 + 1 +1 +1    201-250 +2 +2 +2 +   251-300 +3 +3 +3 +   301-350 +4 +4 +4 +   >350 +5 +5 +5 +     Tresiba 10u        Stp metformin and Trulicity.  In future, we will transition to flexible insulin therapy followed by insulin pump and sensor combination.    Follow up in 3 months.    Lab Results   Component Value Date    HGBA1C 10.4 (H) 06/04/2025     
She seems clinically and biochemically.  She was just started on higher dose after TSh was 15uIU/mL.    Continue levothyroxine 112mcg qdaily and follow up in 3 months with repeat labs.  
Patient

## 2025-06-06 NOTE — PATIENT INSTRUCTIONS
INSULIN DOSAGE INSTRUCTIONS    Name: Lydia Patel                        : 1955  MRN #: [id number]    Your Current Insulin  and dose is: Before Breakfast Before Lunch Before Evening Meal Bedtime   Humalog 3u 3u 3u    Regular, Apidra, Humalog orNovolog Sliding Scale:   <80              151-200 + 1 +1 +1    201-250 +2 +2 +2 +   251-300 +3 +3 +3 +   301-350 +4 +4 +4 +   >350 +5 +5 +5 +     Tresiba 10u        Additional Instructions:   Please test your blood sugar: 4 times daily- before meals and bedtime OR use a glucose sensor.  Target Blood sugar range _70_to _180__.  Call if your MARIO Barreto  blood sugar is less than _60_ or greater than _400__.    Today's Date: 2025

## 2025-06-10 ENCOUNTER — APPOINTMENT (OUTPATIENT)
Dept: PHYSICAL THERAPY | Facility: CLINIC | Age: 70
End: 2025-06-10
Payer: COMMERCIAL

## 2025-06-16 DIAGNOSIS — M25.50 ARTHRALGIA OF MULTIPLE JOINTS: Primary | ICD-10-CM

## 2025-06-17 ENCOUNTER — APPOINTMENT (OUTPATIENT)
Dept: PHYSICAL THERAPY | Facility: CLINIC | Age: 70
End: 2025-06-17
Payer: COMMERCIAL

## 2025-06-17 ENCOUNTER — OFFICE VISIT (OUTPATIENT)
Dept: DIABETES SERVICES | Facility: CLINIC | Age: 70
End: 2025-06-17
Payer: COMMERCIAL

## 2025-06-17 ENCOUNTER — TELEPHONE (OUTPATIENT)
Dept: DIABETES SERVICES | Facility: CLINIC | Age: 70
End: 2025-06-17

## 2025-06-17 VITALS — BODY MASS INDEX: 21.32 KG/M2 | WEIGHT: 124.2 LBS

## 2025-06-17 DIAGNOSIS — E13.9 LADA (LATENT AUTOIMMUNE DIABETES OF ADULTHOOD) (HCC): Primary | ICD-10-CM

## 2025-06-17 PROCEDURE — G0108 DIAB MANAGE TRN  PER INDIV: HCPCS

## 2025-06-17 RX ORDER — INSULIN ASPART 100 [IU]/ML
INJECTION, SOLUTION INTRAVENOUS; SUBCUTANEOUS
Qty: 15 ML | Refills: 1 | Status: SHIPPED | OUTPATIENT
Start: 2025-06-17

## 2025-06-17 NOTE — PROGRESS NOTES
Carbohydrate Counting Instruction    Met with Lydia Patel for carbohydrate counting. Lydia is currently on the following insulin regimen: 10 units of Tresiba daily and 1,000 mg of metformin twice daily. She was instructed to start mealtime insulin, but states she has never received the humalog. Sent message to Dr. Curtis regarding same. Reviewed humalog timing/dosing for when she receives it. Patient understands humalog scale provided by Dr. Curtis at her last visit. Lydia is currently using Roxanna CGM. Lydia reports of elevated blood sugars after eating. Reviewed Roxanna tim, 79% <250 mg/dL, 14% 181-250 mg/dL and 7%  mg/dL. Informed her that initiation of mealtime insulin will help with elevated blood sugars along with consistent carbohydrate intake. Patient is interested in going on an insulin pump in the future. Briefly discussed some questions the patient had regarding this.     Patient instructed on: Carbohydrate counting. Instruction was provided using power point slides, food labels and an interactive carbohydrate counting activity. Patient appeared to comprehend the materials presented at the visit. Patient demonstrates good math skills and did well on the carbohydrate counting activity they completed at the visit. Patient agreed to keep a 3 day food record and return it in one week for assessment.   Comments: Lydia has good understanding of carbohydrate counting.    Diabetes Education Record: Lydia was given the following educational materials: Portion booklet and 3-day food and insulin record.    Patient response to instruction    Comprehension: good  Motivation: good  Expected Compliance: good  Readiness: preparation  Barriers to Learning: none known     Start- Stop: 8:40-9:40  Total Minutes: 60 Minutes  Group or Individual Instruction: DSME-I  Other: Demetrius Curtis MD    Thank you for referring your patient to [facility], it was a pleasure working with them today.  Please feel free to call with any questions or concerns.    Yenny Durand, MS, RD, LDN  [id number]  [place] [phone]

## 2025-06-17 NOTE — TELEPHONE ENCOUNTER
Hi Dr. Curtis,    I met with Lydia today for carbohydrate counting education in preparation for flexible insulin therapy. She informed me that she does not believe the humalog was ever ordered as she has not received it yet. Just wanted to bring this to your attention.    Thanks in advance!

## 2025-06-17 NOTE — PATIENT INSTRUCTIONS
Return 3-day food and insulin log in ~1 week for assessment. May either drop off log or email pictures of log to [email or web address].

## 2025-06-17 NOTE — TELEPHONE ENCOUNTER
I am covering for Dr. THAPA     I have sent RX for novolog pen as it is covered under insurance as per his note, 3 units with meals   Could inform pt   Thanks

## 2025-06-18 ENCOUNTER — TELEPHONE (OUTPATIENT)
Age: 70
End: 2025-06-18

## 2025-06-18 ENCOUNTER — APPOINTMENT (OUTPATIENT)
Dept: LAB | Facility: CLINIC | Age: 70
End: 2025-06-18
Payer: COMMERCIAL

## 2025-06-18 ENCOUNTER — CONSULT (OUTPATIENT)
Dept: RHEUMATOLOGY | Facility: CLINIC | Age: 70
End: 2025-06-18
Payer: COMMERCIAL

## 2025-06-18 VITALS
OXYGEN SATURATION: 97 % | DIASTOLIC BLOOD PRESSURE: 68 MMHG | HEART RATE: 83 BPM | WEIGHT: 123 LBS | HEIGHT: 64 IN | BODY MASS INDEX: 21 KG/M2 | SYSTOLIC BLOOD PRESSURE: 122 MMHG

## 2025-06-18 DIAGNOSIS — M81.0 AGE-RELATED OSTEOPOROSIS WITHOUT CURRENT PATHOLOGICAL FRACTURE: ICD-10-CM

## 2025-06-18 DIAGNOSIS — M25.50 ARTHRALGIA OF MULTIPLE JOINTS: ICD-10-CM

## 2025-06-18 DIAGNOSIS — E13.9 LADA (LATENT AUTOIMMUNE DIABETES OF ADULTHOOD) (HCC): ICD-10-CM

## 2025-06-18 DIAGNOSIS — M35.3 PMR (POLYMYALGIA RHEUMATICA) (HCC): Primary | ICD-10-CM

## 2025-06-18 DIAGNOSIS — M35.3 PMR (POLYMYALGIA RHEUMATICA) (HCC): ICD-10-CM

## 2025-06-18 LAB
CRP SERPL QL: 2.4 MG/L
ERYTHROCYTE [SEDIMENTATION RATE] IN BLOOD: 3 MM/HOUR (ref 0–29)
RHEUMATOID FACT SERPL-ACNC: <10 IU/ML

## 2025-06-18 PROCEDURE — 86140 C-REACTIVE PROTEIN: CPT

## 2025-06-18 PROCEDURE — 99205 OFFICE O/P NEW HI 60 MIN: CPT | Performed by: INTERNAL MEDICINE

## 2025-06-18 PROCEDURE — 86200 CCP ANTIBODY: CPT

## 2025-06-18 PROCEDURE — 86431 RHEUMATOID FACTOR QUANT: CPT

## 2025-06-18 PROCEDURE — 36415 COLL VENOUS BLD VENIPUNCTURE: CPT

## 2025-06-18 PROCEDURE — 85652 RBC SED RATE AUTOMATED: CPT

## 2025-06-18 RX ORDER — PREDNISONE 5 MG/1
10 TABLET ORAL DAILY
Qty: 60 TABLET | Refills: 4 | Status: SHIPPED | OUTPATIENT
Start: 2025-06-18

## 2025-06-18 RX ORDER — MULTIVITAMIN
1 TABLET ORAL DAILY
COMMUNITY

## 2025-06-18 NOTE — ASSESSMENT & PLAN NOTE
Bone health: DXA 5/25 T score OP. Has been on weekly oral Actonel for 5 years managed per Endo here at [facility]. + calcium/D supplements. No fractures. Menopause in 50s  Orders:    predniSONE 5 mg tablet; Take 2 tablets (10 mg total) by mouth daily    Sedimentation rate, automated; Future    C-reactive protein; Future    RHEUMATOID FACTOR; Future    Cyclic citrul peptide antibody, IgG; Future

## 2025-06-18 NOTE — PROGRESS NOTES
Name: Lydia Patel      : 1955      MRN: [id number]  Encounter Provider: Alan Parmar MD  Encounter Date: 2025   Encounter department: [facility] RHEUMATOLOGY [facility]  :  Assessment & Plan  PMR (polymyalgia rheumatica) ([facility])  Clinical s/s suggestive PMR  I have reviewed PMR and provided handouts  At this time no GCA symptoms  Check serologies and inflammatory markers  We discussed low dose prednisone 10 mg daily if ok with endocrinology  10 mg X 3 weeks, then taper by 1 mg every 3 weeks   Handouts for [place] provided   RTC 4 weeks   Orders:    predniSONE 5 mg tablet; Take 2 tablets (10 mg total) by mouth daily    Sedimentation rate, automated; Future    C-reactive protein; Future    RHEUMATOID FACTOR; Future    Cyclic citrul peptide antibody, IgG; Future    Age-related osteoporosis without current pathological fracture  Bone health: DXA  T score OP. Has been on weekly oral Actonel for 5 years managed per Endo here at [facility]. + calcium/D supplements. No fractures. Menopause in 50s  Orders:    predniSONE 5 mg tablet; Take 2 tablets (10 mg total) by mouth daily    Sedimentation rate, automated; Future    C-reactive protein; Future    RHEUMATOID FACTOR; Future    Cyclic citrul peptide antibody, IgG; Future    JOSE (latent autoimmune diabetes of adulthood) ([facility])    Lab Results   Component Value Date    HGBA1C 10.4 (H) 2025       Orders:    predniSONE 5 mg tablet; Take 2 tablets (10 mg total) by mouth daily    Sedimentation rate, automated; Future    C-reactive protein; Future    RHEUMATOID FACTOR; Future    Cyclic citrul peptide antibody, IgG; Future        This is a Rheumatology Consult seen at the request of Michelle MARTIN    History of Present Illness   HPI  Lydia Patel is a 69 y.o. female who presents for further evaluation arthralgias  History obtained from: patient      She has past medical history diabetes mellitus type 1 (JOSE), hypothyroidism, osteoporosis,  anxiety, HLD    Bone health: DXA  T score OP. Has been on weekly oral Actonel for 5 years managed per Endo here at [facility]. + calcium/D supplements. No fractures. Menopause in 50s    Onset of symptoms late 2025 with pain in left shoulder and then progressed to right shoulder. She has a hard time with shoulder elevation.     + pain in wrists and b/l hips    Was seen per Orthopedic and had b/l shoulder injections with mild relief (Ketorolac and not steroid due to JOSE type 1 DM)    No headaches, jaw pain, face/mouth/tongue    Denies double or blurry vision        Review of Systems  Pertinent Medical History           --------------------------------------------------------------------------------------------------------        ROS:        - for: Fevers, Chills or sweats.  No HAs or scalp tenderness.  No jaw claudication.  No acute visual or eye changes.  No dry eyes.  No auditory complaints.  No oral lesions or ulcers.  No dry mouth.  No sore throat or cough.  No chest pains or palpitations.  No shortness of breath, dyspnea on exertion or wheezing.  No hemotpysis.  No abdominal pain, GERD symptoms, diarrhea or constipation.  No urinary complaints.  No numbness, tingling or weakness.  No rashes.    All other ROS was reviewed and negative except as above         --------------------------------------------------------------------------------------------------------    Past Medical History    Past Medical History:  Diagnosis Date    Anxiety disorder     Diabetes mellitus (HCC)     Disease of thyroid gland     GERD (gastroesophageal reflux disease)     Hyperlipidemia     Ingrown toenail 1971    Osteoporosis            Past Surgical History    Past Surgical History:  Procedure Laterality Date     SECTION  1992    HEMORRHOID SURGERY  2013           Family History    Family History  Problem Relation Name Age of Onset    Cancer Mother Christina Bills         skin    Stroke Mother Christina Bills      Hypertension Mother Christina Bills     Hyperlipidemia Mother Christina Bills     Osteoporosis Mother Christina Bills     Thyroid disease unspecified Mother Christina Bills     Hypertension Father Akbar Bills     Cancer Father Akbar Bills     Lung cancer Father Akbar Bills         mesothelioma    Depression Sister      Thyroid disease Sister      Neurological problems Sister          essential tremor    Stomach cancer Maternal Grandmother      Heart attack Maternal Grandfather      Heart attack Paternal Grandmother      Pneumonia Paternal Grandfather      Completed Suicide  Paternal Uncle  80            Social History    Social History  Tobacco Use    Smoking status: Former     Current packs/day: 0.00     Average packs/day: 0.3 packs/day for 5.0 years (1.3 ttl pk-yrs)     Types: Cigarettes     Start date: 1970     Quit date: 1974     Years since quittin.0     Passive exposure: Past    Smokeless tobacco: Never   Vaping Use    Vaping status: Never Used   Substance Use Topics    Alcohol use: Yes     Alcohol/week: 1.0 standard drink of alcohol     Types: 1 Glasses of wine per week    Drug use: Never     Retired from [employer] company    Allergies    Allergies  Allergen Reactions    Bacitracin-Neomycin-Polymyxin Rash    Anatoliy-Bacit-Poly-Lidocaine Itching         Medications    Current Outpatient Medications   Medication Instructions    atorvastatin (LIPITOR) 20 mg tablet     Calcium Carb-Cholecalciferol 500-10 MG-MCG CHEW 1 tablet, Daily    celecoxib (CELEBREX) 100 mg, Oral, 2 times daily    Continuous Blood Gluc Sensor (FreeStyle Roxanna 3 Sensor) MISC     glucose blood test strip Use 4 times a day before meals and bedtime    insulin aspart (NovoLOG FlexPen) 100 UNIT/ML injection pen Inject 3 units with meals    Insulin Pen Needle 32G X 4 MM MISC Use to inject insulin nightly.    Levoxyl 112 mcg, Oral, Daily    metFORMIN (GLUCOPHAGE-XR) 500 mg 24 hr tablet     Multiple Vitamin (multivitamin) tablet 1 tablet,  "Daily    OneTouch Delica Lancets 33G MISC Use 4 times a day before meals ans bedtime    Risedronate Sodium 35 MG TBEC     Tresiba FlexTouch 8 Units, Subcutaneous, Daily at bedtime    Trulicity 1.5 MG/0.5ML SOAJ INJECT 1.5MG (0.5ML) UNDER THE SKIN ONCE A WEEK              ________________________________________________________________________      Results Review      Component      Latest Ref Rng 6/4/2025   Sodium      135 - 147 mmol/L 139    Potassium      3.5 - 5.3 mmol/L 4.8    Chloride      96 - 108 mmol/L 101    Carbon Dioxide      21 - 32 mmol/L 29    ANION GAP      4 - 13 mmol/L 9    BUN      5 - 25 mg/dL 16    Creatinine      0.60 - 1.30 mg/dL 0.54 (L)    GLUCOSE, FASTING      65 - 99 mg/dL 155 (H)    Calcium      8.4 - 10.2 mg/dL 9.5    AST      13 - 39 U/L 12 (L)    ALT      7 - 52 U/L 13    ALK PHOS      34 - 104 U/L 74    Total Protein      6.4 - 8.4 g/dL 6.5    Albumin      3.5 - 5.0 g/dL 4.2    Total Bilirubin      0.20 - 1.00 mg/dL 0.33    GFR, Calculated      ml/min/1.73sq m 95    Lyme total antibody      Negative  Negative       Legend:  (L) Low  (H) High                  Objective   /68   Pulse 83   Ht 5' 4\" (1.626 m)   Wt 55.8 kg (123 lb)   SpO2 97%   BMI 21.11 kg/m²      Physical Exam    GEN: AAO, No apparent distress.  Patient is well developed.  HEENT:  Pupils are equal, round and reactive.  Sclera are clear.  Fundoscopic exam is normal.  External ears are without lesions.  Oral pharynx is clear of ulcers or other lesions.  MMM.   NECK:  Supple.  There is no adenopathy appreciable in anterior or posterior cervical chains or supraclavicularly.  JVP is normal.    HEART: Regular rate and rhythm.  There is no appreciable murmur, gallop or rub.  LUNGS: Clear to auscultation.  ABD:  Soft, without tenderness, rebound or guarding.  No appreciable organomegally.  NEURO: Speech and cognition are normal.  Strength is 5/5 throughout.  Tone is normal.  DTRs are 2/4 at the knees, ankles and " elbows.  Gait is normal.  SKIN: There are no rashes or lesions    MUSCULOSKELETAL:   No synovitis noted    Thank you for involving me in this patient's care.        Alan Parmar MD  [facility] Rheumatology        I have spent a total time of 62 minutes in caring for this patient on the day of the visit/encounter including Diagnostic results, Prognosis, Risks and benefits of tx options, Risk factor reductions, Impressions, Counseling / Coordination of care, Documenting in the medical record, Reviewing/placing orders in the medical record (including tests, medications, and/or procedures), and Obtaining or reviewing history  .

## 2025-06-18 NOTE — TELEPHONE ENCOUNTER
Patient's Rheumatologist Dr. Alan Parmar wants to put her on a low dose of prednisone for a rhuem condition.    She said that the doctor told her he will send Dr Curtis a note to see if she is allowed to be on prednisone.    So she would like to ask Dr Curtis is she can go on a low dose or not?    Please advise. Patient would like a call back asap.

## 2025-06-18 NOTE — ASSESSMENT & PLAN NOTE
Lab Results   Component Value Date    HGBA1C 10.4 (H) 06/04/2025       Orders:    predniSONE 5 mg tablet; Take 2 tablets (10 mg total) by mouth daily    Sedimentation rate, automated; Future    C-reactive protein; Future    RHEUMATOID FACTOR; Future    Cyclic citrul peptide antibody, IgG; Future

## 2025-06-19 NOTE — TELEPHONE ENCOUNTER
If she needs it from a rheumatology perspective, we can make adjustments in her insulin regimen based on glucose trends.

## 2025-06-22 LAB — CCP AB SER IA-ACNC: 1.2 (ref ?–10)

## 2025-06-23 ENCOUNTER — TELEPHONE (OUTPATIENT)
Dept: DIABETES SERVICES | Facility: CLINIC | Age: 70
End: 2025-06-23

## 2025-06-24 ENCOUNTER — VBI (OUTPATIENT)
Dept: ADMINISTRATIVE | Facility: OTHER | Age: 70
End: 2025-06-24

## 2025-06-24 ENCOUNTER — APPOINTMENT (OUTPATIENT)
Dept: PHYSICAL THERAPY | Facility: CLINIC | Age: 70
End: 2025-06-24
Payer: COMMERCIAL

## 2025-06-24 NOTE — TELEPHONE ENCOUNTER
06/24/25 3:31 PM     Chart reviewed for CRC: Colonoscopy ; nothing is submitted to the patient's insurance at this time.     Joanne Storm [facility] VALUE BASED VIR

## 2025-06-26 ENCOUNTER — TELEPHONE (OUTPATIENT)
Age: 70
End: 2025-06-26

## 2025-06-26 NOTE — TELEPHONE ENCOUNTER
"Patient calling in asking to clarify if she was advised to stop her metformin and Trulicity during last OV. After review of chart from OV on 6/06/25-   \"Stop metformin and Trulicity.  In future, we will transition to flexible insulin therapy followed by insulin pump and sensor combination.\"    Patient verbalized understanding of all- voiced no further question/ concerns  "

## 2025-06-30 DIAGNOSIS — M25.50 ARTHRALGIA OF MULTIPLE JOINTS: ICD-10-CM

## 2025-07-02 RX ORDER — CELECOXIB 100 MG/1
100 CAPSULE ORAL 2 TIMES DAILY
Qty: 60 CAPSULE | Refills: 5 | Status: SHIPPED | OUTPATIENT
Start: 2025-07-02

## 2025-07-09 ENCOUNTER — TELEPHONE (OUTPATIENT)
Dept: DIABETES SERVICES | Facility: CLINIC | Age: 70
End: 2025-07-09

## 2025-07-09 NOTE — TELEPHONE ENCOUNTER
Lydia Ortega Dr. is ready for flexible insulin education. I have calculated the following for her flexible insulin regimen:     Target: 120 mg/dL  ISF: 50  ICR: 13    Please let me know if you would like to make any adjustments to this.

## 2025-07-09 NOTE — TELEPHONE ENCOUNTER
----- Message from Yenny NINA sent at 7/9/2025  1:38 PM EDT -----  Regarding: Flexible Insulin Appointment Needed  Can Lydia be scheduled for flexible insulin education please?    Thanks in advance!

## 2025-07-16 ENCOUNTER — TELEPHONE (OUTPATIENT)
Dept: DIABETES SERVICES | Facility: CLINIC | Age: 70
End: 2025-07-16

## 2025-07-17 DIAGNOSIS — E78.2 MIXED HYPERLIPIDEMIA: Primary | ICD-10-CM

## 2025-07-17 RX ORDER — ATORVASTATIN CALCIUM 20 MG/1
20 TABLET, FILM COATED ORAL DAILY
Qty: 90 TABLET | Refills: 3 | Status: SHIPPED | OUTPATIENT
Start: 2025-07-17

## 2025-07-17 NOTE — TELEPHONE ENCOUNTER
Message sent via [facility].   Refill of Atorvasatin 20 mg tablet   This medicine was originally prescribed by my Dr in [place]. I need a refill. Can you call it into [facility] on [address]?   Thank you   Lydia Patel

## 2025-07-23 ENCOUNTER — TELEPHONE (OUTPATIENT)
Dept: DIABETES SERVICES | Facility: CLINIC | Age: 70
End: 2025-07-23

## 2025-07-23 ENCOUNTER — OFFICE VISIT (OUTPATIENT)
Dept: DIABETES SERVICES | Facility: CLINIC | Age: 70
End: 2025-07-23
Payer: COMMERCIAL

## 2025-07-23 DIAGNOSIS — E13.9 LADA (LATENT AUTOIMMUNE DIABETES OF ADULTHOOD) (HCC): Primary | ICD-10-CM

## 2025-07-23 PROCEDURE — G0108 DIAB MANAGE TRN  PER INDIV: HCPCS

## 2025-07-23 NOTE — PATIENT INSTRUCTIONS
Please return 3-day food and insulin log in ~1 week. May either drop off log or send pictures of completed log to [email or web address].

## 2025-07-23 NOTE — PROGRESS NOTES
Flexible Insulin Instruction     Met with Lydia Patel for flexible insulin teaching. Lydia is currently taking the following diabetes medications: 10 units of tresiba daily and 3 units of novolog with meals + scale. Lydia is currently using Roxanna CGM for blood sugar monitoring. She reports of an increase in her blood sugar since starting on steroids. Downloaded Roxanna report. Patient is 97% very high, 2% high, 1% in target range and 0% low/very low. Sent report to Dr. Curtis. Report was also placed into media tab. Informed patient if blood sugar is >400 mg/dL to go to ER. Discussed risk of DKA and symptoms to watch out for. Once starting flexible insulin, Lydia will use the following ratios to calculate insulin requirements: target 120 mg/dL, insulin to carb ratio: 13, and correction factor: 50.     Comments: Patient was taught how to do flexible insulin using determined insulin carb ratio, insulin sensitivity factor, and preprandial blood glucose target. Patient was able to correctly calculate flexible insulin doses using determined flexible regimen when provided with sample carbohydrate amounts and blood glucose readings. Lydia will keep a 3-day food record that includes calculated carbohydrates of foods at each meal, preprandial blood glucose readings, and calculated flexible insulin dose administered.     Patient instructed on: Insulin types; onset, peak, and duration of both his presecribed bolus and basal insulin  Comments: Lydia has good understanding of flexible insulin regimen and was instructed start new regimen.    Diabetes Education Record:  Lydia was given the following education material: Flexible Insulin Workbook.     Lab Results   Component Value Date    HGBA1C 10.4 (H) 06/04/2025     Of note, patient reports that she has decided on the Omnipod 5 insulin pump. She is unsure if insurance will cover. Sent message to Dr. Curtis to place orders. Also, emailed Onur Floyd from [employer] to pass  along patient's interest in Omnipod 5.     Patient response to instruction    Comprehension: good  Motivation: good  Expected Compliance: good    Thank you for referring your patient to [facility], it was a pleasure working with them today. Please feel free to call with any questions or concerns.    Start- Stop: 8:40-9:40  Total Minutes: 60 Minutes  Group or Individual Instruction: DSME-I  Other: MD Yenny Hollins, MS, RD, LDN  [id number]  [facility] [address]  [place] [id number]

## 2025-07-23 NOTE — TELEPHONE ENCOUNTER
Hi Dr. Curtis,    I met with Lydia today for flexible insulin education. During our visit today, Lydia reports her blood sugars have been very high since starting on steroids. I did download her Roxanna today and scanned it into the media tab for your review. She is very high >250 mg/dL 97% of the time per her download today.     Also, she told me that she has decided on the Omnipod 5 insulin pump. Would you be able to place orders for this please?    Thanks in advance!

## 2025-07-30 ENCOUNTER — APPOINTMENT (OUTPATIENT)
Dept: LAB | Facility: CLINIC | Age: 70
End: 2025-07-30
Payer: COMMERCIAL

## 2025-07-30 ENCOUNTER — OFFICE VISIT (OUTPATIENT)
Dept: RHEUMATOLOGY | Facility: CLINIC | Age: 70
End: 2025-07-30
Payer: COMMERCIAL

## 2025-07-30 VITALS
BODY MASS INDEX: 22.57 KG/M2 | SYSTOLIC BLOOD PRESSURE: 112 MMHG | TEMPERATURE: 98.1 F | WEIGHT: 132.2 LBS | HEART RATE: 67 BPM | OXYGEN SATURATION: 97 % | HEIGHT: 64 IN | DIASTOLIC BLOOD PRESSURE: 64 MMHG

## 2025-07-30 DIAGNOSIS — E13.9 LADA (LATENT AUTOIMMUNE DIABETES OF ADULTHOOD) (HCC): ICD-10-CM

## 2025-07-30 DIAGNOSIS — M81.0 AGE-RELATED OSTEOPOROSIS WITHOUT CURRENT PATHOLOGICAL FRACTURE: ICD-10-CM

## 2025-07-30 DIAGNOSIS — M35.3 PMR (POLYMYALGIA RHEUMATICA) (HCC): Primary | ICD-10-CM

## 2025-07-30 DIAGNOSIS — Z11.59 ENCOUNTER FOR SCREENING FOR OTHER VIRAL DISEASES: ICD-10-CM

## 2025-07-30 DIAGNOSIS — M35.3 PMR (POLYMYALGIA RHEUMATICA) (HCC): ICD-10-CM

## 2025-07-30 PROCEDURE — 86803 HEPATITIS C AB TEST: CPT

## 2025-07-30 PROCEDURE — 36415 COLL VENOUS BLD VENIPUNCTURE: CPT

## 2025-07-30 PROCEDURE — G2211 COMPLEX E/M VISIT ADD ON: HCPCS | Performed by: INTERNAL MEDICINE

## 2025-07-30 PROCEDURE — 86480 TB TEST CELL IMMUN MEASURE: CPT

## 2025-07-30 PROCEDURE — 86704 HEP B CORE ANTIBODY TOTAL: CPT

## 2025-07-30 PROCEDURE — 87340 HEPATITIS B SURFACE AG IA: CPT

## 2025-07-30 PROCEDURE — 86705 HEP B CORE ANTIBODY IGM: CPT

## 2025-07-30 PROCEDURE — 99215 OFFICE O/P EST HI 40 MIN: CPT | Performed by: INTERNAL MEDICINE

## 2025-07-31 ENCOUNTER — TELEPHONE (OUTPATIENT)
Dept: RHEUMATOLOGY | Facility: CLINIC | Age: 70
End: 2025-07-31

## 2025-07-31 ENCOUNTER — TELEPHONE (OUTPATIENT)
Dept: DIABETES SERVICES | Facility: CLINIC | Age: 70
End: 2025-07-31

## 2025-07-31 DIAGNOSIS — M35.3 PMR (POLYMYALGIA RHEUMATICA) (HCC): Primary | ICD-10-CM

## 2025-07-31 LAB
GAMMA INTERFERON BACKGROUND BLD IA-ACNC: 0.01 IU/ML
HBV CORE AB SER QL: NORMAL
HBV CORE IGM SER QL: NORMAL
HBV SURFACE AG SER QL: NORMAL
HCV AB SER QL: NORMAL
M TB IFN-G BLD-IMP: NEGATIVE
M TB IFN-G CD4+ BCKGRND COR BLD-ACNC: 0 IU/ML
M TB IFN-G CD4+ BCKGRND COR BLD-ACNC: 0 IU/ML
MITOGEN IGNF BCKGRD COR BLD-ACNC: 4.82 IU/ML

## 2025-07-31 RX ORDER — SARILUMAB 150 MG/1.14ML
INJECTION, SOLUTION SUBCUTANEOUS
Qty: 2 ML | Refills: 6 | Status: SHIPPED | OUTPATIENT
Start: 2025-07-31 | End: 2025-08-01

## 2025-08-01 RX ORDER — SARILUMAB 200 MG/1.14ML
INJECTION, SOLUTION SUBCUTANEOUS
Qty: 2 ML | Refills: 6 | Status: SHIPPED | OUTPATIENT
Start: 2025-08-01

## 2025-08-03 ENCOUNTER — TREATMENT (OUTPATIENT)
Dept: ENDOCRINOLOGY | Facility: CLINIC | Age: 70
End: 2025-08-03
Payer: COMMERCIAL

## 2025-08-03 DIAGNOSIS — E13.9 LADA (LATENT AUTOIMMUNE DIABETES OF ADULTHOOD) (HCC): Primary | ICD-10-CM

## 2025-08-03 PROCEDURE — 95251 CONT GLUC MNTR ANALYSIS I&R: CPT | Performed by: INTERNAL MEDICINE

## 2025-08-03 RX ORDER — INSULIN LISPRO 100 [IU]/ML
INJECTION, SOLUTION INTRAVENOUS; SUBCUTANEOUS
Qty: 30 ML | Refills: 3 | Status: SHIPPED | OUTPATIENT
Start: 2025-08-03 | End: 2025-08-12

## 2025-08-03 RX ORDER — INSULIN PMP CART,AUT,G6/7,CNTR
1 EACH SUBCUTANEOUS CONTINUOUS
Qty: 1 KIT | Refills: 0 | Status: SHIPPED | OUTPATIENT
Start: 2025-08-03 | End: 2025-08-12

## 2025-08-03 RX ORDER — INSULIN PMP CART,AUT,G6/7,CNTR
1 EACH SUBCUTANEOUS
Qty: 10 EACH | Refills: 5 | Status: SHIPPED | OUTPATIENT
Start: 2025-08-03 | End: 2025-08-12

## 2025-08-04 DIAGNOSIS — E13.9 LADA (LATENT AUTOIMMUNE DIABETES OF ADULTHOOD) (HCC): ICD-10-CM

## 2025-08-05 ENCOUNTER — TELEPHONE (OUTPATIENT)
Dept: ENDOCRINOLOGY | Facility: CLINIC | Age: 70
End: 2025-08-05

## 2025-08-05 ENCOUNTER — PATIENT MESSAGE (OUTPATIENT)
Dept: RHEUMATOLOGY | Facility: CLINIC | Age: 70
End: 2025-08-05

## 2025-08-05 DIAGNOSIS — E13.9 LADA (LATENT AUTOIMMUNE DIABETES OF ADULTHOOD) (HCC): ICD-10-CM

## 2025-08-05 RX ORDER — INSULIN DEGLUDEC 100 U/ML
15 INJECTION, SOLUTION SUBCUTANEOUS
Qty: 15 ML | Refills: 0 | Status: SHIPPED | OUTPATIENT
Start: 2025-08-05 | End: 2025-08-12 | Stop reason: SDUPTHER

## 2025-08-05 RX ORDER — INSULIN PMP CART,AUT,G6/7,CNTR
EACH SUBCUTANEOUS
Refills: 0 | OUTPATIENT
Start: 2025-08-05

## 2025-08-05 RX ORDER — INSULIN ASPART 100 [IU]/ML
INJECTION, SOLUTION INTRAVENOUS; SUBCUTANEOUS
Qty: 30 ML | Refills: 1 | Status: SHIPPED | OUTPATIENT
Start: 2025-08-05 | End: 2025-08-12 | Stop reason: SDUPTHER

## 2025-08-05 RX ORDER — INSULIN PMP CART,AUT,G6/7,CNTR
EACH SUBCUTANEOUS
Qty: 10 EACH | Refills: 5 | OUTPATIENT
Start: 2025-08-05

## 2025-08-06 RX ORDER — INSULIN PMP CART,AUT,G6/7,CNTR
EACH SUBCUTANEOUS
Qty: 10 EACH | Refills: 5 | OUTPATIENT
Start: 2025-08-06

## 2025-08-06 RX ORDER — INSULIN PMP CART,AUT,G6/7,CNTR
EACH SUBCUTANEOUS
Refills: 0 | OUTPATIENT
Start: 2025-08-06

## 2025-08-11 ENCOUNTER — APPOINTMENT (OUTPATIENT)
Dept: LAB | Facility: CLINIC | Age: 70
End: 2025-08-11
Payer: COMMERCIAL

## 2025-08-12 ENCOUNTER — OFFICE VISIT (OUTPATIENT)
Dept: FAMILY MEDICINE CLINIC | Facility: CLINIC | Age: 70
End: 2025-08-12
Payer: COMMERCIAL

## 2025-08-12 ENCOUNTER — TELEPHONE (OUTPATIENT)
Age: 70
End: 2025-08-12

## 2025-08-14 DIAGNOSIS — M81.0 AGE-RELATED OSTEOPOROSIS WITHOUT CURRENT PATHOLOGICAL FRACTURE: Primary | ICD-10-CM

## 2025-08-19 ENCOUNTER — TELEPHONE (OUTPATIENT)
Dept: RHEUMATOLOGY | Facility: CLINIC | Age: 70
End: 2025-08-19

## 2025-08-19 RX ORDER — RISEDRONATE SODIUM 35 MG/1
35 TABLET, DELAYED RELEASE ORAL WEEKLY
Qty: 12 TABLET | Refills: 0 | Status: SHIPPED | OUTPATIENT
Start: 2025-08-19

## 2025-08-21 ENCOUNTER — TELEPHONE (OUTPATIENT)
Age: 70
End: 2025-08-21